# Patient Record
Sex: FEMALE | Race: BLACK OR AFRICAN AMERICAN | Employment: OTHER | URBAN - METROPOLITAN AREA
[De-identification: names, ages, dates, MRNs, and addresses within clinical notes are randomized per-mention and may not be internally consistent; named-entity substitution may affect disease eponyms.]

---

## 2021-12-20 DIAGNOSIS — Z11.59 ENCOUNTER FOR SCREENING FOR OTHER VIRAL DISEASES: ICD-10-CM

## 2021-12-23 ENCOUNTER — TRANSFERRED RECORDS (OUTPATIENT)
Dept: MULTI SPECIALTY CLINIC | Facility: CLINIC | Age: 66
End: 2021-12-23
Payer: MEDICARE

## 2021-12-23 LAB
ALBUMIN (URINE) MG/SPEC: 123.1 UG/ML
ALBUMIN/CREATININE RATIO: 485 MG/G CREAT (ref 0–29)
CREATININE (EXTERNAL): 1.74 MG/DL (ref 0.57–1)
CREATININE (URINE): 25.4 MG/DL
GFR ESTIMATED (EXTERNAL): 30 ML/MIN/1.73
GFR ESTIMATED (IF AFRICAN AMERICAN) (EXTERNAL): 34 ML/MIN/1.73
GLUCOSE (EXTERNAL): 153 MG/DL (ref 65–99)
HBA1C MFR BLD: 6.9 %
POTASSIUM (EXTERNAL): 4.2 MMOL/L (ref 3.5–5.2)

## 2022-01-13 RX ORDER — CHOLECALCIFEROL (VITAMIN D3) 50 MCG
1 TABLET ORAL DAILY
COMMUNITY

## 2022-01-13 RX ORDER — CARVEDILOL 25 MG/1
25 TABLET ORAL 2 TIMES DAILY WITH MEALS
COMMUNITY

## 2022-01-13 RX ORDER — LOSARTAN POTASSIUM 100 MG/1
100 TABLET ORAL DAILY
COMMUNITY

## 2022-01-13 RX ORDER — FUROSEMIDE 40 MG
40 TABLET ORAL DAILY
COMMUNITY

## 2022-01-13 RX ORDER — INSULIN GLARGINE 100 [IU]/ML
10 INJECTION, SOLUTION SUBCUTANEOUS AT BEDTIME
COMMUNITY

## 2022-01-13 RX ORDER — ATORVASTATIN CALCIUM 10 MG/1
10 TABLET, FILM COATED ORAL DAILY
COMMUNITY

## 2022-01-13 RX ORDER — INSULIN ASPART 100 [IU]/ML
1-15 INJECTION, SOLUTION INTRAVENOUS; SUBCUTANEOUS
COMMUNITY

## 2022-01-13 RX ORDER — ASPIRIN 81 MG/1
81 TABLET ORAL DAILY
Status: ON HOLD | COMMUNITY
End: 2022-01-18

## 2022-01-13 RX ORDER — AMLODIPINE BESYLATE 10 MG/1
10 TABLET ORAL DAILY
COMMUNITY

## 2022-01-13 NOTE — PROGRESS NOTES
Total Joint Patient Screening    1. Do you have a ride available to come to the hospital the day after your surgery by 8am with anticipated discharge of 11am? Yes  2. What is the name of this person? Lionel (spouse)  3. Do you have a  set up after surgery? Yes  4. Will your  be the same person that gives you a ride home after surgery? Yes  5. Have you received the Joint Replacement Guidebook? Yes  6. Do you have any questions about your guidebook? No  7. Have you activated you [facility] account? No  8. Have you signed up for MY Chart access? No

## 2022-01-14 NOTE — PROGRESS NOTES
PTA medications updated by Medication Scribe prior to surgery via phone call with patient (last doses completed by Nurse)     Medication history sources: Patient and H&P  In the past week, patient estimated taking medication this percent of the time: Greater than 90%  Adherence assessment: N/A Not Observed    Significant changes made to the medication list:  None      Additional medication history information:   None    Medication reconciliation completed by provider prior to medication history? No    Time spent in this activity: 35 MINUTES    The information provided in this note is only as accurate as the sources available at the time of update(s)      Prior to Admission medications    Medication Sig Last Dose Taking? Auth Provider   amLODIPine (NORVASC) 10 MG tablet Take 10 mg by mouth daily  at AM Yes Reported, Patient   aspirin 81 MG EC tablet Take 81 mg by mouth daily  at AM Yes Reported, Patient   atorvastatin (LIPITOR) 10 MG tablet Take 10 mg by mouth daily  at AM Yes Reported, Patient   carvedilol (COREG) 25 MG tablet Take 25 mg by mouth 2 times daily (with meals)  at PM Yes Reported, Patient   furosemide (LASIX) 40 MG tablet Take 40 mg by mouth daily  at AM Yes Reported, Patient   insulin aspart (NOVOLOG FLEXPEN) 100 UNIT/ML pen Inject 1-15 Units Subcutaneous 3 times daily (with meals) Inject 1-15 units under the skin three times a day with meals.   Take 1 unit per 13 gram carb before breakfast.  Take 1 unit per 20 gram carb before lunch  Take 1 unit per 7 gram carb before dinner  at PM Yes Reported, Patient   insulin glargine (BASAGLAR KWIKPEN) 100 UNIT/ML pen Inject 10 Units Subcutaneous At Bedtime  at PM Yes Reported, Patient   losartan (COZAAR) 100 MG tablet Take 100 mg by mouth daily  at AM Yes Reported, Patient   vitamin D3 (VITAMIN D3) 50 mcg (2000 units) tablet Take 1 tablet by mouth daily   at AM Yes Reported, Patient

## 2022-01-15 ENCOUNTER — LAB (OUTPATIENT)
Dept: URGENT CARE | Facility: URGENT CARE | Age: 67
End: 2022-01-15
Attending: ORTHOPAEDIC SURGERY
Payer: MEDICARE

## 2022-01-15 DIAGNOSIS — Z11.59 ENCOUNTER FOR SCREENING FOR OTHER VIRAL DISEASES: ICD-10-CM

## 2022-01-15 LAB — SARS-COV-2 RNA RESP QL NAA+PROBE: NEGATIVE

## 2022-01-15 PROCEDURE — U0003 INFECTIOUS AGENT DETECTION BY NUCLEIC ACID (DNA OR RNA); SEVERE ACUTE RESPIRATORY SYNDROME CORONAVIRUS 2 (SARS-COV-2) (CORONAVIRUS DISEASE [COVID-19]), AMPLIFIED PROBE TECHNIQUE, MAKING USE OF HIGH THROUGHPUT TECHNOLOGIES AS DESCRIBED BY CMS-2020-01-R: HCPCS

## 2022-01-15 PROCEDURE — U0005 INFEC AGEN DETEC AMPLI PROBE: HCPCS

## 2022-01-16 ENCOUNTER — ANESTHESIA EVENT (OUTPATIENT)
Dept: SURGERY | Facility: CLINIC | Age: 67
End: 2022-01-16
Payer: MEDICARE

## 2022-01-16 ASSESSMENT — LIFESTYLE VARIABLES: TOBACCO_USE: 0

## 2022-01-16 ASSESSMENT — ENCOUNTER SYMPTOMS: DYSRHYTHMIAS: 0

## 2022-01-16 ASSESSMENT — COPD QUESTIONNAIRES: COPD: 0

## 2022-01-17 ENCOUNTER — ANESTHESIA (OUTPATIENT)
Dept: SURGERY | Facility: CLINIC | Age: 67
End: 2022-01-17
Payer: MEDICARE

## 2022-01-17 ENCOUNTER — APPOINTMENT (OUTPATIENT)
Dept: PHYSICAL THERAPY | Facility: CLINIC | Age: 67
End: 2022-01-17
Attending: ORTHOPAEDIC SURGERY
Payer: MEDICARE

## 2022-01-17 ENCOUNTER — HOSPITAL ENCOUNTER (OUTPATIENT)
Facility: CLINIC | Age: 67
Discharge: HOME | End: 2022-01-18
Attending: ORTHOPAEDIC SURGERY | Admitting: ORTHOPAEDIC SURGERY
Payer: MEDICARE

## 2022-01-17 ENCOUNTER — APPOINTMENT (OUTPATIENT)
Dept: GENERAL RADIOLOGY | Facility: CLINIC | Age: 67
End: 2022-01-17
Attending: ORTHOPAEDIC SURGERY
Payer: MEDICARE

## 2022-01-17 DIAGNOSIS — E11.69 TYPE 2 DIABETES MELLITUS WITH OTHER SPECIFIED COMPLICATION, WITH LONG-TERM CURRENT USE OF INSULIN (H): ICD-10-CM

## 2022-01-17 DIAGNOSIS — Z96.651 STATUS POST TOTAL RIGHT KNEE REPLACEMENT: Primary | ICD-10-CM

## 2022-01-17 DIAGNOSIS — Z79.4 TYPE 2 DIABETES MELLITUS WITH OTHER SPECIFIED COMPLICATION, WITH LONG-TERM CURRENT USE OF INSULIN (H): ICD-10-CM

## 2022-01-17 DIAGNOSIS — M17.11 PRIMARY OSTEOARTHRITIS OF RIGHT KNEE: ICD-10-CM

## 2022-01-17 DIAGNOSIS — K59.03 DRUG-INDUCED CONSTIPATION: ICD-10-CM

## 2022-01-17 PROBLEM — N18.30 CRF (CHRONIC RENAL FAILURE), STAGE 3 (MODERATE) (H): Status: ACTIVE | Noted: 2022-01-17

## 2022-01-17 PROBLEM — E11.9 TYPE 2 DIABETES MELLITUS (H): Status: ACTIVE | Noted: 2022-01-17

## 2022-01-17 LAB
ANION GAP SERPL CALCULATED.3IONS-SCNC: 6 MMOL/L (ref 3–14)
BUN SERPL-MCNC: 38 MG/DL (ref 7–30)
CALCIUM SERPL-MCNC: 9 MG/DL (ref 8.5–10.1)
CHLORIDE BLD-SCNC: 108 MMOL/L (ref 94–109)
CO2 SERPL-SCNC: 27 MMOL/L (ref 20–32)
CREAT SERPL-MCNC: 1.56 MG/DL (ref 0.52–1.04)
CREAT SERPL-MCNC: 1.89 MG/DL (ref 0.52–1.04)
FASTING STATUS PATIENT QL REPORTED: YES
GFR SERPL CREATININE-BSD FRML MDRD: 29 ML/MIN/1.73M2
GFR SERPL CREATININE-BSD FRML MDRD: 36 ML/MIN/1.73M2
GLUCOSE BLD-MCNC: 103 MG/DL (ref 70–99)
GLUCOSE BLD-MCNC: 291 MG/DL (ref 70–99)
GLUCOSE BLDC GLUCOMTR-MCNC: 201 MG/DL (ref 70–99)
GLUCOSE BLDC GLUCOMTR-MCNC: 206 MG/DL (ref 70–99)
GLUCOSE BLDC GLUCOMTR-MCNC: 244 MG/DL (ref 70–99)
GLUCOSE BLDC GLUCOMTR-MCNC: 278 MG/DL (ref 70–99)
GLUCOSE BLDC GLUCOMTR-MCNC: 97 MG/DL (ref 70–99)
LACTATE SERPL-SCNC: 1.4 MMOL/L (ref 0.7–2)
POTASSIUM BLD-SCNC: 3.7 MMOL/L (ref 3.4–5.3)
POTASSIUM BLD-SCNC: 4 MMOL/L (ref 3.4–5.3)
SODIUM SERPL-SCNC: 141 MMOL/L (ref 133–144)

## 2022-01-17 PROCEDURE — 97530 THERAPEUTIC ACTIVITIES: CPT | Mod: GP | Performed by: PHYSICAL THERAPIST

## 2022-01-17 PROCEDURE — 96372 THER/PROPH/DIAG INJ SC/IM: CPT | Performed by: PHYSICIAN ASSISTANT

## 2022-01-17 PROCEDURE — 250N000011 HC RX IP 250 OP 636: Performed by: ORTHOPAEDIC SURGERY

## 2022-01-17 PROCEDURE — 258N000003 HC RX IP 258 OP 636: Performed by: ORTHOPAEDIC SURGERY

## 2022-01-17 PROCEDURE — 83605 ASSAY OF LACTIC ACID: CPT | Performed by: ORTHOPAEDIC SURGERY

## 2022-01-17 PROCEDURE — 82947 ASSAY GLUCOSE BLOOD QUANT: CPT | Performed by: ANESTHESIOLOGY

## 2022-01-17 PROCEDURE — 36415 COLL VENOUS BLD VENIPUNCTURE: CPT | Performed by: ORTHOPAEDIC SURGERY

## 2022-01-17 PROCEDURE — 82565 ASSAY OF CREATININE: CPT | Performed by: ORTHOPAEDIC SURGERY

## 2022-01-17 PROCEDURE — 250N000009 HC RX 250: Performed by: ANESTHESIOLOGY

## 2022-01-17 PROCEDURE — 99218 PR INITIAL OBSERVATION CARE,LEVEL I: CPT | Performed by: PHYSICIAN ASSISTANT

## 2022-01-17 PROCEDURE — 84132 ASSAY OF SERUM POTASSIUM: CPT | Performed by: ANESTHESIOLOGY

## 2022-01-17 PROCEDURE — 999N000063 XR KNEE PORT RIGHT 1/2 VIEWS: Mod: RT

## 2022-01-17 PROCEDURE — 97116 GAIT TRAINING THERAPY: CPT | Mod: GP | Performed by: PHYSICAL THERAPIST

## 2022-01-17 PROCEDURE — 272N000001 HC OR GENERAL SUPPLY STERILE: Performed by: ORTHOPAEDIC SURGERY

## 2022-01-17 PROCEDURE — 258N000003 HC RX IP 258 OP 636: Performed by: ANESTHESIOLOGY

## 2022-01-17 PROCEDURE — 36415 COLL VENOUS BLD VENIPUNCTURE: CPT | Performed by: ANESTHESIOLOGY

## 2022-01-17 PROCEDURE — 250N000012 HC RX MED GY IP 250 OP 636 PS 637: Performed by: ANESTHESIOLOGY

## 2022-01-17 PROCEDURE — 250N000011 HC RX IP 250 OP 636: Performed by: ANESTHESIOLOGY

## 2022-01-17 PROCEDURE — 36415 COLL VENOUS BLD VENIPUNCTURE: CPT | Performed by: PHYSICIAN ASSISTANT

## 2022-01-17 PROCEDURE — 370N000017 HC ANESTHESIA TECHNICAL FEE, PER MIN: Performed by: ORTHOPAEDIC SURGERY

## 2022-01-17 PROCEDURE — 278N000051 HC OR IMPLANT GENERAL: Performed by: ORTHOPAEDIC SURGERY

## 2022-01-17 PROCEDURE — 360N000077 HC SURGERY LEVEL 4, PER MIN: Performed by: ORTHOPAEDIC SURGERY

## 2022-01-17 PROCEDURE — 710N000009 HC RECOVERY PHASE 1, LEVEL 1, PER MIN: Performed by: ORTHOPAEDIC SURGERY

## 2022-01-17 PROCEDURE — 250N000013 HC RX MED GY IP 250 OP 250 PS 637: Performed by: PHYSICIAN ASSISTANT

## 2022-01-17 PROCEDURE — 250N000012 HC RX MED GY IP 250 OP 636 PS 637: Performed by: PHYSICIAN ASSISTANT

## 2022-01-17 PROCEDURE — 97161 PT EVAL LOW COMPLEX 20 MIN: CPT | Mod: GP | Performed by: PHYSICAL THERAPIST

## 2022-01-17 PROCEDURE — 999N000141 HC STATISTIC PRE-PROCEDURE NURSING ASSESSMENT: Performed by: ORTHOPAEDIC SURGERY

## 2022-01-17 PROCEDURE — C1776 JOINT DEVICE (IMPLANTABLE): HCPCS | Performed by: ORTHOPAEDIC SURGERY

## 2022-01-17 PROCEDURE — 99221 1ST HOSP IP/OBS SF/LOW 40: CPT | Performed by: INTERNAL MEDICINE

## 2022-01-17 PROCEDURE — 82947 ASSAY GLUCOSE BLOOD QUANT: CPT | Performed by: PHYSICIAN ASSISTANT

## 2022-01-17 PROCEDURE — 271N000001 HC OR GENERAL SUPPLY NON-STERILE: Performed by: ORTHOPAEDIC SURGERY

## 2022-01-17 PROCEDURE — 250N000013 HC RX MED GY IP 250 OP 250 PS 637: Performed by: ORTHOPAEDIC SURGERY

## 2022-01-17 PROCEDURE — 82962 GLUCOSE BLOOD TEST: CPT

## 2022-01-17 DEVICE — BONE CEMENT SIMPLEX FULL DOSE 6191-1-001: Type: IMPLANTABLE DEVICE | Site: KNEE | Status: FUNCTIONAL

## 2022-01-17 DEVICE — IMPLANTABLE DEVICE: Type: IMPLANTABLE DEVICE | Site: KNEE | Status: FUNCTIONAL

## 2022-01-17 DEVICE — IMP PATELLA ZIM KNEE ALL POLLY 32MM 42-5400-000-32: Type: IMPLANTABLE DEVICE | Site: KNEE | Status: FUNCTIONAL

## 2022-01-17 DEVICE — IMP STEM EXT ZIM PERSONA KNEE TPR 14MM + 30MM 42-5570-001-14: Type: IMPLANTABLE DEVICE | Site: KNEE | Status: FUNCTIONAL

## 2022-01-17 RX ORDER — TRANEXAMIC ACID 650 MG/1
1950 TABLET ORAL ONCE
Status: COMPLETED | OUTPATIENT
Start: 2022-01-17 | End: 2022-01-17

## 2022-01-17 RX ORDER — HYDROXYZINE HYDROCHLORIDE 10 MG/1
10 TABLET, FILM COATED ORAL EVERY 6 HOURS PRN
Qty: 30 TABLET | Refills: 0 | Status: SHIPPED | OUTPATIENT
Start: 2022-01-17

## 2022-01-17 RX ORDER — SODIUM CHLORIDE, SODIUM LACTATE, POTASSIUM CHLORIDE, CALCIUM CHLORIDE 600; 310; 30; 20 MG/100ML; MG/100ML; MG/100ML; MG/100ML
INJECTION, SOLUTION INTRAVENOUS CONTINUOUS
Status: DISCONTINUED | OUTPATIENT
Start: 2022-01-17 | End: 2022-01-17 | Stop reason: HOSPADM

## 2022-01-17 RX ORDER — POLYETHYLENE GLYCOL 3350 17 G/17G
17 POWDER, FOR SOLUTION ORAL DAILY
Status: DISCONTINUED | OUTPATIENT
Start: 2022-01-18 | End: 2022-01-18 | Stop reason: HOSPADM

## 2022-01-17 RX ORDER — BUPIVACAINE HYDROCHLORIDE 7.5 MG/ML
INJECTION, SOLUTION INTRASPINAL PRN
Status: DISCONTINUED | OUTPATIENT
Start: 2022-01-17 | End: 2022-01-17

## 2022-01-17 RX ORDER — HYDROMORPHONE HYDROCHLORIDE 2 MG/1
2 TABLET ORAL EVERY 4 HOURS PRN
Status: DISCONTINUED | OUTPATIENT
Start: 2022-01-17 | End: 2022-01-18 | Stop reason: HOSPADM

## 2022-01-17 RX ORDER — AMOXICILLIN 250 MG
1-2 CAPSULE ORAL 2 TIMES DAILY
Qty: 30 TABLET | Refills: 0 | Status: SHIPPED | OUTPATIENT
Start: 2022-01-17

## 2022-01-17 RX ORDER — HYDROMORPHONE HCL IN WATER/PF 6 MG/30 ML
0.4 PATIENT CONTROLLED ANALGESIA SYRINGE INTRAVENOUS
Status: DISCONTINUED | OUTPATIENT
Start: 2022-01-17 | End: 2022-01-18 | Stop reason: HOSPADM

## 2022-01-17 RX ORDER — SODIUM CHLORIDE 9 MG/ML
INJECTION, SOLUTION INTRAVENOUS CONTINUOUS
Status: DISCONTINUED | OUTPATIENT
Start: 2022-01-17 | End: 2022-01-18 | Stop reason: HOSPADM

## 2022-01-17 RX ORDER — ONDANSETRON 4 MG/1
4 TABLET, ORALLY DISINTEGRATING ORAL EVERY 30 MIN PRN
Status: DISCONTINUED | OUTPATIENT
Start: 2022-01-17 | End: 2022-01-17 | Stop reason: HOSPADM

## 2022-01-17 RX ORDER — NICOTINE POLACRILEX 4 MG
15-30 LOZENGE BUCCAL
Status: DISCONTINUED | OUTPATIENT
Start: 2022-01-17 | End: 2022-01-18 | Stop reason: HOSPADM

## 2022-01-17 RX ORDER — ONDANSETRON 2 MG/ML
INJECTION INTRAMUSCULAR; INTRAVENOUS PRN
Status: DISCONTINUED | OUTPATIENT
Start: 2022-01-17 | End: 2022-01-17

## 2022-01-17 RX ORDER — ONDANSETRON 2 MG/ML
4 INJECTION INTRAMUSCULAR; INTRAVENOUS EVERY 30 MIN PRN
Status: DISCONTINUED | OUTPATIENT
Start: 2022-01-17 | End: 2022-01-17 | Stop reason: HOSPADM

## 2022-01-17 RX ORDER — DEXTROSE MONOHYDRATE 25 G/50ML
25-50 INJECTION, SOLUTION INTRAVENOUS
Status: DISCONTINUED | OUTPATIENT
Start: 2022-01-17 | End: 2022-01-18 | Stop reason: HOSPADM

## 2022-01-17 RX ORDER — AMLODIPINE BESYLATE 10 MG/1
10 TABLET ORAL DAILY
Status: DISCONTINUED | OUTPATIENT
Start: 2022-01-18 | End: 2022-01-18 | Stop reason: HOSPADM

## 2022-01-17 RX ORDER — PROPOFOL 10 MG/ML
INJECTION, EMULSION INTRAVENOUS CONTINUOUS PRN
Status: DISCONTINUED | OUTPATIENT
Start: 2022-01-17 | End: 2022-01-17

## 2022-01-17 RX ORDER — CEFAZOLIN SODIUM 2 G/100ML
2 INJECTION, SOLUTION INTRAVENOUS SEE ADMIN INSTRUCTIONS
Status: DISCONTINUED | OUTPATIENT
Start: 2022-01-17 | End: 2022-01-17 | Stop reason: HOSPADM

## 2022-01-17 RX ORDER — ACETAMINOPHEN 325 MG/1
650 TABLET ORAL EVERY 4 HOURS PRN
Status: DISCONTINUED | OUTPATIENT
Start: 2022-01-20 | End: 2022-01-18 | Stop reason: HOSPADM

## 2022-01-17 RX ORDER — CARVEDILOL 12.5 MG/1
25 TABLET ORAL 2 TIMES DAILY WITH MEALS
Status: DISCONTINUED | OUTPATIENT
Start: 2022-01-17 | End: 2022-01-18 | Stop reason: HOSPADM

## 2022-01-17 RX ORDER — BISACODYL 10 MG
10 SUPPOSITORY, RECTAL RECTAL DAILY PRN
Status: DISCONTINUED | OUTPATIENT
Start: 2022-01-17 | End: 2022-01-18 | Stop reason: HOSPADM

## 2022-01-17 RX ORDER — NALOXONE HYDROCHLORIDE 0.4 MG/ML
0.4 INJECTION, SOLUTION INTRAMUSCULAR; INTRAVENOUS; SUBCUTANEOUS
Status: DISCONTINUED | OUTPATIENT
Start: 2022-01-17 | End: 2022-01-18 | Stop reason: HOSPADM

## 2022-01-17 RX ORDER — LOSARTAN POTASSIUM 100 MG/1
100 TABLET ORAL DAILY
Status: DISCONTINUED | OUTPATIENT
Start: 2022-01-18 | End: 2022-01-18 | Stop reason: HOSPADM

## 2022-01-17 RX ORDER — HYDROMORPHONE HCL IN WATER/PF 6 MG/30 ML
0.2 PATIENT CONTROLLED ANALGESIA SYRINGE INTRAVENOUS
Status: DISCONTINUED | OUTPATIENT
Start: 2022-01-17 | End: 2022-01-18 | Stop reason: HOSPADM

## 2022-01-17 RX ORDER — METHOCARBAMOL 500 MG/1
500 TABLET, FILM COATED ORAL EVERY 6 HOURS PRN
Status: DISCONTINUED | OUTPATIENT
Start: 2022-01-17 | End: 2022-01-18 | Stop reason: HOSPADM

## 2022-01-17 RX ORDER — DIPHENHYDRAMINE HCL 12.5MG/5ML
12.5 LIQUID (ML) ORAL EVERY 6 HOURS PRN
Status: DISCONTINUED | OUTPATIENT
Start: 2022-01-17 | End: 2022-01-18 | Stop reason: HOSPADM

## 2022-01-17 RX ORDER — ONDANSETRON 4 MG/1
4 TABLET, ORALLY DISINTEGRATING ORAL EVERY 6 HOURS PRN
Status: DISCONTINUED | OUTPATIENT
Start: 2022-01-17 | End: 2022-01-18 | Stop reason: HOSPADM

## 2022-01-17 RX ORDER — ATORVASTATIN CALCIUM 10 MG/1
10 TABLET, FILM COATED ORAL EVERY EVENING
Status: DISCONTINUED | OUTPATIENT
Start: 2022-01-17 | End: 2022-01-18 | Stop reason: HOSPADM

## 2022-01-17 RX ORDER — FENTANYL CITRATE 0.05 MG/ML
50 INJECTION, SOLUTION INTRAMUSCULAR; INTRAVENOUS
Status: DISCONTINUED | OUTPATIENT
Start: 2022-01-17 | End: 2022-01-17 | Stop reason: HOSPADM

## 2022-01-17 RX ORDER — NALOXONE HYDROCHLORIDE 0.4 MG/ML
0.2 INJECTION, SOLUTION INTRAMUSCULAR; INTRAVENOUS; SUBCUTANEOUS
Status: DISCONTINUED | OUTPATIENT
Start: 2022-01-17 | End: 2022-01-18 | Stop reason: HOSPADM

## 2022-01-17 RX ORDER — ONDANSETRON 2 MG/ML
4 INJECTION INTRAMUSCULAR; INTRAVENOUS EVERY 6 HOURS PRN
Status: DISCONTINUED | OUTPATIENT
Start: 2022-01-17 | End: 2022-01-18 | Stop reason: HOSPADM

## 2022-01-17 RX ORDER — ACETAMINOPHEN 325 MG/1
975 TABLET ORAL EVERY 8 HOURS
Status: DISCONTINUED | OUTPATIENT
Start: 2022-01-17 | End: 2022-01-18 | Stop reason: HOSPADM

## 2022-01-17 RX ORDER — FENTANYL CITRATE-0.9 % NACL/PF 10 MCG/ML
PLASTIC BAG, INJECTION (ML) INTRAVENOUS CONTINUOUS PRN
Status: DISCONTINUED | OUTPATIENT
Start: 2022-01-17 | End: 2022-01-17

## 2022-01-17 RX ORDER — AMOXICILLIN 250 MG
1 CAPSULE ORAL 2 TIMES DAILY
Status: DISCONTINUED | OUTPATIENT
Start: 2022-01-17 | End: 2022-01-18 | Stop reason: HOSPADM

## 2022-01-17 RX ORDER — CEFAZOLIN SODIUM 2 G/100ML
2 INJECTION, SOLUTION INTRAVENOUS EVERY 8 HOURS
Status: COMPLETED | OUTPATIENT
Start: 2022-01-17 | End: 2022-01-17

## 2022-01-17 RX ORDER — HYDROMORPHONE HCL IN WATER/PF 6 MG/30 ML
0.2 PATIENT CONTROLLED ANALGESIA SYRINGE INTRAVENOUS EVERY 5 MIN PRN
Status: DISCONTINUED | OUTPATIENT
Start: 2022-01-17 | End: 2022-01-17 | Stop reason: HOSPADM

## 2022-01-17 RX ORDER — HYDROMORPHONE HYDROCHLORIDE 2 MG/1
4 TABLET ORAL EVERY 4 HOURS PRN
Status: DISCONTINUED | OUTPATIENT
Start: 2022-01-17 | End: 2022-01-18 | Stop reason: HOSPADM

## 2022-01-17 RX ORDER — HYDROMORPHONE HYDROCHLORIDE 2 MG/1
2 TABLET ORAL EVERY 4 HOURS PRN
Qty: 50 TABLET | Refills: 0 | Status: SHIPPED | OUTPATIENT
Start: 2022-01-17

## 2022-01-17 RX ORDER — HYDROXYZINE HYDROCHLORIDE 10 MG/1
10 TABLET, FILM COATED ORAL EVERY 6 HOURS PRN
Status: DISCONTINUED | OUTPATIENT
Start: 2022-01-17 | End: 2022-01-18 | Stop reason: HOSPADM

## 2022-01-17 RX ORDER — CEFAZOLIN SODIUM 2 G/100ML
2 INJECTION, SOLUTION INTRAVENOUS
Status: COMPLETED | OUTPATIENT
Start: 2022-01-17 | End: 2022-01-17

## 2022-01-17 RX ORDER — FENTANYL CITRATE 0.05 MG/ML
25 INJECTION, SOLUTION INTRAMUSCULAR; INTRAVENOUS EVERY 5 MIN PRN
Status: DISCONTINUED | OUTPATIENT
Start: 2022-01-17 | End: 2022-01-17 | Stop reason: HOSPADM

## 2022-01-17 RX ORDER — LIDOCAINE 40 MG/G
CREAM TOPICAL
Status: DISCONTINUED | OUTPATIENT
Start: 2022-01-17 | End: 2022-01-18 | Stop reason: HOSPADM

## 2022-01-17 RX ADMIN — INSULIN HUMAN 4 UNITS: 100 INJECTION, SOLUTION PARENTERAL at 08:54

## 2022-01-17 RX ADMIN — HYDROMORPHONE HYDROCHLORIDE 2 MG: 2 TABLET ORAL at 15:14

## 2022-01-17 RX ADMIN — PHENYLEPHRINE HYDROCHLORIDE 50 MCG: 10 INJECTION INTRAVENOUS at 09:30

## 2022-01-17 RX ADMIN — INSULIN ASPART 3 UNITS: 100 INJECTION, SOLUTION INTRAVENOUS; SUBCUTANEOUS at 18:41

## 2022-01-17 RX ADMIN — CEFAZOLIN 2 G: 10 INJECTION, POWDER, FOR SOLUTION INTRAVENOUS at 23:16

## 2022-01-17 RX ADMIN — PHENYLEPHRINE HYDROCHLORIDE 50 MCG: 10 INJECTION INTRAVENOUS at 08:25

## 2022-01-17 RX ADMIN — PHENYLEPHRINE HYDROCHLORIDE 50 MCG: 10 INJECTION INTRAVENOUS at 08:50

## 2022-01-17 RX ADMIN — TRANEXAMIC ACID 1950 MG: 650 TABLET ORAL at 06:32

## 2022-01-17 RX ADMIN — PHENYLEPHRINE HYDROCHLORIDE 50 MCG: 10 INJECTION INTRAVENOUS at 08:35

## 2022-01-17 RX ADMIN — MIDAZOLAM 2 MG: 1 INJECTION INTRAMUSCULAR; INTRAVENOUS at 07:19

## 2022-01-17 RX ADMIN — BUPIVACAINE HYDROCHLORIDE 15 ML: 5 INJECTION, SOLUTION EPIDURAL; INTRACAUDAL at 07:22

## 2022-01-17 RX ADMIN — PHENYLEPHRINE HYDROCHLORIDE 50 MCG: 10 INJECTION INTRAVENOUS at 08:20

## 2022-01-17 RX ADMIN — FENTANYL CITRATE 25 MCG: 50 INJECTION, SOLUTION INTRAMUSCULAR; INTRAVENOUS at 10:28

## 2022-01-17 RX ADMIN — ATORVASTATIN CALCIUM 10 MG: 10 TABLET, FILM COATED ORAL at 21:38

## 2022-01-17 RX ADMIN — PHENYLEPHRINE HYDROCHLORIDE 50 MCG: 10 INJECTION INTRAVENOUS at 08:15

## 2022-01-17 RX ADMIN — PHENYLEPHRINE HYDROCHLORIDE 50 MCG: 10 INJECTION INTRAVENOUS at 08:55

## 2022-01-17 RX ADMIN — PHENYLEPHRINE HYDROCHLORIDE 50 MCG: 10 INJECTION INTRAVENOUS at 08:40

## 2022-01-17 RX ADMIN — PHENYLEPHRINE HYDROCHLORIDE 50 MCG: 10 INJECTION INTRAVENOUS at 08:45

## 2022-01-17 RX ADMIN — PHENYLEPHRINE HYDROCHLORIDE 50 MCG: 10 INJECTION INTRAVENOUS at 09:10

## 2022-01-17 RX ADMIN — PHENYLEPHRINE HYDROCHLORIDE 50 MCG: 10 INJECTION INTRAVENOUS at 09:20

## 2022-01-17 RX ADMIN — SODIUM CHLORIDE: 9 INJECTION, SOLUTION INTRAVENOUS at 12:40

## 2022-01-17 RX ADMIN — ASPIRIN 325 MG: 325 TABLET, COATED ORAL at 13:13

## 2022-01-17 RX ADMIN — INSULIN GLARGINE 7 UNITS: 100 INJECTION, SOLUTION SUBCUTANEOUS at 23:17

## 2022-01-17 RX ADMIN — PHENYLEPHRINE HYDROCHLORIDE 50 MCG: 10 INJECTION INTRAVENOUS at 08:30

## 2022-01-17 RX ADMIN — SENNOSIDES AND DOCUSATE SODIUM 1 TABLET: 50; 8.6 TABLET ORAL at 21:38

## 2022-01-17 RX ADMIN — CEFAZOLIN 2 G: 10 INJECTION, POWDER, FOR SOLUTION INTRAVENOUS at 15:14

## 2022-01-17 RX ADMIN — FENTANYL CITRATE 25 MCG: 50 INJECTION, SOLUTION INTRAMUSCULAR; INTRAVENOUS at 10:33

## 2022-01-17 RX ADMIN — PHENYLEPHRINE HYDROCHLORIDE 50 MCG: 10 INJECTION INTRAVENOUS at 09:15

## 2022-01-17 RX ADMIN — HYDROXYZINE HYDROCHLORIDE 10 MG: 10 TABLET ORAL at 13:13

## 2022-01-17 RX ADMIN — CARVEDILOL 25 MG: 12.5 TABLET, FILM COATED ORAL at 17:38

## 2022-01-17 RX ADMIN — PHENYLEPHRINE HYDROCHLORIDE 50 MCG: 10 INJECTION INTRAVENOUS at 09:00

## 2022-01-17 RX ADMIN — CEFAZOLIN SODIUM 2 G: 2 INJECTION, SOLUTION INTRAVENOUS at 07:34

## 2022-01-17 RX ADMIN — PHENYLEPHRINE HYDROCHLORIDE 50 MCG: 10 INJECTION INTRAVENOUS at 09:05

## 2022-01-17 RX ADMIN — PHENYLEPHRINE HYDROCHLORIDE 50 MCG: 10 INJECTION INTRAVENOUS at 09:25

## 2022-01-17 RX ADMIN — ONDANSETRON 4 MG: 2 INJECTION INTRAMUSCULAR; INTRAVENOUS at 07:57

## 2022-01-17 RX ADMIN — BUPIVACAINE HYDROCHLORIDE IN DEXTROSE 12.5 MG: 7.5 INJECTION, SOLUTION SUBARACHNOID at 07:48

## 2022-01-17 RX ADMIN — ACETAMINOPHEN 975 MG: 325 TABLET, FILM COATED ORAL at 13:14

## 2022-01-17 RX ADMIN — ACETAMINOPHEN 975 MG: 325 TABLET, FILM COATED ORAL at 21:38

## 2022-01-17 RX ADMIN — SODIUM CHLORIDE 5 UNITS: 9 INJECTION, SOLUTION INTRAVENOUS at 10:20

## 2022-01-17 RX ADMIN — PROPOFOL 50 MCG/KG/MIN: 10 INJECTION, EMULSION INTRAVENOUS at 07:50

## 2022-01-17 RX ADMIN — SODIUM CHLORIDE, POTASSIUM CHLORIDE, SODIUM LACTATE AND CALCIUM CHLORIDE: 600; 310; 30; 20 INJECTION, SOLUTION INTRAVENOUS at 06:32

## 2022-01-17 ASSESSMENT — MIFFLIN-ST. JEOR: SCORE: 1350.08

## 2022-01-17 NOTE — OP NOTE
Procedure Date: 01/17/2022    PREOPERATIVE DIAGNOSIS:  Advanced degenerative arthritis of the right knee.    POSTOPERATIVE DIAGNOSIS:  Advanced degenerative arthritis of the right knee.    PROCEDURE:  Right total knee arthroplasty.    SURGEON:  Colt Ochoa MD    FIRST ASSISTANT:  Nieves Guerrero PA-C    SECOND ASSISTANT:  Claudia Holley ATC.    DESCRIPTION OF PROCEDURE:  The patient was brought to the operating room, given a right adductor canal block.  She was then given a spinal anesthetic, and her right knee and right lower extremity were then prepped and draped in sterile fashion.  Right lower extremity was exsanguinated and the tourniquet was inflated.  An incision was made over the anterior aspect of the knee.  This was carried down to subcutaneous tissues down to the fascia, and a standard median parapatellar approach was taken to the knee joint.  This revealed advanced degenerative arthritis, particularly within the lateral compartment with complete loss of articular cartilage and some eburnation and wear of the bone within the lateral compartment.  The fat pad was excised, the menisci were excised, and the ACL was excised.  A drill hole was then made in the distal femur, and using the intramedullary guide set to 5 degrees from the anatomic axis.  The femur was cut to accept a size 5 cruciate substituting Persona femoral component.  We did freehand the rotation a bit because of the wear over the posterior lateral femoral condyle, which would have left us with an internally rotated femoral component.  We used Whitesides line and the epicondylar axis to make sure we had the rotation correct.  The tibia was cut using the external alignment guide to accept a size D Persona tibial component.  We did place a short stem extension on the tibial component because of her poor bone quality to give it some more support.  The patella was cut to accept a size 32 patella.  The knee was then trialed.  The alignment  appeared satisfactory.  Ligaments were stable and balanced.  Trial components were removed.  We injected the posterior capsule with the periarticular analgesic cocktail.  The knee was then thoroughly irrigated and dried, and then the real components were cemented into place.  While cement was hardening, we irrigated the knee with a dilute solution of Betadine, was allowed to sit within the knee for 3 minutes and was thoroughly irrigated from the knee with a liter of normal saline.  Once the cement had hardened, all extraneous cement was removed.  We trialed the knee once again, it appeared that a 12 mm insert gave the best fit.  A real 12 mm tibial polyethylene insert was then snapped into the tibial component.  The knee had full range of motion and the patella tracked well.  The wound was then irrigated once again with saline.  The fascia was closed with interrupted 0 Vicryl sutures.  The skin was closed with 2-0 Vicryl subcutaneous sutures and staples.  A sterile compressive dressing was applied to the knee.  The tourniquet was deflated.  The patient was then awakened from anesthesia and transferred to postanesthesia recovery in satisfactory condition.    It should be noted that my assistants were necessary throughout the entire procedure to assist with retraction and positioning.    Colt Ochoa MD        D: 2022   T: 2022   MT: AYLEEN    Name:     JUANITA WORKMAN  MRN:      [id number]        Account:        [id number]   :      1955           Procedure Date: 2022     Document: [id number]

## 2022-01-17 NOTE — PROGRESS NOTES
Patient vital signs are at baseline: Yes  Patient able to ambulate as they were prior to admission or with assist devices provided by therapies during their stay:  No,  Reason:  PT this pm, up w/ assist of 1, walker and gait belt to bedside commode  Patient MUST void prior to discharge:  Yes  Patient able to tolerate oral intake:  Yes  Pain has adequate pain control using Oral analgesics: Pre-medicated w/ po Dilaudid prior to PT, scheduled Tylenol and Atarax also given

## 2022-01-17 NOTE — PROGRESS NOTES
Arrived from Recovery room.  Oriented but somnolent, able to make needs known.   Oriented to room/unit.  Moderate to severe surgical pain, ice pack applied.

## 2022-01-17 NOTE — ANESTHESIA PREPROCEDURE EVALUATION
Anesthesia Pre-Procedure Evaluation    Patient: Judi Moore   MRN: [id number] : 1955        Preoperative Diagnosis: Osteoarthritis of right knee, unspecified osteoarthritis type [M17.11]    Procedure : Procedure(s):  RIGHT TOTAL KNEE ARTHROPLASTY          Past Medical History:   Diagnosis Date     Anemia      Cataracts, bilateral      Chronic bilateral back pain     without sciatica     Chronic pain     right knee     CKD (chronic kidney disease)     stage 3     Dermatitis      Diabetic macular edema (H)      Diabetic neuropathy (H)      DM (diabetes mellitus) (H)      Essential hypertension      Gastroenteritis      Hypercholesterolemia      Hyperparathyroidism (H)      Hypertension      Knee pain, right      Medial meniscus tear     right knee     Nuclear sclerosis of both eyes      Obesity (BMI 30.0-34.9)      VANESSA (obstructive sleep apnea)     moderate     Peripheral edema      Pneumonia      Posterior vitreous detachment     both eyes     PPD positive      Retinopathy      Right carpal tunnel syndrome      Thrombocytopenia (H)       Past Surgical History:   Procedure Laterality Date     ABDOMEN SURGERY      appy      Allergies   Allergen Reactions     Oxycodone-Acetaminophen      lightheadedness     Tramadol      lightheadedness     Phenothiazines Rash     Pt unsure of this      Social History     Tobacco Use     Smoking status: Never Smoker     Smokeless tobacco: Never Used   Substance Use Topics     Alcohol use: Never      Wt Readings from Last 1 Encounters:   No data found for Wt        Anesthesia Evaluation   Pt has had prior anesthetic.     No history of anesthetic complications       ROS/MED HX  ENT/Pulmonary:     (+) sleep apnea, moderate, doesn't use CPAP,  (-) tobacco use, asthma and COPD   Neurologic:  - neg neurologic ROS     Cardiovascular:     (+) hypertension----- (-) CAD, arrhythmias and murmur   METS/Exercise Tolerance:     Hematologic: Comments: thrombocytopenia       Musculoskeletal:       GI/Hepatic:    (-) GERD and liver disease   Renal/Genitourinary:     (+) renal disease, type: CRI,     Endo:     (+) type II DM, Using insulin, - not using insulin pump. thyroid problem, hypothyroidism,     Psychiatric/Substance Use:       Infectious Disease:       Malignancy:       Other:            Physical Exam    Airway        Mallampati: II   TM distance: > 3 FB   Neck ROM: full   Mouth opening: > 3 cm    Respiratory Devices and Support         Dental         B=Bridge, C=Chipped, L=Loose, M=Missing    Cardiovascular          Rhythm and rate: regular and normal (-) no murmur    Pulmonary           breath sounds clear to auscultation           OUTSIDE LABS:  CBC: No results found for: WBC, HGB, HCT, PLT  BMP: No results found for: NA, POTASSIUM, CHLORIDE, CO2, BUN, CR, GLC  COAGS: No results found for: PTT, INR, FIBR  POC: No results found for: BGM, HCG, HCGS  HEPATIC: No results found for: ALBUMIN, PROTTOTAL, ALT, AST, GGT, ALKPHOS, BILITOTAL, BILIDIRECT, EMMA  OTHER: No results found for: PH, LACT, A1C, JURGEN, PHOS, MAG, LIPASE, AMYLASE, TSH, T4, T3, CRP, SED    Anesthesia Plan    ASA Status:  3   NPO Status:  NPO Appropriate    Anesthesia Type: Spinal.              Consents    Anesthesia Plan(s) and associated risks, benefits, and realistic alternatives discussed. Questions answered and patient/representative(s) expressed understanding.    - Discussed:     - Discussed with:  Patient      - Extended Intubation/Ventilatory Support Discussed: No.      - Patient is DNR/DNI Status: No    Use of blood products discussed: No .     Postoperative Care    Pain management: IV analgesics, Peripheral nerve block (Single Shot), Multi-modal analgesia.     - Plan for long acting post-op opioid use   PONV prophylaxis: Ondansetron (or other 5HT-3)     Comments:                Tae Saucedo MD

## 2022-01-17 NOTE — BRIEF OP NOTE
[facility]    Brief Operative Note    Pre-operative diagnosis: Osteoarthritis of right knee, unspecified osteoarthritis type [M17.11]  Post-operative diagnosis Same as pre-operative diagnosis    Procedure: Procedure(s):  RIGHT TOTAL KNEE ARTHROPLASTY  Surgeon: Surgeon(s) and Role:     * Colt Ochoa MD - Primary     * iNeves Barr PA-C - Assisting  Anesthesia: Spinal with Adductor Block   Estimated Blood Loss: Less than 50 ml    Drains: None  Specimens: * No specimens in log *  Findings:   Advanced DJD right knee.  Complications: None.  Implants:   Implant Name Type Inv. Item Serial No.  Lot No. LRB No. Used Action   BONE CEMENT SIMPLEX FULL DOSE 6191-1-001 - [id number] Cement, Bone BONE CEMENT SIMPLEX FULL DOSE 6191-1-001  [employer] [id number] Right 2 Implanted   IMP STEM EXT ZIM PERSONA KNEE TPR 14MM + 30MM -053-14 - [id number] Total Joint Component/Insert IMP STEM EXT ZIM PERSONA KNEE TPR 14MM + 30MM -682-14  [employer] [id number] Right 1 Implanted   IMP PATELLA ZIM KNEE ALL KAYLYNN 32MM -895-32 - [id number] Total Joint Component/Insert IMP PATELLA ZIM KNEE ALL KAYLYNN 32MM -047-32  [employer] [id number] Right 1 Implanted   IMP PERSONA FEMUR CEMENTED STD PS RIGHT COLLEEN 5    [employer] [id number] Right 1 Implanted   IMP PERSONA NATURAL TIBIA CEMENTED 5DEG RIGHT COLLEEN D     [employer] [id number] Right 1 Implanted   IMP PERSONA VIVACIT-E POLYETHYLENE ARTICULAR SURFACE RIGHT PS 12MM     [id number] Right 1 Implanted

## 2022-01-17 NOTE — PLAN OF CARE
Pt is POD x0 R TKA .Dressing C/D/I.A&O x 4 Tolerating diet.Walk with PT.Assist x1 to 2 GB & walker.IVF running 125 ml/hr.Voiding good.

## 2022-01-17 NOTE — CONSULTS
[facility]  Consult Note - Hospitalist Service     Date of Admission:  1/17/2022  Consult Requested by: Dr. Ochoa of Orthopedic Surgery  Reason for Consult: Postoperative medical comanagement of comorbidities including diabetes, hypertension, sleep apnea, etc.  PRIMARY CARE PROVIDER:    No Ref-Primary, Physician    Assessment & Plan   Judi Moore is a 66 year old female admitted on 1/17/2022 with a past medical history significant for hypertension, hyperlipidemia, type 2 diabetes, chronic kidney disease who underwent a planned right total knee arthroplasty on 1/17/2022.  The hospital service was consulted for routine medical management postoperatively.    Osteoarthritis, status post right total knee arthroplasty, 1/17/2022  Hospital service consulted on postop day #0.  Patient doing well postoperatively.  Vital signs are stable.  No immediate complications reported.  EBL less than 50 mL.  -- Defer routine postoperative management including pain medications and DVT prophylaxis to orthopedic surgery.  -- PT/OT  -- Pulmonary hygiene, incentive spirometry.    Type 2 diabetes  Hemoglobin A1c 6.9% as of 12/23/2021.  Prior to admission, patient takes 1-50 units NovoLog 3 times daily per sliding scale, along with carb counting (see PTA med list).  Also takes Basaglar 10 units at bedtime.  Did not take any insulin evening prior to surgery.  Patient did receive 5 units regular insulin perioperatively for elevated blood glucose of 291, now down to 201.  -- Patient has not eaten a meal since prior to surgery.  -- Start high-dose sliding scale NovoLog   -- Consider resuming her carb counting when taking better p.o.  -- Resume PTA Basaglar, decreased to 7 units tonight, increase if tolerating adequate p.o.  -- Advance diet to carbohydrate controlled as tolerated    Hypertension  Hyperlipidemia  -- Resume PTA Coreg 25 mg twice daily  -- Resume amlodipine 10 mg daily  -- Resume Cozaar 100 mg daily  with hold parameter  -- Hold PTA Lasix for now  -- Resume PTA Lipitor    Chronic kidney disease, stage III  Preoperative creatinine was 1.7.  Baseline appears to be around 1.5.  -- Will repeat BMP now, and creatinine tomorrow a.m.  -- Avoid nephrotoxins including NSAIDs as able  -- IV fluids until tolerating adequate p.o  -- Hold PTA Lasix, resume pending stable creatinine    Obstructive sleep apnea  -- Resume CPAP while sleeping with home settings  -- Recommend continuous pulse oximetry/capnography during this hospitalization.    Diet: Advance Diet as Tolerated: Regular Diet Adult     DVT Prophylaxis: Full dose aspirin daily per Ortho  Daniel Catheter: Not present  Code Status: Full Code     Disposition Plan    Discharge date Per primary orthopedic surgery service.     Entered: NYASIA Jaffe 01/17/2022, 1:08 PM        The patient's care was discussed with the attending physician, Dr. Batres.  He is in agreement with my assessment and plan of care.    The hospitalist service would like to thank Dr. Ochoa for the consult.  We will continue to follow.      NYASIA Jaffe  [facility]    Patient seen and examined.  Agree with impression and plan.     João Batres MD  Pager: [phone]  Cell Phone:  [phone]      ______________________________________________________________________      History is obtained from the patient and EMR.      History of Present Illness   Judi Moore is a 66 year old female admitted on 1/17/2022 with a past medical history significant for hypertension, hyperlipidemia, type 2 diabetes, chronic kidney disease who underwent a planned right total knee arthroplasty on 1/17/2022.  Procedure was performed by Dr. Ochoa under spinal anesthesia with abductor block.  EBL less than 50 mL.  No immediate complications reported.    Patient is doing well postoperatively.  Denies any fever, chills, headache, lightheadedness, chest  pain, shortness of breath, abdominal pain, nausea, vomiting, diarrhea, dysuria, or focal weakness.  She is eager to get ambulating with PT.  She states that she did not take her glargine insulin last night.  She normally takes 10 units at bedtime, along with NovoLog sliding scale 3 times daily.  She has not yet eaten today.  Blood glucose elevated to 291, was given 5 units of insulin perioperatively and glucose now down to 201.  She states that her blood sugars have been low at home, she does have hypoglycemic symptoms when this occurs.  Hemoglobin A1c recently was 6.9.  She voices no other concerns currently.    Review of Systems   The 10 point Review of Systems is negative other than noted in the HPI.    Past Medical History    I have reviewed this patient's medical history and updated it with pertinent information if needed.   Past Medical History:   Diagnosis Date     Anemia      Cataracts, bilateral      Chronic bilateral back pain     without sciatica     Chronic pain     right knee     CKD (chronic kidney disease)     stage 3     Dermatitis      Diabetic macular edema (H)      Diabetic neuropathy (H)      DM (diabetes mellitus) (H)      Essential hypertension      Gastroenteritis      Hypercholesterolemia      Hyperparathyroidism (H)      Hypertension      Knee pain, right      Medial meniscus tear     right knee     Nuclear sclerosis of both eyes      Obesity (BMI 30.0-34.9)      VANESSA (obstructive sleep apnea)     moderate     Peripheral edema      Pneumonia      Posterior vitreous detachment     both eyes     PPD positive      Retinopathy      Right carpal tunnel syndrome      Thrombocytopenia (H)        Past Surgical History   I have reviewed this patient's surgical history and updated it with pertinent information if needed.  Past Surgical History:   Procedure Laterality Date     ABDOMEN SURGERY      appy       Social History   I have reviewed this patient's social history and updated it with pertinent  information if needed.   Social History     Tobacco Use     Smoking status: Never Smoker     Smokeless tobacco: Never Used   Vaping Use     Vaping Use: None   Substance Use Topics     Alcohol use: Never     Drug use: Never       Family History   I have reviewed this patient's family history and updated it with pertinent information if needed.   Medical History Relation Name Comments   Good Health Brother 2       Asthma Father        Good Health Sister 5       Good Health Sister 6       Good Health Sister 7       Good Health Sister 8         Relation Name Status Comments   Brother 1   Alive     Brother 2         Father     (Age 80)     Mother     (Age 91) Old age   Sister 1   Alive     Sister 2   Alive     Sister 3   Alive     Sister 4   Alive     Sister 5         Sister 6         Sister 7         Sister 8             Medications   Prior to Admission Medications   Prescriptions Last Dose Informant Patient Reported? Taking?   amLODIPine (NORVASC) 10 MG tablet 2022 at AM Self Yes Yes   Sig: Take 10 mg by mouth daily   aspirin 81 MG EC tablet 2022 at AM Self Yes Yes   Sig: Take 81 mg by mouth daily   atorvastatin (LIPITOR) 10 MG tablet 2022 at AM Self Yes Yes   Sig: Take 10 mg by mouth daily   carvedilol (COREG) 25 MG tablet 2022 at PM Self Yes Yes   Sig: Take 25 mg by mouth 2 times daily (with meals)   furosemide (LASIX) 40 MG tablet 2022 at AM Self Yes Yes   Sig: Take 40 mg by mouth daily   insulin aspart (NOVOLOG FLEXPEN) 100 UNIT/ML pen 2022 at PM Self Yes Yes   Sig: Inject 1-15 Units Subcutaneous 3 times daily (with meals) Inject 1-15 units under the skin three times a day with meals.   Take 1 unit per 13 gram carb before breakfast.  Take 1 unit per 20 gram carb before lunch  Take 1 unit per 7 gram carb before dinner   insulin glargine (BASAGLAR KWIKPEN) 100 UNIT/ML pen 2022 at PM Self Yes Yes   Sig: Inject 10 Units Subcutaneous At Bedtime   losartan (COZAAR) 100  MG tablet 1/16/2022 at AM Self Yes Yes   Sig: Take 100 mg by mouth daily   vitamin D3 (VITAMIN D3) 50 mcg (2000 units) tablet 1/16/2022 at AM Self Yes Yes   Sig: Take 1 tablet by mouth daily       Facility-Administered Medications: None     Allergies   Allergies   Allergen Reactions     Oxycodone-Acetaminophen      lightheadedness     Tramadol      lightheadedness     Phenothiazines Rash     Pt unsure of this       Physical Exam   Vital Signs: Temp: 97.4  F (36.3  C) Temp src: Oral BP: (!) 156/77 Pulse: 73   Resp: 9 SpO2: 93 % O2 Device: Nasal cannula Oxygen Delivery: 2 LPM  Weight: 190 lbs 0 oz    Constitutional: Awake, alert, cooperative, no apparent distress.   ENT: Normocephalic, without obvious abnormality, atraumatic, oropharynx with MMM.  Extraocular movements grossly intact.  Pupils equal and round.  Sclera nonicteric.  Neck: Supple.  Pulmonary: No increased work of breathing, good air exchange, clear to auscultation bilaterally, no crackles or wheezing.  Cardiovascular: Regular rate and rhythm, normal S1 and S2, and no murmur noted.  GI: Normal bowel sounds, soft, non-distended, non-tender.    Skin/Integumen: Warm, dry, no rashes.  Neuro: CN II-XII grossly intact.  Upper and lower extremities strength, coordination and sensation intact bilaterally.    Psych:  Alert and oriented x 3. Normal affect.  Extremities: No lower extremity edema noted, right leg Ace wrap.  Distal pulses intact in all 4 extremities.     Data   Results for orders placed or performed during the hospital encounter of 01/17/22 (from the past 24 hour(s))   Potassium   Result Value Ref Range    Potassium 4.0 3.4 - 5.3 mmol/L   Glucose   Result Value Ref Range    Glucose 291 (H) 70 - 99 mg/dL    Patient Fasting > 8hrs? Yes    Creatinine   Result Value Ref Range    Creatinine 1.89 (H) 0.52 - 1.04 mg/dL    GFR Estimate 29 (L) >60 mL/min/1.73m2   Glucose by meter   Result Value Ref Range    GLUCOSE BY METER POCT 244 (H) 70 - 99 mg/dL   XR Knee  Port Right 1/2 Views    Narrative    EXAM: RIGHT KNEE PORTABLE ONE TO TWO VIEWS  DATE/TIME: 1/17/2022 10:37 AM     INDICATION: Right knee postoperative follow-up.   COMPARISON: None.      Impression    IMPRESSION:  1.  Changes of total knee arthroplasty with patellar resurfacing.  2.  The components are well seated without evidence of complication.  3.  No fracture or joint malalignment.  4.  Postoperative intra-articular and soft tissue gas.  5.  Anterior skin damian.       DYLAN LEE MD         SYSTEM ID:  [id number]   Glucose by meter   Result Value Ref Range    GLUCOSE BY METER POCT 201 (H) 70 - 99 mg/dL   Lactic Acid STAT   Result Value Ref Range    Lactic Acid 1.4 0.7 - 2.0 mmol/L

## 2022-01-17 NOTE — ANESTHESIA PROCEDURE NOTES
Intrathecal Procedure Note    Pre-Procedure   Staff -        Anesthesiologist:  Tae Saucedo MD       Performed By: anesthesiologist       Location: OR       Pre-Anesthestic Checklist: patient identified, IV checked, risks and benefits discussed, informed consent, monitors and equipment checked and pre-op evaluation  Timeout:       Correct Patient: Yes        Correct Procedure: Yes        Correct Site: Yes        Correct Position: Yes   Procedure Documentation  Procedure: intrathecal       Patient Position: sitting       Patient Prep/Sterile Barriers: sterile gloves, mask, patient draped       Skin prep: Betadine       Insertion Site: L4-5. (midline approach).       Needle Gauge: 24.        Needle Length (Inches): 4        Spinal Needle Type: Pencan       Introducer used      # of attempts: 1 and  # of redirects:     Assessment/Narrative         Paresthesias: No.       CSF fluid: clear.     Comments:  No pain with injection, questions answered about procedure

## 2022-01-17 NOTE — ANESTHESIA PROCEDURE NOTES
Adductor canal Procedure Note    Pre-Procedure   Staff -        Anesthesiologist:  Tae Saucedo MD       Performed By: anesthesiologist       Location: pre-op       Pre-Anesthestic Checklist: patient identified, IV checked, site marked, risks and benefits discussed, informed consent, monitors and equipment checked, pre-op evaluation, at physician/surgeon's request and post-op pain management  Timeout:       Correct Patient: Yes        Correct Procedure: Yes        Correct Site: Yes        Correct Position: Yes        Correct Laterality: Yes        Site Marked: Yes  Procedure Documentation  Procedure: Adductor canal       Laterality: right       Patient Position: supine       Patient Prep/Sterile Barriers: sterile gloves, mask       Skin prep: Chloraprep      Local skin infiltrated with mL of 1% lidocaine.        Needle Type: short bevel       Needle Gauge: 21.        Needle Length (millimeters): 90        Ultrasound guided       1. Ultrasound was used to identify targeted nerve, plexus, vascular marker, or fascial plane and place a needle adjacent to it in real-time.       2. Ultrasound was used to visualize the spread of anesthetic in close proximity to the above referenced structure.       3. A permanent image is entered into the patient's record.       4. The visualized anatomic structures appeared normal.       5. There were no apparent abnormal pathologic findings.    Assessment/Narrative         The placement was negative for: blood aspirated and painful injection       Paresthesias: No.     Bolus given via needle..        Secured via.        Insertion/Infusion Method: Single Shot       Complications: none       Injection made incrementally with aspirations every 5 mL.    Medication(s) Administered   Bupivacaine 0.5% w/ 1:400K Epi (Injection), 15 mL  Medication Administration Time: 1/17/2022 7:22 AM

## 2022-01-17 NOTE — PROGRESS NOTES
01/17/22 1727   Quick Adds   Type of Visit Initial PT Evaluation   Living Environment   People in home spouse   Current Living Arrangements house   Home Accessibility stairs to enter home;stairs within home   Transportation Anticipated family or friend will provide   Living Environment Comments pt usually stays at daughter's apartment to care for grandkids and return home on weekends. Pt cooks at daughter's house but at home spouse does shopping, cooking and driving. Pt has a couple of steps into home and stairs to basement walk in shower   Self-Care   Usual Activity Tolerance good   Current Activity Tolerance moderate   Regular Exercise Yes   Activity/Exercise Type   (ROM ex in supine,  walks halls in daughter's apartment)   Equipment Currently Used at Home none   Activity/Exercise/Self-Care Comment Independent with ADLs, IADLs spouse does because pt cares for grandkids during the week. after discharge, pt will go home and have spouse assist   Disability/Function   Fall history within last six months no   Change in Functional Status Since Onset of Current Illness/Injury yes   General Information   Onset of Illness/Injury or Date of Surgery 01/17/22   Referring Physician Dr. Ochoa   Patient/Family Therapy Goals Statement (PT) to go home and get home PT   Pertinent History of Current Problem (include personal factors and/or comorbidities that impact the POC) Pt is a 66 year old female POD#0 right TKA   Existing Precautions/Restrictions no pivoting or twisting;fall;oxygen therapy device and L/min   Weight-Bearing Status - RLE weight-bearing as tolerated   General Observations Pt getting up to use commode with assist upon entering   Cognition   Orientation Status (Cognition) person;place;situation;time   Pain Assessment   Patient Currently in Pain Yes, see Vital Sign flowsheet   Integumentary/Edema   Integumentary/Edema Comments bandage around right knee, IV   Range of Motion (ROM)   ROM Comment WFL except for  right LE consistent wtih surgery   Strength   Strength Comments WFL except for right LE consistent with surgery   Bed Mobility   Comment (Bed Mobility) supine to sit: Ramila for right LE   Transfers   Transfer Safety Comments sit to stand: cues for hand placement, Ramila first attempt   Gait/Stairs (Locomotion)   Distance in Feet (Required for LE Total Joints) 50ft   Comment (Gait/Stairs) Pt ambulated with FWW and CGA and cues WBAT   Balance   Balance Comments requires AD   Sensory Examination   Sensory Perception patient reports no sensory changes   Coordination   Coordination no deficits were identified   Clinical Impression   Criteria for Skilled Therapeutic Intervention yes, treatment indicated   PT Diagnosis (PT) impaired gait   Influenced by the following impairments Decreased right knee ROM and strength, supplemental O2,   Functional limitations due to impairments below baseline for bed mob, transfers, gait   Clinical Presentation Stable/Uncomplicated   Clinical Presentation Rationale clnical judgment, current status   Clinical Decision Making (Complexity) low complexity   Therapy Frequency (PT) 2x/day   Predicted Duration of Therapy Intervention (days/wks) 2 days   Planned Therapy Interventions (PT) bed mobility training;gait training;home exercise program;patient/family education;ROM (range of motion);strengthening;transfer training;progressive activity/exercise   Anticipated Equipment Needs at Discharge (PT)   (pt has walker)   Risk & Benefits of therapy have been explained evaluation/treatment results reviewed;care plan/treatment goals reviewed;risks/benefits reviewed;current/potential barriers reviewed;participants voiced agreement with care plan;patient;spouse/significant other   PT Discharge Planning    PT Rationale for DC Rec Pt lives during week at [facility] apartment where she cares for grandchildren and goes home to spouse on weekends. After surgery, pt will be in house with spouse with a couple of  steps to enter and stairs to basement walk in shower. Anticipate that pt will be able to return home with use of walker, assist on stairs and as needed for ADLs. Spouse reports that he wants home PT until pt is more mobile and that it is a burden for him to assist leaving home for OP PT.   PT Brief overview of current status  Ramila bed mobility, gait 50ft iwth CGA/SBA using walker. Very slow and deliberate gait.    Therapy Certification   Start of care date 01/17/22   Certification date from 01/17/22   Certification date to 01/18/22   Medical Diagnosis right TKA   Total Evaluation Time   Total Evaluation Time (Minutes) 10

## 2022-01-17 NOTE — OR NURSING
Pre-procedural narcotic medication was drawn earlier per MDA. Now awaiting for MDA return to procedure room.

## 2022-01-17 NOTE — ANESTHESIA POSTPROCEDURE EVALUATION
Patient: Judi Moore    Procedure: Procedure(s):  RIGHT TOTAL KNEE ARTHROPLASTY       Diagnosis:Osteoarthritis of right knee, unspecified osteoarthritis type [M17.11]  Diagnosis Additional Information: No value filed.    Anesthesia Type:  Spinal    Note:  Disposition: Inpatient   Postop Pain Control: Uneventful            Sign Out: Well controlled pain   PONV: No   Neuro/Psych: Uneventful            Sign Out: Acceptable/Baseline neuro status   Airway/Respiratory: Uneventful            Sign Out: Acceptable/Baseline resp. status   CV/Hemodynamics: Uneventful            Sign Out: Acceptable CV status   Other NRE: NONE   DID A NON-ROUTINE EVENT OCCUR? No           Last vitals:  Vitals Value Taken Time   /70 01/17/22 1150   Temp 36.1  C (97  F) 01/17/22 1100   Pulse 73 01/17/22 1154   Resp 29 01/17/22 1154   SpO2 97 % 01/17/22 1154   Vitals shown include unvalidated device data.    Electronically Signed By: Tae Saucedo MD  January 17, 2022  2:26 PM

## 2022-01-17 NOTE — ANESTHESIA CARE TRANSFER NOTE
Patient: Judi Moore    Procedure: Procedure(s):  RIGHT TOTAL KNEE ARTHROPLASTY       Diagnosis: Osteoarthritis of right knee, unspecified osteoarthritis type [M17.11]  Diagnosis Additional Information: No value filed.    Anesthesia Type:   Spinal     Note:      Level of Consciousness: awake  Oxygen Supplementation: room air    Independent Airway: airway patency satisfactory and stable        Patient transferred to: PACU  Comments: At end of procedure, spontaneous respirations, patient alert to voice, able to follow commands. Patient breathing room air to PACU. Oxygen tubing connected to wall O2 in PACU, SpO2, NiBP, and EKG monitors and alarms on and functioning, Krista Hugger warmer connected to patient gown, report on patient's clinical status given to PACU RN, RN questions answered.  Handoff Report: Identifed the Patient, Identified the Reponsible Provider, Reviewed the pertinent medical history, Discussed the surgical course, Reviewed Intra-OP anesthesia mangement and issues during anesthesia, Set expectations for post-procedure period and Allowed opportunity for questions and acknowledgement of understanding        Electronically Signed By: PASTOR Alfred CRNA  January 17, 2022  9:49 AM

## 2022-01-18 ENCOUNTER — APPOINTMENT (OUTPATIENT)
Dept: PHYSICAL THERAPY | Facility: CLINIC | Age: 67
End: 2022-01-18
Attending: ORTHOPAEDIC SURGERY
Payer: MEDICARE

## 2022-01-18 ENCOUNTER — APPOINTMENT (OUTPATIENT)
Dept: OCCUPATIONAL THERAPY | Facility: CLINIC | Age: 67
End: 2022-01-18
Attending: ORTHOPAEDIC SURGERY
Payer: MEDICARE

## 2022-01-18 VITALS
TEMPERATURE: 97.8 F | DIASTOLIC BLOOD PRESSURE: 62 MMHG | HEIGHT: 62 IN | WEIGHT: 190 LBS | HEART RATE: 68 BPM | BODY MASS INDEX: 34.96 KG/M2 | RESPIRATION RATE: 16 BRPM | OXYGEN SATURATION: 95 % | SYSTOLIC BLOOD PRESSURE: 145 MMHG

## 2022-01-18 LAB
ANION GAP SERPL CALCULATED.3IONS-SCNC: 3 MMOL/L (ref 3–14)
BUN SERPL-MCNC: 33 MG/DL (ref 7–30)
CALCIUM SERPL-MCNC: 8.6 MG/DL (ref 8.5–10.1)
CHLORIDE BLD-SCNC: 107 MMOL/L (ref 94–109)
CO2 SERPL-SCNC: 26 MMOL/L (ref 20–32)
CREAT SERPL-MCNC: 1.6 MG/DL (ref 0.52–1.04)
GFR SERPL CREATININE-BSD FRML MDRD: 35 ML/MIN/1.73M2
GLUCOSE BLD-MCNC: 188 MG/DL (ref 70–99)
GLUCOSE BLDC GLUCOMTR-MCNC: 179 MG/DL (ref 70–99)
GLUCOSE BLDC GLUCOMTR-MCNC: 194 MG/DL (ref 70–99)
GLUCOSE BLDC GLUCOMTR-MCNC: 303 MG/DL (ref 70–99)
HGB BLD-MCNC: 12.5 G/DL (ref 11.7–15.7)
POTASSIUM BLD-SCNC: 4 MMOL/L (ref 3.4–5.3)
SODIUM SERPL-SCNC: 136 MMOL/L (ref 133–144)

## 2022-01-18 PROCEDURE — 97530 THERAPEUTIC ACTIVITIES: CPT | Mod: GP,CQ | Performed by: PHYSICAL THERAPY ASSISTANT

## 2022-01-18 PROCEDURE — 82962 GLUCOSE BLOOD TEST: CPT

## 2022-01-18 PROCEDURE — 99232 SBSQ HOSP IP/OBS MODERATE 35: CPT | Performed by: INTERNAL MEDICINE

## 2022-01-18 PROCEDURE — 85018 HEMOGLOBIN: CPT | Performed by: ORTHOPAEDIC SURGERY

## 2022-01-18 PROCEDURE — 36415 COLL VENOUS BLD VENIPUNCTURE: CPT | Performed by: PHYSICIAN ASSISTANT

## 2022-01-18 PROCEDURE — 97116 GAIT TRAINING THERAPY: CPT | Mod: GP,CQ | Performed by: PHYSICAL THERAPY ASSISTANT

## 2022-01-18 PROCEDURE — 97535 SELF CARE MNGMENT TRAINING: CPT | Mod: GO

## 2022-01-18 PROCEDURE — 250N000013 HC RX MED GY IP 250 OP 250 PS 637: Performed by: ORTHOPAEDIC SURGERY

## 2022-01-18 PROCEDURE — 97110 THERAPEUTIC EXERCISES: CPT | Mod: GP,CQ | Performed by: PHYSICAL THERAPY ASSISTANT

## 2022-01-18 PROCEDURE — 80048 BASIC METABOLIC PNL TOTAL CA: CPT | Performed by: PHYSICIAN ASSISTANT

## 2022-01-18 PROCEDURE — 97165 OT EVAL LOW COMPLEX 30 MIN: CPT | Mod: GO

## 2022-01-18 PROCEDURE — 250N000013 HC RX MED GY IP 250 OP 250 PS 637: Performed by: PHYSICIAN ASSISTANT

## 2022-01-18 RX ORDER — ASPIRIN 81 MG/1
81 TABLET ORAL DAILY
Refills: 0
Start: 2022-02-16

## 2022-01-18 RX ORDER — ACETAMINOPHEN 325 MG/1
650 TABLET ORAL EVERY 4 HOURS PRN
Refills: 0
Start: 2022-01-20

## 2022-01-18 RX ADMIN — POLYETHYLENE GLYCOL 3350 17 G: 17 POWDER, FOR SOLUTION ORAL at 08:57

## 2022-01-18 RX ADMIN — INSULIN ASPART 2 UNITS: 100 INJECTION, SOLUTION INTRAVENOUS; SUBCUTANEOUS at 08:56

## 2022-01-18 RX ADMIN — SENNOSIDES AND DOCUSATE SODIUM 1 TABLET: 50; 8.6 TABLET ORAL at 08:57

## 2022-01-18 RX ADMIN — HYDROMORPHONE HYDROCHLORIDE 4 MG: 2 TABLET ORAL at 04:19

## 2022-01-18 RX ADMIN — LOSARTAN POTASSIUM 100 MG: 100 TABLET, FILM COATED ORAL at 08:57

## 2022-01-18 RX ADMIN — AMLODIPINE BESYLATE 10 MG: 10 TABLET ORAL at 08:57

## 2022-01-18 RX ADMIN — HYDROMORPHONE HYDROCHLORIDE 4 MG: 2 TABLET ORAL at 08:57

## 2022-01-18 RX ADMIN — CARVEDILOL 25 MG: 12.5 TABLET, FILM COATED ORAL at 16:59

## 2022-01-18 RX ADMIN — INSULIN ASPART 7 UNITS: 100 INJECTION, SOLUTION INTRAVENOUS; SUBCUTANEOUS at 16:55

## 2022-01-18 RX ADMIN — ACETAMINOPHEN 975 MG: 325 TABLET, FILM COATED ORAL at 06:18

## 2022-01-18 RX ADMIN — CARVEDILOL 25 MG: 12.5 TABLET, FILM COATED ORAL at 08:57

## 2022-01-18 RX ADMIN — ASPIRIN 325 MG: 325 TABLET, COATED ORAL at 08:57

## 2022-01-18 NOTE — PROGRESS NOTES
Patient vital signs are at baseline: Yes  Patient able to ambulate as they were prior to admission or with assist devices provided by therapies during their stay:  yes,  Patient able to tolerate oral intake:  Yes  Pain has adequate pain control using Oral analgesics: denies pain

## 2022-01-18 NOTE — DISCHARGE INSTRUCTIONS
"You were not identified by the Ortho Team as qualifying for Home Health Services.  If you wish to pursue this with a different healthcare team, please make a \"hosptial followup appointment\" with your primary care provider:     Abelardo Pickard MD  -  Family Medicine  [phone] (Work) / [phone] (Fax)  [address], [place] [address]    Please see homecare quality ratings for all homecares in your area at https://www.medicare.gov/care-compare/#search  "

## 2022-01-18 NOTE — PROGRESS NOTES
[facility]    Hospitalist Progress Note    Assessment & Plan   66 year old female who was admitted on 1/17/2022 for elective right TKA:    Impression:   Principal Problem:    Osteoarthritis of right knee -- S/P TKA 1/17/22   -- POD # 1, doing well    Active Problems:    DM type 2 -- hgb A1C 6.9 on 12/23/21    CRF (chronic renal failure), stage 3      Plan:  Discussed with patient and , and anticipate discharge this afternoon after PT.     DVT Prophylaxis: aspirin 325 mg daily for 1 month  Code Status: Full Code    Disposition: Expected discharge home with Home PT this afternoon.     [facility] Lancaster [facility]  Pager [phone]  Cell Phone [phone]  Text Page (7am to 6pm)    Interval History   Feels OK, no nausea, eating well, reports pain is manageable.     Physical Exam   Temp: 97.8  F (36.6  C) Temp src: Oral BP: (!) 152/67 Pulse: 68   Resp: 16 SpO2: 92 % O2 Device: Nasal cannula Oxygen Delivery: 2 LPM  Vitals:    01/17/22 0555   Weight: 86.2 kg (190 lb)     Vital Signs with Ranges  Temp:  [96.6  F (35.9  C)-98  F (36.7  C)] 97.8  F (36.6  C)  Pulse:  [64-92] 68  Resp:  [9-29] 16  BP: (128-167)/(63-95) 152/67  SpO2:  [92 %-98 %] 92 %  I/O last 3 completed shifts:  In: 1610 [P.O.:250; I.V.:1360]  Out: 1125 [Urine:1100; Blood:25]    # Pain Assessment:  Current Pain Score 1/18/2022   Patient currently in pain? sleeping, patient not able to self report   - Judi is experiencing pain due to right knee surgery. Pain management was discussed and the plan was created in a collaborative fashion.  Judi's response to the current recommendations: engaged  - Please see the plan for pain management as documented above    Constitutional: Awake, alert, cooperative, no apparent distress  Respiratory: Clear to auscultation bilaterally, no crackles or wheezing  Cardiovascular: Regular rate and rhythm, normal S1 and S2, and no murmur noted  GI: Normal bowel sounds, soft, non-distended,  non-tender  Extrem: No calf tenderness, no ankle edema, right knee with dressing  Neuro: Ox3, no focal motor or sensory deficits    Medications     sodium chloride 125 mL/hr at 01/17/22 1240       acetaminophen  975 mg Oral Q8H     amLODIPine  10 mg Oral Daily     aspirin  325 mg Oral Daily     atorvastatin  10 mg Oral QPM     carvedilol  25 mg Oral BID w/meals     insulin aspart  1-10 Units Subcutaneous TID AC     insulin aspart  1-7 Units Subcutaneous At Bedtime     insulin glargine  7 Units Subcutaneous At Bedtime     losartan  100 mg Oral Daily     polyethylene glycol  17 g Oral Daily     senna-docusate  1 tablet Oral BID     sodium chloride (PF)  3 mL Intracatheter Q8H       Data   Recent Labs   Lab 01/18/22  0804 01/18/22  0747 01/18/22  0201 01/17/22  1731 01/17/22  1425 01/17/22  1013 01/17/22  0704   HGB  --  12.5  --   --   --   --   --    NA  --  136  --   --  141  --   --    POTASSIUM  --  4.0  --   --  3.7  --  4.0   CHLORIDE  --  107  --   --  108  --   --    CO2  --  26  --   --  27  --   --    BUN  --  33*  --   --  38*  --   --    CR  --  1.60*  --   --  1.56*  --  1.89*   ANIONGAP  --  3  --   --  6  --   --    JURGEN  --  8.6  --   --  9.0  --   --    * 188* 194*   < > 103*   < > 291*    < > = values in this interval not displayed.       Imaging:   Recent Results (from the past 24 hour(s))   XR Knee Port Right 1/2 Views    Narrative    EXAM: RIGHT KNEE PORTABLE ONE TO TWO VIEWS  DATE/TIME: 1/17/2022 10:37 AM     INDICATION: Right knee postoperative follow-up.   COMPARISON: None.      Impression    IMPRESSION:  1.  Changes of total knee arthroplasty with patellar resurfacing.  2.  The components are well seated without evidence of complication.  3.  No fracture or joint malalignment.  4.  Postoperative intra-articular and soft tissue gas.  5.  Anterior skin damian.       DYLAN ELE MD         SYSTEM ID:  [id number]

## 2022-01-18 NOTE — PROGRESS NOTES
Patient vital signs are at baseline: Yes  Patient able to ambulate as they were prior to admission or with assist devices provided by therapies during their stay:  yes,  Patient able to tolerate oral intake:  Yes  Pain has adequate pain control using Oral analgesics: Po Dilaudid, scheduled tylenol.     Patient A&Ox4, VSS on 2L NC. IV SL. Up with A1 & walker to BR. Void adequately. BG check . Pain managed with PO dilaudid , Tylenol. CMS intact. Dressing CDI. Possible discharge today.

## 2022-01-18 NOTE — PROGRESS NOTES
"   01/18/22 0901   Quick Adds   Type of Visit Initial Occupational Therapy Evaluation   Living Environment   People in home spouse   Current Living Arrangements house   Home Accessibility stairs to enter home;stairs within home   Transportation Anticipated family or friend will provide   Living Environment Comments walk in shower in the basement which she will not go into.  home 24/7. repots tub shower. standard toilet   Self-Care   Usual Activity Tolerance good   Current Activity Tolerance moderate   Equipment Currently Used at Home none   Activity/Exercise/Self-Care Comment reports indp at baseline   Instrumental Activities of Daily Living (IADL)   IADL Comments indp at baseline. reports  can assist with all IADL at home    Disability/Function   Hearing Difficulty or Deaf no   Wear Glasses or Blind no   Fall history within last six months no   General Information   Onset of Illness/Injury or Date of Surgery 01/17/22   Referring Physician Colt Ochoa MD   Patient/Family Therapy Goal Statement (OT) to go home with home cares   Additional Occupational Profile Info/Pertinent History of Current Problem per H&P \"Judi Moore is a 66 year old female admitted on 1/17/2022 with a past medical history significant for hypertension, hyperlipidemia, type 2 diabetes, chronic kidney disease who underwent a planned right total knee arthroplasty on 1/17/2022.\" Now POD 1 TKA   Existing Precautions/Restrictions no pivoting or twisting;fall;weight bearing   Right Lower Extremity (Weight-bearing Status) weight-bearing as tolerated (WBAT)   General Observations and Info agreeable to OT   Cognitive Status Examination   Orientation Status orientation to person, place and time   Cognitive Status Comments no concerns. somewhat flat   Visual Perception   Visual Impairment/Limitations WFL   Sensory   Sensory Quick Adds No deficits were identified   Pain Assessment   Patient Currently in Pain Yes, see Vital Sign " flowsheet   Range of Motion Comprehensive   General Range of Motion bilateral upper extremity ROM WFL   Strength Comprehensive (MMT)   General Manual Muscle Testing (MMT) Assessment no strength deficits identified   Coordination   Upper Extremity Coordination No deficits were identified   Bed Mobility   Bed Mobility supine-sit   Supine-Sit [place] (Bed Mobility) contact guard   Activities of Daily Living   BADL Assessment grooming   Grooming Assessment   [place] Level (Grooming) set up   Comment (Grooming) in seated   Clinical Impression   Criteria for Skilled Therapeutic Interventions Met (OT) yes;meets criteria;skilled treatment is necessary   OT Diagnosis decreased function in ADL   OT Problem List-Impairments impacting ADL problems related to;activity tolerance impaired;pain;post-surgical precautions;mobility   Assessment of Occupational Performance 3-5 Performance Deficits   Identified Performance Deficits transfers, bathing, dressing, home mgmt   Planned Therapy Interventions (OT) ADL retraining;IADL retraining;progressive activity/exercise;home program guidelines;transfer training   Clinical Decision Making Complexity (OT) low complexity   Therapy Frequency (OT) 1x eval and treat   Anticipated Equipment Needs Upon Discharge (OT) tub bench   Risk & Benefits of therapy have been explained evaluation/treatment results reviewed;care plan/treatment goals reviewed;risks/benefits reviewed;current/potential barriers reviewed;participants voiced agreement with care plan;participants included;patient   Comment-Clinical Impression decreased function warrants skilled IP OT   OT Discharge Planning    OT Rationale for DC Rec anticipate that pt will be able to return home with assist from  with ADL and IADL. rec A of 1 for all ADL and transfers. CGA for mobility with fWW. pt and  very concerned about getting home care   OT Brief overview of current status  min A for ADL and CGA for mobility with FWW    Therapy Certification   Start of Care Date 01/18/22   Certification date from 01/18/22   Certification date to 01/18/22   Medical Diagnosis TKA   Total Evaluation Time (Minutes)   Total Evaluation Time (Minutes) 10

## 2022-01-18 NOTE — PROVIDER NOTIFICATION
Patient spouse consistently stating that he is not comfortable taking patient home. The writer and the pry RN acknowledged and explained that patient may be drowsy this morning due to pain medications. Pt has been A&O x4 since afternoon, pain well managed, assist with one/gb/walker. Patient spouse requested to talk to the provider. Awaiting for charles Cole to call back.     @ 1730: received call back from charles Cole and she requested Dr Ochoa to contact patient spouse instead.    @ 1731: called Dr. Ochoa and explained the above, and requested to talk to patient spouse, Lionel. Transferred the call to patient room. where Patient spouse and Dr. Ochoa were talking over the phone.

## 2022-01-18 NOTE — PROVIDER NOTIFICATION
"Noted OT note, \"anticipate that pt will be able to return home with assist from  with ADL and IADL. rec A of 1 for all ADL and transfers. CGA for mobility with fWW. pt and  very concerned about getting home care\"     Paged Nieves Barr PA-C and spoke with provider.  No plan for Home Care orders.  CM-RN added note for pt reference to \"discharge instructions,\"    You were not identified by the Ortho Team as qualifying for Home Health Services.  If you wish to pursue this with a different healthcare team, please make a \"hosptial followup appointment\" with your primary care provider:      Abelardo Pickard MD  -  Family Medicine  [phone] (Work) / [phone] (Fax)  [address], [place] [address]    Please see homecare quality ratings for all homecares in your area at https://www.medicare.gov/care-compare/#search        Magalis Angulo RN, BSN, PHN  [facility]  Inpatient Care Management - FLOAT  Mobile: [phone] 01/18/22 until 4pm  (after today's date, please call the patient's unit)     "

## 2022-01-18 NOTE — PROGRESS NOTES
Ortho Post Op Day 1 Day Post-Op status post Procedure(s) (LRB):  RIGHT TOTAL KNEE ARTHROPLASTY (Right)  S: Expected post op pain, well-controlled with pain meds. Tolerating PO. No nausea or vomiting. Tolerating physical therapy.     O:   Temp:  [96.6  F (35.9  C)-98  F (36.7  C)] 98  F (36.7  C)  Pulse:  [64-92] 79  Resp:  [9-29] 15  BP: (128-175)/(63-95) 167/77  SpO2:  [93 %-98 %] 96 %      General: Awake, alert, oriented, no acute distress  Extremity: RLE: ace bandage intact clean and dry. Dorsiflexion intact and strong right foot.  Neurovascular exam intact distally, calves non-tender, Alina's negative bilaterally.    Results for orders placed or performed during the hospital encounter of 01/17/22 (from the past 24 hour(s))   Glucose by meter   Result Value Ref Range    GLUCOSE BY METER POCT 244 (H) 70 - 99 mg/dL   XR Knee Port Right 1/2 Views    Narrative    EXAM: RIGHT KNEE PORTABLE ONE TO TWO VIEWS  DATE/TIME: 1/17/2022 10:37 AM     INDICATION: Right knee postoperative follow-up.   COMPARISON: None.      Impression    IMPRESSION:  1.  Changes of total knee arthroplasty with patellar resurfacing.  2.  The components are well seated without evidence of complication.  3.  No fracture or joint malalignment.  4.  Postoperative intra-articular and soft tissue gas.  5.  Anterior skin damian.       DYLAN LEE MD         SYSTEM ID:  [id number]   Glucose by meter   Result Value Ref Range    GLUCOSE BY METER POCT 201 (H) 70 - 99 mg/dL   Hospitalist IP Consult: Requesting provider? Attending physician; Patient to be seen: Routine - within 24 hours; Hospitalist Consult; Consultant may enter orders: Yes    Narrative    João Anaya MD     1/17/2022  2:41 PM  [facility]  Consult Note - Hospitalist Service     Date of Admission:  1/17/2022  Consult Requested by: Dr. Ochoa of Orthopedic Surgery  Reason for Consult: Postoperative medical comanagement of   comorbidities including  diabetes, hypertension, sleep apnea, etc.  PRIMARY CARE PROVIDER:    No Ref-Primary, Physician    Assessment & Plan   Judi Moore is a 66 year old female admitted on 1/17/2022   with a past medical history significant for hypertension,   hyperlipidemia, type 2 diabetes, chronic kidney disease who   underwent a planned right total knee arthroplasty on 1/17/2022.    The hospital service was consulted for routine medical management   postoperatively.    Osteoarthritis, status post right total knee arthroplasty,   1/17/2022  Hospital service consulted on postop day #0.  Patient doing well   postoperatively.  Vital signs are stable.  No immediate   complications reported.  EBL less than 50 mL.  -- Defer routine postoperative management including pain   medications and DVT prophylaxis to orthopedic surgery.  -- PT/OT  -- Pulmonary hygiene, incentive spirometry.    Type 2 diabetes  Hemoglobin A1c 6.9% as of 12/23/2021.  Prior to admission,   patient takes 1-50 units NovoLog 3 times daily per sliding scale,   along with carb counting (see PTA med list).  Also takes Basaglar   10 units at bedtime.  Did not take any insulin evening prior to   surgery.  Patient did receive 5 units regular insulin   perioperatively for elevated blood glucose of 291, now down to   201.  -- Patient has not eaten a meal since prior to surgery.  -- Start high-dose sliding scale NovoLog   -- Consider resuming her carb counting when taking better p.o.  -- Resume PTA Basaglar, decreased to 7 units tonight, increase if   tolerating adequate p.o.  -- Advance diet to carbohydrate controlled as tolerated    Hypertension  Hyperlipidemia  -- Resume PTA Coreg 25 mg twice daily  -- Resume amlodipine 10 mg daily  -- Resume Cozaar 100 mg daily with hold parameter  -- Hold PTA Lasix for now  -- Resume PTA Lipitor    Chronic kidney disease, stage III  Preoperative creatinine was 1.7.  Baseline appears to be around   1.5.  -- Will repeat BMP now, and  creatinine tomorrow a.m.  -- Avoid nephrotoxins including NSAIDs as able  -- IV fluids until tolerating adequate p.o  -- Hold PTA Lasix, resume pending stable creatinine    Obstructive sleep apnea  -- Resume CPAP while sleeping with home settings  -- Recommend continuous pulse oximetry/capnography during this   hospitalization.    Diet: Advance Diet as Tolerated: Regular Diet Adult     DVT Prophylaxis: Full dose aspirin daily per Ortho  Daniel Catheter: Not present  Code Status: Full Code     Disposition Plan    Discharge date Per primary orthopedic surgery service.     Entered: NYASIA Jaffe 01/17/2022, 1:08 PM        The patient's care was discussed with the attending physician,   Dr. Batres.  He is in agreement with my assessment and plan of   care.    The hospitalist service would like to thank Dr. Ochoa for the   consult.  We will continue to follow.      NYASIA Jaffe  [facility]    Patient seen and examined.  Agree with impression and plan.     João Batres MD  Pager: [phone]  Cell Phone:  [phone]      __________________________________________________________________  ____      History is obtained from the patient and EMR.      History of Present Illness   Judi Moore is a 66 year old female admitted on 1/17/2022   with a past medical history significant for hypertension,   hyperlipidemia, type 2 diabetes, chronic kidney disease who   underwent a planned right total knee arthroplasty on 1/17/2022.    Procedure was performed by Dr. Ochoa under spinal anesthesia   with abductor block.  EBL less than 50 mL.  No immediate   complications reported.    Patient is doing well postoperatively.  Denies any fever, chills,   headache, lightheadedness, chest pain, shortness of breath,   abdominal pain, nausea, vomiting, diarrhea, dysuria, or focal   weakness.  She is eager to get ambulating with PT.  She states   that she did not take her  glargine insulin last night.  She   normally takes 10 units at bedtime, along with NovoLog sliding   scale 3 times daily.  She has not yet eaten today.  Blood glucose   elevated to 291, was given 5 units of insulin perioperatively and   glucose now down to 201.  She states that her blood sugars have   been low at home, she does have hypoglycemic symptoms when this   occurs.  Hemoglobin A1c recently was 6.9.  She voices no other   concerns currently.    Review of Systems   The 10 point Review of Systems is negative other than noted in   the HPI.    Past Medical History    I have reviewed this patient's medical history and updated it   with pertinent information if needed.   Past Medical History:   Diagnosis Date     Anemia      Cataracts, bilateral      Chronic bilateral back pain     without sciatica     Chronic pain     right knee     CKD (chronic kidney disease)     stage 3     Dermatitis      Diabetic macular edema (H)      Diabetic neuropathy (H)      DM (diabetes mellitus) (H)      Essential hypertension      Gastroenteritis      Hypercholesterolemia      Hyperparathyroidism (H)      Hypertension      Knee pain, right      Medial meniscus tear     right knee     Nuclear sclerosis of both eyes      Obesity (BMI 30.0-34.9)      VANESSA (obstructive sleep apnea)     moderate     Peripheral edema      Pneumonia      Posterior vitreous detachment     both eyes     PPD positive      Retinopathy      Right carpal tunnel syndrome      Thrombocytopenia (H)        Past Surgical History   I have reviewed this patient's surgical history and updated it   with pertinent information if needed.  Past Surgical History:   Procedure Laterality Date     ABDOMEN SURGERY      appy       Social History   I have reviewed this patient's social history and updated it with   pertinent information if needed.   Social History     Tobacco Use     Smoking status: Never Smoker     Smokeless tobacco: Never Used   Vaping Use     Vaping Use: None    Substance Use Topics     Alcohol use: Never     Drug use: Never       Family History   I have reviewed this patient's family history and updated it with   pertinent information if needed.   Medical History Relation Name Comments   Good Health Brother 2       Asthma Father        Good Health Sister 5       Good Health Sister 6       Good Health Sister 7       Good Health Sister 8         Relation Name Status Comments   Brother 1   Alive     Brother 2         Father     (Age 80)     Mother     (Age 91) Old age   Sister 1   Alive     Sister 2   Alive     Sister 3   Alive     Sister 4   Alive     Sister 5         Sister 6         Sister 7         Sister 8             Medications   Prior to Admission Medications   Prescriptions Last Dose Informant Patient Reported? Taking?   amLODIPine (NORVASC) 10 MG tablet 2022 at AM Self Yes Yes   Sig: Take 10 mg by mouth daily   aspirin 81 MG EC tablet 2022 at AM Self Yes Yes   Sig: Take 81 mg by mouth daily   atorvastatin (LIPITOR) 10 MG tablet 2022 at AM Self Yes Yes   Sig: Take 10 mg by mouth daily   carvedilol (COREG) 25 MG tablet 2022 at PM Self Yes Yes   Sig: Take 25 mg by mouth 2 times daily (with meals)   furosemide (LASIX) 40 MG tablet 2022 at AM Self Yes Yes   Sig: Take 40 mg by mouth daily   insulin aspart (NOVOLOG FLEXPEN) 100 UNIT/ML pen 2022 at PM   Self Yes Yes   Sig: Inject 1-15 Units Subcutaneous 3 times daily (with meals)   Inject 1-15 units under the skin three times a day with meals.   Take 1 unit per 13 gram carb before breakfast.  Take 1 unit per 20 gram carb before lunch  Take 1 unit per 7 gram carb before dinner   insulin glargine (BASAGLAR KWIKPEN) 100 UNIT/ML pen 2022 at   PM Self Yes Yes   Sig: Inject 10 Units Subcutaneous At Bedtime   losartan (COZAAR) 100 MG tablet 2022 at AM Self Yes Yes   Sig: Take 100 mg by mouth daily   vitamin D3 (VITAMIN D3) 50 mcg (2000 units) tablet 2022 at   AM Self  Yes Yes   Sig: Take 1 tablet by mouth daily       Facility-Administered Medications: None     Allergies   Allergies   Allergen Reactions     Oxycodone-Acetaminophen      lightheadedness     Tramadol      lightheadedness     Phenothiazines Rash     Pt unsure of this       Physical Exam   Vital Signs: Temp: 97.4  F (36.3  C) Temp src: Oral BP: (!)   156/77 Pulse: 73   Resp: 9 SpO2: 93 % O2 Device: Nasal cannula   Oxygen Delivery: 2 LPM  Weight: 190 lbs 0 oz    Constitutional: Awake, alert, cooperative, no apparent distress.   ENT: Normocephalic, without obvious abnormality, atraumatic,   oropharynx with MMM.  Extraocular movements grossly intact.    Pupils equal and round.  Sclera nonicteric.  Neck: Supple.  Pulmonary: No increased work of breathing, good air exchange,   clear to auscultation bilaterally, no crackles or wheezing.  Cardiovascular: Regular rate and rhythm, normal S1 and S2, and no   murmur noted.  GI: Normal bowel sounds, soft, non-distended, non-tender.    Skin/Integumen: Warm, dry, no rashes.  Neuro: CN II-XII grossly intact.  Upper and lower extremities   strength, coordination and sensation intact bilaterally.    Psych:  Alert and oriented x 3. Normal affect.  Extremities: No lower extremity edema noted, right leg Ace wrap.    Distal pulses intact in all 4 extremities.     Data   Results for orders placed or performed during the hospital   encounter of 01/17/22 (from the past 24 hour(s))   Potassium   Result Value Ref Range    Potassium 4.0 3.4 - 5.3 mmol/L   Glucose   Result Value Ref Range    Glucose 291 (H) 70 - 99 mg/dL    Patient Fasting > 8hrs? Yes    Creatinine   Result Value Ref Range    Creatinine 1.89 (H) 0.52 - 1.04 mg/dL    GFR Estimate 29 (L) >60 mL/min/1.73m2   Glucose by meter   Result Value Ref Range    GLUCOSE BY METER POCT 244 (H) 70 - 99 mg/dL   XR Knee Port Right 1/2 Views    Narrative    EXAM: RIGHT KNEE PORTABLE ONE TO TWO VIEWS  DATE/TIME: 1/17/2022 10:37 AM     INDICATION:  Right knee postoperative follow-up.   COMPARISON: None.      Impression    IMPRESSION:  1.  Changes of total knee arthroplasty with patellar resurfacing.  2.  The components are well seated without evidence of   complication.  3.  No fracture or joint malalignment.  4.  Postoperative intra-articular and soft tissue gas.  5.  Anterior skin damian.       DYLAN LEE MD         SYSTEM ID:  [id number]   Glucose by meter   Result Value Ref Range    GLUCOSE BY METER POCT 201 (H) 70 - 99 mg/dL   Lactic Acid STAT   Result Value Ref Range    Lactic Acid 1.4 0.7 - 2.0 mmol/L        Lactic Acid STAT   Result Value Ref Range    Lactic Acid 1.4 0.7 - 2.0 mmol/L   Glucose by meter   Result Value Ref Range    GLUCOSE BY METER POCT 97 70 - 99 mg/dL   Basic metabolic panel   Result Value Ref Range    Sodium 141 133 - 144 mmol/L    Potassium 3.7 3.4 - 5.3 mmol/L    Chloride 108 94 - 109 mmol/L    Carbon Dioxide (CO2) 27 20 - 32 mmol/L    Anion Gap 6 3 - 14 mmol/L    Urea Nitrogen 38 (H) 7 - 30 mg/dL    Creatinine 1.56 (H) 0.52 - 1.04 mg/dL    Calcium 9.0 8.5 - 10.1 mg/dL    Glucose 103 (H) 70 - 99 mg/dL    GFR Estimate 36 (L) >60 mL/min/1.73m2   Glucose by meter   Result Value Ref Range    GLUCOSE BY METER POCT 206 (H) 70 - 99 mg/dL   Glucose by meter   Result Value Ref Range    GLUCOSE BY METER POCT 278 (H) 70 - 99 mg/dL   Glucose by meter   Result Value Ref Range    GLUCOSE BY METER POCT 194 (H) 70 - 99 mg/dL       Impression/Plan:   66 year old female, post op day 1 Day Post-Op, status post Procedure(s):  RIGHT TOTAL KNEE ARTHROPLASTY  -Stable orthopaedically  -Continue physical therapy  -Continue Dilaudid and Tylenol for pain control  -awaiting am hgb result  -probable discharge later today    Kelsey Guerrero PAC  [phone]  1/18/2022  7:17 AM

## 2022-01-18 NOTE — PROGRESS NOTES
Received a phone call from Kelsey at Dr. Ochoa's office. Dr. Ochoa did agree to order home PT for this patient. Writer arranged for services through [facility]. Orders updated.

## 2022-01-19 LAB — GLUCOSE BLDC GLUCOMTR-MCNC: 212 MG/DL (ref 70–99)

## 2022-01-19 NOTE — DISCHARGE SUMMARY
Physician Discharge Summary     Patient ID:  Judi Moore  [id number]  66 year old  1955    Admit date: 1/17/2022    Discharge date and time: 1/18/2022  6:55 PM     Admitting Physician: Colt Ochoa MD     Discharge Physician: SAME    Admission Diagnoses: Osteoarthritis of right knee, unspecified osteoarthritis type [M17.11]  S/P total knee arthroplasty, right [Z96.651]    Discharge Diagnoses: SAME    Admission Condition: good    Discharged Condition: good    Indication for Admission: Judi Freeman is a 66 year old female with a history of right knee pain progressing to symptoms refractory to conservative measures and x-ray findings consistent with advanced osteoarthritis. Total joint replacement surgery was offered and discussed and the patient wishes to proceed.    Hospital Course: The patient was admitted to [facility] on 1/17/2022 and underwent right TKA. She tolerated the procedure and was transferred to the floor in stable condition. She received perioperative IV antibiotics. Postoperatively, the [facility] hospitalist service was consulted for medical co-management. She was seen and mobilized with the physical therapist on the day of surgery in the evening and again the next day. Arrangements were made for home PT. The day following surgery she was started on aspirin for DVT prophylaxis, her pain was controlled on oral medication and her hemoglobin was 12.5. On POD #1 she was voiding without difficulty, tolerating a regular diet and VSS. It was therefore felt she was ready for discharge to home on 1/18/2022.    Consults: [facility] Hospitalist Service    Significant Diagnostic Studies:   1/17/22   X-rays right knee: Proper alignment and fixation right total knee components  1/18/22   hemoglobin:  12.5    Treatments: 1/17/2022 Right Total Knee Arthroplasty    Discharge Exam:  RLE:  Ace bandage intact clean and dry.Mod knee swelling noted. Dorsiflexion right foot intact and strong. N/V intact distally. Calves  soft/NT    Disposition: home    Patient Instructions:   Discharge Medication List as of 1/18/2022  6:05 PM      START taking these medications    Details   acetaminophen (TYLENOL) 325 MG tablet Take 2 tablets (650 mg) by mouth every 4 hours as needed for other (For optimal non-opioid multimodal pain management to improve pain control.), R-0, No Print Out      HYDROmorphone (DILAUDID) 2 MG tablet Take 1 tablet (2 mg) by mouth every 4 hours as needed for moderate to severe pain, Disp-50 tablet, R-0, E-Prescribe      hydrOXYzine (ATARAX) 10 MG tablet Take 1 tablet (10 mg) by mouth every 6 hours as needed for itching or anxiety (with pain, moderate pain), Disp-30 tablet, R-0, E-Prescribe      magnesium hydroxide (MILK OF MAGNESIA) 400 MG/5ML suspension Take 30 mLs by mouth daily as needed for constipation, R-0, No Print Out      senna-docusate (SENOKOT-S/PERICOLACE) 8.6-50 MG tablet Take 1-2 tablets by mouth 2 times daily Take while on oral narcotics to prevent or treat constipation., Disp-30 tablet, R-0, E-PrescribeWhile taking narcotics         CONTINUE these medications which have CHANGED    Details   !! aspirin (ASA) 325 MG EC tablet Take 1 tablet (325 mg) by mouth daily, Disp-30 tablet, R-0, E-Prescribe      !! aspirin 81 MG EC tablet Take 1 tablet (81 mg) by mouth daily, R-0, No Print Out       !! - Potential duplicate medications found. Please discuss with provider.      CONTINUE these medications which have NOT CHANGED    Details   amLODIPine (NORVASC) 10 MG tablet Take 10 mg by mouth daily, Historical      atorvastatin (LIPITOR) 10 MG tablet Take 10 mg by mouth daily, Historical      carvedilol (COREG) 25 MG tablet Take 25 mg by mouth 2 times daily (with meals), Historical      furosemide (LASIX) 40 MG tablet Take 40 mg by mouth daily, Historical      insulin aspart (NOVOLOG FLEXPEN) 100 UNIT/ML pen Inject 1-15 Units Subcutaneous 3 times daily (with meals) Inject 1-15 units under the skin three times a day with  meals.   Take 1 unit per 13 gram carb before breakfast.  Take 1 unit per 20 gram carb before lunch  Take 1 unit per 7 gram carb before dinne r, Historical      insulin glargine (BASAGLAR KWIKPEN) 100 UNIT/ML pen Inject 10 Units Subcutaneous At Bedtime, Historical      losartan (COZAAR) 100 MG tablet Take 100 mg by mouth daily, Historical      vitamin D3 (VITAMIN D3) 50 mcg (2000 units) tablet Take 1 tablet by mouth daily , Historical           Activity: WBAT with walker for assistance  Diet: regular diet  Wound Care:   Keep dressing intact clean and dry  May shower with dressing in place  Ice right knee daily    Follow-up with Dr Colt Ochoa in 10-14 days.    Signed:  Nieves Barr PA-C  1/19/2022  3:16 PM

## 2022-01-19 NOTE — PLAN OF CARE
Patient vital signs are at baseline: Yes  Patient able to ambulate as they were prior to admission or with assist devices provided by therapies during their stay:  Yes  Patient MUST void prior to discharge:  Yes  Patient able to tolerate oral intake:  Yes  Pain has adequate pain control using Oral analgesics:  Yes   A/OX4,cms intact.Finally after talking with Ortho doc ,  decided to take pt home.Denies pain.Up with 1/walker.Dreg CDI. All discharge medications and papers explained and given to the pt.

## 2022-01-24 ENCOUNTER — APPOINTMENT (OUTPATIENT)
Dept: ULTRASOUND IMAGING | Facility: CLINIC | Age: 67
End: 2022-01-24
Attending: EMERGENCY MEDICINE
Payer: MEDICARE

## 2022-01-24 ENCOUNTER — HOSPITAL ENCOUNTER (EMERGENCY)
Facility: CLINIC | Age: 67
Discharge: HOME | End: 2022-01-24
Attending: EMERGENCY MEDICINE | Admitting: EMERGENCY MEDICINE
Payer: MEDICARE

## 2022-01-24 VITALS
BODY MASS INDEX: 34.96 KG/M2 | HEIGHT: 62 IN | TEMPERATURE: 99.1 F | RESPIRATION RATE: 16 BRPM | HEART RATE: 80 BPM | WEIGHT: 190 LBS | OXYGEN SATURATION: 94 % | DIASTOLIC BLOOD PRESSURE: 79 MMHG | SYSTOLIC BLOOD PRESSURE: 148 MMHG

## 2022-01-24 DIAGNOSIS — G89.18 ACUTE POST-OPERATIVE PAIN: ICD-10-CM

## 2022-01-24 DIAGNOSIS — L03.115 CELLULITIS OF RIGHT LEG: ICD-10-CM

## 2022-01-24 LAB
ANION GAP SERPL CALCULATED.3IONS-SCNC: 5 MMOL/L (ref 3–14)
BASOPHILS # BLD AUTO: 0.1 10E3/UL (ref 0–0.2)
BASOPHILS NFR BLD AUTO: 1 %
BUN SERPL-MCNC: 33 MG/DL (ref 7–30)
CALCIUM SERPL-MCNC: 9.2 MG/DL (ref 8.5–10.1)
CHLORIDE BLD-SCNC: 100 MMOL/L (ref 94–109)
CO2 SERPL-SCNC: 29 MMOL/L (ref 20–32)
CREAT SERPL-MCNC: 1.67 MG/DL (ref 0.52–1.04)
CRP SERPL-MCNC: 98.9 MG/L (ref 0–8)
EOSINOPHIL # BLD AUTO: 0.3 10E3/UL (ref 0–0.7)
EOSINOPHIL NFR BLD AUTO: 3 %
ERYTHROCYTE [DISTWIDTH] IN BLOOD BY AUTOMATED COUNT: 13.7 % (ref 10–15)
ERYTHROCYTE [SEDIMENTATION RATE] IN BLOOD BY WESTERGREN METHOD: 57 MM/HR (ref 0–30)
GFR SERPL CREATININE-BSD FRML MDRD: 33 ML/MIN/1.73M2
GLUCOSE BLD-MCNC: 224 MG/DL (ref 70–99)
HCT VFR BLD AUTO: 38 % (ref 35–47)
HGB BLD-MCNC: 12 G/DL (ref 11.7–15.7)
HOLD SPECIMEN: NORMAL
IMM GRANULOCYTES # BLD: 0.2 10E3/UL
IMM GRANULOCYTES NFR BLD: 2 %
LACTATE SERPL-SCNC: 0.9 MMOL/L (ref 0.7–2)
LYMPHOCYTES # BLD AUTO: 1.5 10E3/UL (ref 0.8–5.3)
LYMPHOCYTES NFR BLD AUTO: 15 %
MCH RBC QN AUTO: 28.4 PG (ref 26.5–33)
MCHC RBC AUTO-ENTMCNC: 31.6 G/DL (ref 31.5–36.5)
MCV RBC AUTO: 90 FL (ref 78–100)
MONOCYTES # BLD AUTO: 1 10E3/UL (ref 0–1.3)
MONOCYTES NFR BLD AUTO: 10 %
NEUTROPHILS # BLD AUTO: 6.5 10E3/UL (ref 1.6–8.3)
NEUTROPHILS NFR BLD AUTO: 69 %
NRBC # BLD AUTO: 0 10E3/UL
NRBC BLD AUTO-RTO: 0 /100
PLATELET # BLD AUTO: 196 10E3/UL (ref 150–450)
POTASSIUM BLD-SCNC: 3.8 MMOL/L (ref 3.4–5.3)
RBC # BLD AUTO: 4.23 10E6/UL (ref 3.8–5.2)
SODIUM SERPL-SCNC: 134 MMOL/L (ref 133–144)
WBC # BLD AUTO: 9.4 10E3/UL (ref 4–11)

## 2022-01-24 PROCEDURE — 93971 EXTREMITY STUDY: CPT | Mod: RT

## 2022-01-24 PROCEDURE — 87040 BLOOD CULTURE FOR BACTERIA: CPT | Performed by: EMERGENCY MEDICINE

## 2022-01-24 PROCEDURE — 36415 COLL VENOUS BLD VENIPUNCTURE: CPT | Performed by: EMERGENCY MEDICINE

## 2022-01-24 PROCEDURE — 85004 AUTOMATED DIFF WBC COUNT: CPT | Performed by: EMERGENCY MEDICINE

## 2022-01-24 PROCEDURE — 80048 BASIC METABOLIC PNL TOTAL CA: CPT | Performed by: EMERGENCY MEDICINE

## 2022-01-24 PROCEDURE — 99284 EMERGENCY DEPT VISIT MOD MDM: CPT | Mod: 25

## 2022-01-24 PROCEDURE — 85652 RBC SED RATE AUTOMATED: CPT | Performed by: EMERGENCY MEDICINE

## 2022-01-24 PROCEDURE — 83605 ASSAY OF LACTIC ACID: CPT | Performed by: EMERGENCY MEDICINE

## 2022-01-24 PROCEDURE — 86140 C-REACTIVE PROTEIN: CPT | Performed by: EMERGENCY MEDICINE

## 2022-01-24 RX ORDER — CEPHALEXIN 500 MG/1
500 CAPSULE ORAL 3 TIMES DAILY
Qty: 21 CAPSULE | Refills: 0 | Status: SHIPPED | OUTPATIENT
Start: 2022-01-24 | End: 2022-01-31

## 2022-01-24 ASSESSMENT — ENCOUNTER SYMPTOMS
CHILLS: 0
WOUND: 1
FEVER: 0
NAUSEA: 0

## 2022-01-24 ASSESSMENT — MIFFLIN-ST. JEOR: SCORE: 1350.08

## 2022-01-24 NOTE — ED PROVIDER NOTES
History   Chief Complaint:  Knee Pain and Post-op Problem     HPI   Judi Moore is a 66 year old female who presents via EMS for evaluation of right knee pain, 7 days s/p total right knee replacement (Dr. Ranjeet Ochoa, [facility]). The patient states that her pain and swelling started to significantly worsen 3 days ago. She last took dilaudid at 1300 and continues to have 8/10 pain. She is unable to ambulate due to the pain. She notes that she has not been elevating her leg. She called [facility] but was unable to speak with her surgeon. She has a 2-week post-op appointment scheduled for 02/01 when her dressing will be taken off. She denies fever, chills, or nausea.    Review of Systems   Constitutional: Negative for chills and fever.   Cardiovascular: Positive for leg swelling.   Gastrointestinal: Negative for nausea.   Musculoskeletal: Positive for gait problem.   Skin: Positive for wound (right knee surgical wound).   All other systems reviewed and are negative.        Allergies:  Oxycodone-Acetaminophen  Tramadol  Phenothiazines    Medications:  Aspirin   Amlodipine   Atorvastatin   Coreg  Lasix  Dilaudid  Atarax   Insulin aspart   Insulin glargine   Losartan     Past Medical History:    Anemia   Bilateral cataracts   Chronic pain   CKD   Dermatitis  Diabetic macular edema   Diabetic neuropathy   Diabetes mellitus   Hypertension  Gastroenteritis  Hypercholesterolemia   Hyperparathyroidism   Nuclear sclerosis of both eyes   VANESSA   Obesity   Peripheral edema   Pneumonia   Posterior vitreous detachment   Retinopathy   Right carpal tunnel syndrome  Thrombocytopenia  Osteoarthritis      Past Surgical History:    Appendectomy   Knee arthroplasty 01/17/2022     Social History:  Presents to the ED: alone,  in triage  Never Smoker    Physical Exam     Patient Vitals for the past 24 hrs:   BP Temp Temp src Pulse Resp SpO2 Height Weight   01/24/22 1805 (!) 148/79 -- -- 80 -- 94 % -- --   01/24/22 1730 (!) 166/69 -- -- 79  "-- 94 % -- --   01/24/22 1700 (!) 147/67 -- -- 79 -- 93 % -- --   01/24/22 1606 (!) 163/68 99.1  F (37.3  C) Oral 85 16 91 % 1.575 m (5' 2\") 86.2 kg (190 lb)       Physical Exam  General: Patient is alert and normal appearing.  HEENT: Head atraumatic    Eyes: pupils equal and reactive. Conjunctiva clear   Nares: patent   Oropharynx: no lesions, uvula midline, no palatal draping, normal voice, no trismus  Neck: Supple without lymphadenopathy, no meningismus  Chest: Heart regular rate and rhythm.   Lungs: Equal clear to auscultation with no wheeze or rales  Abdomen: Soft, non tender, nondistended, normal bowel sounds  Back: No costovertebral angle tenderness, no midline C, T or L spine tenderness  Neuro: Grossly nonfocal, normal speech, strength equal bilaterally, CN 2-12 intact  Extremities: Right leg surgical dressing clean dry and intact with obvious dried blood underneath, no significant erythema or warmth surrounding it of the knee joint.  Swelling and erythema of the right lower leg with anterior erythema.  Foot is warm and well-perfused.  Compartments soft.  Skin: Warm and dry with no rash.       Emergency Department Course     Imaging:  US Lower Extremity Venous Duplex Right   Final Result   IMPRESSION:   1.  No deep venous thrombosis in the right lower extremity seen. Some portions of the right lower extremity deep venous system is nonvisualized due to overlying dressing/bandage.        Report per radiology    Laboratory:  Labs Ordered and Resulted from Time of ED Arrival to Time of ED Departure   BASIC METABOLIC PANEL - Abnormal       Result Value    Sodium 134      Potassium 3.8      Chloride 100      Carbon Dioxide (CO2) 29      Anion Gap 5      Urea Nitrogen 33 (*)     Creatinine 1.67 (*)     Calcium 9.2      Glucose 224 (*)     GFR Estimate 33 (*)    CRP INFLAMMATION - Abnormal    CRP Inflammation 98.9 (*)    ERYTHROCYTE SEDIMENTATION RATE AUTO - Abnormal    Erythrocyte Sedimentation Rate 57 (*)  "   LACTIC ACID WHOLE BLOOD - Normal    Lactic Acid 0.9     CBC WITH PLATELETS AND DIFFERENTIAL    WBC Count 9.4      RBC Count 4.23      Hemoglobin 12.0      Hematocrit 38.0      MCV 90      MCH 28.4      MCHC 31.6      RDW 13.7      Platelet Count 196      % Neutrophils 69      % Lymphocytes 15      % Monocytes 10      % Eosinophils 3      % Basophils 1      % Immature Granulocytes 2      NRBCs per 100 WBC 0      Absolute Neutrophils 6.5      Absolute Lymphocytes 1.5      Absolute Monocytes 1.0      Absolute Eosinophils 0.3      Absolute Basophils 0.1      Absolute Immature Granulocytes 0.2      Absolute NRBCs 0.0     BLOOD CULTURE   BLOOD CULTURE     Emergency Department Course:    Reviewed:  I reviewed the patient's nursing notes, vitals and past medical history.     Assessments:  1610 I performed an exam of the patient in room ED05 as documented above.  1835 I updated the patient on results and discussed plan of care.  1838 I spoke with the patient's spouse regarding the patient's plan of care.    Consults:    1830 I consulted with Dr. Spangler from [facility].     Disposition:  The patient was discharged to home.     Impression & Plan     Medical Decision Making:  Judi Moore is a 66 year old female who presents to the emergency department today for evaluation of right leg pain 1 week status post right total knee arthroplasty.  No evidence to suggest septic joint or incision site but surgical dressing was left in place.  Ultrasound was negative for DVT.  Suspect likely dependent edema as patient states she has been sitting with her feet down mostly not elevated.  There is some anterior erythema suggestive of possible early cellulitis.  Do not suspect compartment syndrome.  Foot is warm and well-perfused with palpable good pulse.  Toes with normal cap refill.  Patient with no evidence of sepsis and a normal white blood cell count although her temperature is 99.1.  We will place her on Keflex for possible early  cellulitis.  I discussed with Dr. Paniagua he on-call for Dr. Ochoa the patient's surgeon who states that she can be seen in clinic tomorrow and agreed with the plan for discharge with outpatient follow-up and Keflex for possible early cellulitis.  Patient's ESR and CRP are mildly elevated and I ordered these mostly for trending for orthopedics.  I suspect these are elevated due to her acute postoperative state.  I did discuss with the patient's spouse as well to update him given the COVID-19 policies of known visitors in the emergency department peer  Patient ambulated independently with her walker here in the emergency department to the bathroom and back without significant pain.  Patient is agreeable with the plan and all questions and concerns addressed.    Diagnosis:    ICD-10-CM    1. Cellulitis of right leg  L03.115    2. Acute post-operative pain  G89.18        Discharge Medications:   New Prescriptions    CEPHALEXIN (KEFLEX) 500 MG CAPSULE    Take 1 capsule (500 mg) by mouth 3 times daily for 7 days       Scribe Disclosure:  I, Stephanie Alexander, am serving as a scribe at 4:08 PM on 1/24/2022 to document services personally performed by Deena Anderson MD based on my observations and the provider's statements to me.     Deena Anderson MD  01/24/22 2019

## 2022-01-24 NOTE — ED NOTES
Bed: ED05  Expected date:   Expected time:   Means of arrival:   Comments:  [id number]  66 F R knee pain/leg pain/post surgical  1350

## 2022-01-24 NOTE — ED TRIAGE NOTES
Pt arrives per EMS for increasing right knee pain and swelling the past 3 days after having knee replacement surgery on 1/17. Pt took home dilaudid at 1300 and continues to rate pain 8/10 and is unable to ambulate due to the pain.

## 2022-01-25 NOTE — DISCHARGE INSTRUCTIONS
Elevate your legs when sitting.  Start antibiotics tonight.  Call your orthopedic office in the morning and let them know you were here in the emergency department last night and we want you to be seen in clinic tomorrow.

## 2022-01-29 LAB
BACTERIA BLD CULT: NO GROWTH
BACTERIA BLD CULT: NO GROWTH

## 2025-07-19 ENCOUNTER — HOSPITAL ENCOUNTER (INPATIENT)
Facility: CLINIC | Age: 70
LOS: 3 days | Discharge: NURSING, REHAB OR LONG-TERM CARE | End: 2025-07-23
Attending: EMERGENCY MEDICINE | Admitting: INTERNAL MEDICINE
Payer: COMMERCIAL

## 2025-07-19 ENCOUNTER — APPOINTMENT (OUTPATIENT)
Dept: CT IMAGING | Facility: CLINIC | Age: 70
End: 2025-07-19
Attending: PHYSICIAN ASSISTANT
Payer: COMMERCIAL

## 2025-07-19 DIAGNOSIS — R42 DIZZINESS: ICD-10-CM

## 2025-07-19 DIAGNOSIS — M17.11 PRIMARY OSTEOARTHRITIS OF RIGHT KNEE: ICD-10-CM

## 2025-07-19 DIAGNOSIS — N18.30 CRF (CHRONIC RENAL FAILURE), STAGE 3 (MODERATE) (H): Primary | ICD-10-CM

## 2025-07-19 DIAGNOSIS — R29.810 FACIAL DROOP: ICD-10-CM

## 2025-07-19 DIAGNOSIS — I63.9 CEREBROVASCULAR ACCIDENT (CVA), UNSPECIFIED MECHANISM (H): ICD-10-CM

## 2025-07-19 DIAGNOSIS — R03.0 ELEVATED BLOOD PRESSURE READING: ICD-10-CM

## 2025-07-19 DIAGNOSIS — E11.9 TYPE 2 DIABETES MELLITUS WITHOUT COMPLICATION, UNSPECIFIED WHETHER LONG TERM INSULIN USE (H): ICD-10-CM

## 2025-07-19 LAB
ALBUMIN UR-MCNC: 70 MG/DL
ANION GAP SERPL CALCULATED.3IONS-SCNC: 12 MMOL/L (ref 7–15)
APPEARANCE UR: CLEAR
ATRIAL RATE - MUSE: 66 BPM
BACTERIA #/AREA URNS HPF: ABNORMAL /HPF
BASOPHILS # BLD AUTO: 0 10E3/UL (ref 0–0.2)
BASOPHILS NFR BLD AUTO: 1 %
BILIRUB UR QL STRIP: NEGATIVE
BUN SERPL-MCNC: 26.3 MG/DL (ref 8–23)
CALCIUM SERPL-MCNC: 9.7 MG/DL (ref 8.8–10.4)
CHLORIDE SERPL-SCNC: 98 MMOL/L (ref 98–107)
COLOR UR AUTO: ABNORMAL
CREAT SERPL-MCNC: 1.66 MG/DL (ref 0.51–0.95)
DIASTOLIC BLOOD PRESSURE - MUSE: NORMAL MMHG
EGFRCR SERPLBLD CKD-EPI 2021: 33 ML/MIN/1.73M2
EOSINOPHIL # BLD AUTO: 0.2 10E3/UL (ref 0–0.7)
EOSINOPHIL NFR BLD AUTO: 3 %
ERYTHROCYTE [DISTWIDTH] IN BLOOD BY AUTOMATED COUNT: 12.6 % (ref 10–15)
GIANT PLATELETS BLD QL SMEAR: SLIGHT
GLUCOSE BLDC GLUCOMTR-MCNC: 146 MG/DL (ref 70–99)
GLUCOSE SERPL-MCNC: 170 MG/DL (ref 70–99)
GLUCOSE UR STRIP-MCNC: 200 MG/DL
HCO3 SERPL-SCNC: 29 MMOL/L (ref 22–29)
HCT VFR BLD AUTO: 51 % (ref 35–47)
HGB BLD-MCNC: 17 G/DL (ref 11.7–15.7)
HGB UR QL STRIP: NEGATIVE
IMM GRANULOCYTES # BLD: 0 10E3/UL
IMM GRANULOCYTES NFR BLD: 0 %
INTERPRETATION ECG - MUSE: NORMAL
KETONES UR STRIP-MCNC: NEGATIVE MG/DL
LEUKOCYTE ESTERASE UR QL STRIP: NEGATIVE
LYMPHOCYTES # BLD AUTO: 1.8 10E3/UL (ref 0.8–5.3)
LYMPHOCYTES NFR BLD AUTO: 29 %
MCH RBC QN AUTO: 29.9 PG (ref 26.5–33)
MCHC RBC AUTO-ENTMCNC: 33.3 G/DL (ref 31.5–36.5)
MCV RBC AUTO: 90 FL (ref 78–100)
MONOCYTES # BLD AUTO: 0.7 10E3/UL (ref 0–1.3)
MONOCYTES NFR BLD AUTO: 11 %
NEUTROPHILS # BLD AUTO: 3.5 10E3/UL (ref 1.6–8.3)
NEUTROPHILS NFR BLD AUTO: 56 %
NITRATE UR QL: NEGATIVE
NRBC # BLD AUTO: 0 10E3/UL
NRBC BLD AUTO-RTO: 0 /100
NT-PROBNP SERPL-MCNC: 363 PG/ML (ref 0–353)
P AXIS - MUSE: 57 DEGREES
PH UR STRIP: 7.5 [PH] (ref 5–7)
PLAT MORPH BLD: ABNORMAL
PLATELET # BLD AUTO: 84 10E3/UL (ref 150–450)
POTASSIUM SERPL-SCNC: 3.5 MMOL/L (ref 3.4–5.3)
PR INTERVAL - MUSE: 130 MS
QRS DURATION - MUSE: 64 MS
QT - MUSE: 424 MS
QTC - MUSE: 444 MS
R AXIS - MUSE: 25 DEGREES
RBC # BLD AUTO: 5.68 10E6/UL (ref 3.8–5.2)
RBC MORPH BLD: ABNORMAL
RBC URINE: 1 /HPF
SODIUM SERPL-SCNC: 139 MMOL/L (ref 135–145)
SP GR UR STRIP: 1.01 (ref 1–1.03)
SQUAMOUS EPITHELIAL: 2 /HPF
SYSTOLIC BLOOD PRESSURE - MUSE: NORMAL MMHG
T AXIS - MUSE: 74 DEGREES
TROPONIN T SERPL HS-MCNC: 10 NG/L
UROBILINOGEN UR STRIP-MCNC: NORMAL MG/DL
VENTRICULAR RATE- MUSE: 66 BPM
WBC # BLD AUTO: 6.3 10E3/UL (ref 4–11)
WBC URINE: <1 /HPF

## 2025-07-19 PROCEDURE — 36415 COLL VENOUS BLD VENIPUNCTURE: CPT | Performed by: PHYSICIAN ASSISTANT

## 2025-07-19 PROCEDURE — 80061 LIPID PANEL: CPT | Performed by: HOSPITALIST

## 2025-07-19 PROCEDURE — 70450 CT HEAD/BRAIN W/O DYE: CPT

## 2025-07-19 PROCEDURE — 99285 EMERGENCY DEPT VISIT HI MDM: CPT | Mod: 25 | Performed by: EMERGENCY MEDICINE

## 2025-07-19 PROCEDURE — 84484 ASSAY OF TROPONIN QUANT: CPT | Performed by: PHYSICIAN ASSISTANT

## 2025-07-19 PROCEDURE — 82962 GLUCOSE BLOOD TEST: CPT

## 2025-07-19 PROCEDURE — 81001 URINALYSIS AUTO W/SCOPE: CPT | Performed by: EMERGENCY MEDICINE

## 2025-07-19 PROCEDURE — 83036 HEMOGLOBIN GLYCOSYLATED A1C: CPT | Performed by: HOSPITALIST

## 2025-07-19 PROCEDURE — 85018 HEMOGLOBIN: CPT | Performed by: PHYSICIAN ASSISTANT

## 2025-07-19 PROCEDURE — 80048 BASIC METABOLIC PNL TOTAL CA: CPT | Performed by: PHYSICIAN ASSISTANT

## 2025-07-19 PROCEDURE — 83880 ASSAY OF NATRIURETIC PEPTIDE: CPT | Performed by: EMERGENCY MEDICINE

## 2025-07-19 ASSESSMENT — ACTIVITIES OF DAILY LIVING (ADL)
ADLS_ACUITY_SCORE: 48

## 2025-07-19 NOTE — ED NOTES
Bed: ED10  Expected date:   Expected time:   Means of arrival:   Comments:  718 69F L sided weakness

## 2025-07-19 NOTE — ED TRIAGE NOTES
[facility]  ED Arrival Note      Means of Arrival: EMS  Comes from: Home    Story:Pt brought in by EMS from home for generalized weakness, high blood pressure for 3 days. Pt went to primary care then last night to Urgent care. Family reporting that pt has been very dizzy, weak and high blood pressures         EMS/PD Interventions: N/A  EMS Medications: N/A    Meets Stroke Criteria? No  Meets Trauma Criteria? No  Shock Index (HR/SBP): N/A      Directed to: Main ED  Belongings: Remain with patient

## 2025-07-20 ENCOUNTER — APPOINTMENT (OUTPATIENT)
Dept: MRI IMAGING | Facility: CLINIC | Age: 70
End: 2025-07-20
Attending: EMERGENCY MEDICINE
Payer: COMMERCIAL

## 2025-07-20 ENCOUNTER — APPOINTMENT (OUTPATIENT)
Dept: SPEECH THERAPY | Facility: CLINIC | Age: 70
End: 2025-07-20
Attending: INTERNAL MEDICINE
Payer: COMMERCIAL

## 2025-07-20 ENCOUNTER — APPOINTMENT (OUTPATIENT)
Dept: PHYSICAL THERAPY | Facility: CLINIC | Age: 70
End: 2025-07-20
Attending: HOSPITALIST
Payer: COMMERCIAL

## 2025-07-20 PROBLEM — R03.0 ELEVATED BLOOD PRESSURE READING: Status: ACTIVE | Noted: 2025-07-20

## 2025-07-20 PROBLEM — R29.810 FACIAL DROOP: Status: ACTIVE | Noted: 2025-07-20

## 2025-07-20 PROBLEM — R42 DIZZINESS: Status: ACTIVE | Noted: 2025-07-20

## 2025-07-20 LAB
ANION GAP SERPL CALCULATED.3IONS-SCNC: 12 MMOL/L (ref 7–15)
BUN SERPL-MCNC: 24.2 MG/DL (ref 8–23)
CALCIUM SERPL-MCNC: 9.7 MG/DL (ref 8.8–10.4)
CHLORIDE SERPL-SCNC: 100 MMOL/L (ref 98–107)
CHOLEST SERPL-MCNC: 154 MG/DL
CREAT SERPL-MCNC: 1.53 MG/DL (ref 0.51–0.95)
EGFRCR SERPLBLD CKD-EPI 2021: 36 ML/MIN/1.73M2
ERYTHROCYTE [DISTWIDTH] IN BLOOD BY AUTOMATED COUNT: 12.6 % (ref 10–15)
EST. AVERAGE GLUCOSE BLD GHB EST-MCNC: 174 MG/DL
GLUCOSE BLDC GLUCOMTR-MCNC: 164 MG/DL (ref 70–99)
GLUCOSE BLDC GLUCOMTR-MCNC: 180 MG/DL (ref 70–99)
GLUCOSE BLDC GLUCOMTR-MCNC: 329 MG/DL (ref 70–99)
GLUCOSE BLDC GLUCOMTR-MCNC: 350 MG/DL (ref 70–99)
GLUCOSE BLDC GLUCOMTR-MCNC: 361 MG/DL (ref 70–99)
GLUCOSE BLDC GLUCOMTR-MCNC: 373 MG/DL (ref 70–99)
GLUCOSE SERPL-MCNC: 166 MG/DL (ref 70–99)
HBA1C MFR BLD: 7.7 %
HCO3 SERPL-SCNC: 26 MMOL/L (ref 22–29)
HCT VFR BLD AUTO: 48.7 % (ref 35–47)
HDLC SERPL-MCNC: 64 MG/DL
HGB BLD-MCNC: 16.2 G/DL (ref 11.7–15.7)
HOLD SPECIMEN: NORMAL
LDLC SERPL CALC-MCNC: 80 MG/DL
MCH RBC QN AUTO: 29.7 PG (ref 26.5–33)
MCHC RBC AUTO-ENTMCNC: 33.3 G/DL (ref 31.5–36.5)
MCV RBC AUTO: 89 FL (ref 78–100)
NONHDLC SERPL-MCNC: 90 MG/DL
PLATELET # BLD AUTO: 146 10E3/UL (ref 150–450)
POTASSIUM SERPL-SCNC: 3.5 MMOL/L (ref 3.4–5.3)
RBC # BLD AUTO: 5.45 10E6/UL (ref 3.8–5.2)
SODIUM SERPL-SCNC: 138 MMOL/L (ref 135–145)
TRIGL SERPL-MCNC: 51 MG/DL
TROPONIN T SERPL HS-MCNC: 10 NG/L
WBC # BLD AUTO: 6 10E3/UL (ref 4–11)

## 2025-07-20 PROCEDURE — 250N000013 HC RX MED GY IP 250 OP 250 PS 637: Performed by: NURSE PRACTITIONER

## 2025-07-20 PROCEDURE — 85027 COMPLETE CBC AUTOMATED: CPT | Performed by: HOSPITALIST

## 2025-07-20 PROCEDURE — 99207 PR NO BILLABLE SERVICE THIS VISIT: CPT | Performed by: INTERNAL MEDICINE

## 2025-07-20 PROCEDURE — 70553 MRI BRAIN STEM W/O & W/DYE: CPT

## 2025-07-20 PROCEDURE — 250N000013 HC RX MED GY IP 250 OP 250 PS 637: Performed by: HOSPITALIST

## 2025-07-20 PROCEDURE — G0378 HOSPITAL OBSERVATION PER HR: HCPCS

## 2025-07-20 PROCEDURE — 36415 COLL VENOUS BLD VENIPUNCTURE: CPT | Performed by: HOSPITALIST

## 2025-07-20 PROCEDURE — A9585 GADOBUTROL INJECTION: HCPCS | Performed by: EMERGENCY MEDICINE

## 2025-07-20 PROCEDURE — 84484 ASSAY OF TROPONIN QUANT: CPT | Performed by: PHYSICIAN ASSISTANT

## 2025-07-20 PROCEDURE — 99223 1ST HOSP IP/OBS HIGH 75: CPT | Mod: FS | Performed by: NURSE PRACTITIONER

## 2025-07-20 PROCEDURE — 92610 EVALUATE SWALLOWING FUNCTION: CPT | Mod: GN | Performed by: COUNSELOR

## 2025-07-20 PROCEDURE — 83550 IRON BINDING TEST: CPT

## 2025-07-20 PROCEDURE — 97116 GAIT TRAINING THERAPY: CPT | Mod: GP

## 2025-07-20 PROCEDURE — 70549 MR ANGIOGRAPH NECK W/O&W/DYE: CPT

## 2025-07-20 PROCEDURE — 120N000001 HC R&B MED SURG/OB

## 2025-07-20 PROCEDURE — 82374 ASSAY BLOOD CARBON DIOXIDE: CPT | Performed by: HOSPITALIST

## 2025-07-20 PROCEDURE — 82668 ASSAY OF ERYTHROPOIETIN: CPT | Performed by: INTERNAL MEDICINE

## 2025-07-20 PROCEDURE — 36415 COLL VENOUS BLD VENIPUNCTURE: CPT | Performed by: PHYSICIAN ASSISTANT

## 2025-07-20 PROCEDURE — 70544 MR ANGIOGRAPHY HEAD W/O DYE: CPT

## 2025-07-20 PROCEDURE — 82962 GLUCOSE BLOOD TEST: CPT

## 2025-07-20 PROCEDURE — 250N000012 HC RX MED GY IP 250 OP 636 PS 637: Performed by: INTERNAL MEDICINE

## 2025-07-20 PROCEDURE — 99223 1ST HOSP IP/OBS HIGH 75: CPT | Performed by: INTERNAL MEDICINE

## 2025-07-20 PROCEDURE — 255N000002 HC RX 255 OP 636: Performed by: EMERGENCY MEDICINE

## 2025-07-20 PROCEDURE — 97162 PT EVAL MOD COMPLEX 30 MIN: CPT | Mod: GP

## 2025-07-20 RX ORDER — HYDRALAZINE HYDROCHLORIDE 20 MG/ML
10 INJECTION INTRAMUSCULAR; INTRAVENOUS EVERY 4 HOURS PRN
Status: DISCONTINUED | OUTPATIENT
Start: 2025-07-20 | End: 2025-07-20

## 2025-07-20 RX ORDER — ACETAMINOPHEN 650 MG/1
650 SUPPOSITORY RECTAL EVERY 4 HOURS PRN
Status: DISCONTINUED | OUTPATIENT
Start: 2025-07-20 | End: 2025-07-23 | Stop reason: HOSPADM

## 2025-07-20 RX ORDER — CLOPIDOGREL BISULFATE 75 MG/1
300 TABLET ORAL ONCE
Status: COMPLETED | OUTPATIENT
Start: 2025-07-20 | End: 2025-07-20

## 2025-07-20 RX ORDER — DEXTROSE MONOHYDRATE 25 G/50ML
25-50 INJECTION, SOLUTION INTRAVENOUS
Status: DISCONTINUED | OUTPATIENT
Start: 2025-07-20 | End: 2025-07-21

## 2025-07-20 RX ORDER — ACETAMINOPHEN 325 MG/1
650 TABLET ORAL EVERY 4 HOURS PRN
Status: DISCONTINUED | OUTPATIENT
Start: 2025-07-20 | End: 2025-07-20

## 2025-07-20 RX ORDER — HYDRALAZINE HYDROCHLORIDE 25 MG/1
25 TABLET, FILM COATED ORAL 3 TIMES DAILY
Status: ON HOLD | COMMUNITY
End: 2025-07-30

## 2025-07-20 RX ORDER — LORAZEPAM 1 MG/1
1 TABLET ORAL ONCE
Status: DISCONTINUED | OUTPATIENT
Start: 2025-07-20 | End: 2025-07-20

## 2025-07-20 RX ORDER — AMOXICILLIN 250 MG
2 CAPSULE ORAL 2 TIMES DAILY PRN
Status: DISCONTINUED | OUTPATIENT
Start: 2025-07-20 | End: 2025-07-23 | Stop reason: HOSPADM

## 2025-07-20 RX ORDER — ACETAMINOPHEN 325 MG/1
650 TABLET ORAL EVERY 4 HOURS PRN
Status: DISCONTINUED | OUTPATIENT
Start: 2025-07-20 | End: 2025-07-23 | Stop reason: HOSPADM

## 2025-07-20 RX ORDER — DEXTROSE MONOHYDRATE 25 G/50ML
25-50 INJECTION, SOLUTION INTRAVENOUS
Status: DISCONTINUED | OUTPATIENT
Start: 2025-07-20 | End: 2025-07-20

## 2025-07-20 RX ORDER — ASPIRIN 81 MG/1
81 TABLET, CHEWABLE ORAL DAILY
Status: DISCONTINUED | OUTPATIENT
Start: 2025-07-20 | End: 2025-07-23 | Stop reason: HOSPADM

## 2025-07-20 RX ORDER — ONDANSETRON 2 MG/ML
4 INJECTION INTRAMUSCULAR; INTRAVENOUS EVERY 6 HOURS PRN
Status: DISCONTINUED | OUTPATIENT
Start: 2025-07-20 | End: 2025-07-23 | Stop reason: HOSPADM

## 2025-07-20 RX ORDER — LIDOCAINE 40 MG/G
CREAM TOPICAL
Status: DISCONTINUED | OUTPATIENT
Start: 2025-07-20 | End: 2025-07-23 | Stop reason: HOSPADM

## 2025-07-20 RX ORDER — CALCIUM CARBONATE 500 MG/1
1000 TABLET, CHEWABLE ORAL 4 TIMES DAILY PRN
Status: DISCONTINUED | OUTPATIENT
Start: 2025-07-20 | End: 2025-07-23 | Stop reason: HOSPADM

## 2025-07-20 RX ORDER — ATORVASTATIN CALCIUM 20 MG/1
20 TABLET, FILM COATED ORAL EVERY EVENING
Status: DISCONTINUED | OUTPATIENT
Start: 2025-07-20 | End: 2025-07-23 | Stop reason: HOSPADM

## 2025-07-20 RX ORDER — ONDANSETRON 4 MG/1
4 TABLET, ORALLY DISINTEGRATING ORAL EVERY 6 HOURS PRN
Status: DISCONTINUED | OUTPATIENT
Start: 2025-07-20 | End: 2025-07-23 | Stop reason: HOSPADM

## 2025-07-20 RX ORDER — NICOTINE POLACRILEX 4 MG
15-30 LOZENGE BUCCAL
Status: DISCONTINUED | OUTPATIENT
Start: 2025-07-20 | End: 2025-07-21

## 2025-07-20 RX ORDER — CARVEDILOL 12.5 MG/1
12.5 TABLET ORAL 2 TIMES DAILY WITH MEALS
COMMUNITY

## 2025-07-20 RX ORDER — HYDRALAZINE HYDROCHLORIDE 50 MG/1
50 TABLET, FILM COATED ORAL 3 TIMES DAILY
COMMUNITY

## 2025-07-20 RX ORDER — CLOPIDOGREL BISULFATE 75 MG/1
75 TABLET ORAL DAILY
Status: DISCONTINUED | OUTPATIENT
Start: 2025-07-21 | End: 2025-07-23 | Stop reason: HOSPADM

## 2025-07-20 RX ORDER — GADOBUTROL 604.72 MG/ML
10 INJECTION INTRAVENOUS ONCE
Status: COMPLETED | OUTPATIENT
Start: 2025-07-20 | End: 2025-07-20

## 2025-07-20 RX ORDER — LORAZEPAM 1 MG/1
1 TABLET ORAL
Status: DISCONTINUED | OUTPATIENT
Start: 2025-07-20 | End: 2025-07-23 | Stop reason: HOSPADM

## 2025-07-20 RX ORDER — AMOXICILLIN 250 MG
1 CAPSULE ORAL 2 TIMES DAILY PRN
Status: DISCONTINUED | OUTPATIENT
Start: 2025-07-20 | End: 2025-07-23 | Stop reason: HOSPADM

## 2025-07-20 RX ORDER — NICOTINE POLACRILEX 4 MG
15-30 LOZENGE BUCCAL
Status: DISCONTINUED | OUTPATIENT
Start: 2025-07-20 | End: 2025-07-20

## 2025-07-20 RX ORDER — HYDRALAZINE HYDROCHLORIDE 20 MG/ML
5 INJECTION INTRAMUSCULAR; INTRAVENOUS EVERY 4 HOURS PRN
Status: DISCONTINUED | OUTPATIENT
Start: 2025-07-20 | End: 2025-07-23 | Stop reason: HOSPADM

## 2025-07-20 RX ADMIN — GADOBUTROL 10 ML: 604.72 INJECTION INTRAVENOUS at 04:35

## 2025-07-20 RX ADMIN — CLOPIDOGREL BISULFATE 300 MG: 75 TABLET, FILM COATED ORAL at 09:26

## 2025-07-20 RX ADMIN — INSULIN GLARGINE 10 UNITS: 100 INJECTION, SOLUTION SUBCUTANEOUS at 23:01

## 2025-07-20 RX ADMIN — ASPIRIN 81 MG CHEWABLE TABLET 81 MG: 81 TABLET CHEWABLE at 19:02

## 2025-07-20 RX ADMIN — ATORVASTATIN CALCIUM 20 MG: 20 TABLET, FILM COATED ORAL at 21:02

## 2025-07-20 RX ADMIN — ACETAMINOPHEN 650 MG: 325 TABLET ORAL at 16:57

## 2025-07-20 ASSESSMENT — ACTIVITIES OF DAILY LIVING (ADL)
ADLS_ACUITY_SCORE: 62
ADLS_ACUITY_SCORE: 62
ADLS_ACUITY_SCORE: 58
ADLS_ACUITY_SCORE: 55
ADLS_ACUITY_SCORE: 61
ADLS_ACUITY_SCORE: 58
ADLS_ACUITY_SCORE: 48
ADLS_ACUITY_SCORE: 62
ADLS_ACUITY_SCORE: 48
ADLS_ACUITY_SCORE: 62
ADLS_ACUITY_SCORE: 62
ADLS_ACUITY_SCORE: 48
ADLS_ACUITY_SCORE: 58
ADLS_ACUITY_SCORE: 55
ADLS_ACUITY_SCORE: 62
ADLS_ACUITY_SCORE: 58
ADLS_ACUITY_SCORE: 58
ADLS_ACUITY_SCORE: 48
ADLS_ACUITY_SCORE: 48
ADLS_ACUITY_SCORE: 62
ADLS_ACUITY_SCORE: 55

## 2025-07-20 NOTE — CONSULTS
[facility]    Stroke Consult Note    Reason for Consult:  stroke    Chief Complaint: Generalized Weakness     HPI  Judi Moore is a 69 year old female with past medical history of Polycythemia, HTN, DM, prior cerebellar stroke (MRI 2023, image unavailable for personal review and patient also not aware of this finding) who presented to ED yesterday with difficulty walking independently, dizziness and noted to have weak RUE per ED MD exam. CT head showed no acute intracranial pathology. Stroke team was consulted after MRI brain show acute to subacute left internal capsule ischemic stroke. MRA head/neck with no large vessel occlusion, 50 to 60%, right ICA stenosis.     On exam today, she continue to have a mild right facial droop, and mild drift on the right upper extremity and right lower extremity. However Judi report that she feels weaker on the left side. Judi also reports some baseline blurry vision, otherwise has no other overt focal motor deficits. Her speech is slower but appears baseline per daughter. Of note, daughter at bedside reported that Judi' blood pressure has been in the 180s to 200s systolically the last 2-3 days despite taking her home antihypertensives.      Stroke Evaluation Summarized  MRI/Head CT CT head: No acute intracranial pathology    MRI of the brain: Acute to subacute left internal capsule ischemic stroke.  No hemorrhage     Intracranial Vasculature MRA head: No large vessel occlusion no critical stenosis.   Cervical Vasculature MRA neck: 60-70% narrowing right ICA; otherwise no critical stenosis.     LDL  7/19/2025: 80 mg/dL   A1C  7/19/2025: 7.7 %   Troponin 7/20/2025: 10 ng/L     Impression  Acute to subacute left internal capsule ischemic stroke, etiology likely small vessel disease vs ESUS  History of prior cerebellar stroke (MRI 2023, image unavailable for personal review)    Recommendations   - Neurochecks and Vital Signs every 4 hours  -  "SBP goal <180 mmHg while inpatient, SBP goal <130/80 long-term  - Continue daily PTA aspirin 81 mg for secondary stroke prevention  - Plavix (clopidogrel) 75 mg PO Daily for 3 weeks  - Statin: Increased PTA atorvastatin 10 mg to 20 mg today for LDL goal of 40-70  - Telemetry, EKG  - Bedside Glucose Monitoring  - PT/OT/SLP  - Stroke Education  - Euthermia, Euglycemia    Pending Work-up:  - TTE    Patient Follow-up    - final recommendation pending work-up    Thank you for this consult. We will continue to follow.     PASTOR Pedroza CNP  Vascular Neurology    To page me or covering stroke neurology team member, click here: [facility]  Choose \"On Call\" tab at top, then select \"NEUROLOGY/ALL SITES\" from middle drop-down box, press Enter, then look for \"stroke\" or \"telestroke\" for your site.  _____________________________________________________    Clinically Significant Risk Factors Present on Admission                 # Drug Induced Platelet Defect: home medication list includes an antiplatelet medication   # Hypertension: Home medication list includes antihypertensive(s)          # DMII: A1C = 7.7 % (Ref range: <5.7 %) within past 6 months                 Past Medical History    Past Medical History:   Diagnosis Date    Anemia     Cataracts, bilateral     Chronic bilateral back pain     without sciatica    Chronic pain     right knee    CKD (chronic kidney disease)     stage 3    Dermatitis     Diabetic macular edema (H)     Diabetic neuropathy (H)     DM (diabetes mellitus) (H)     Essential hypertension     Gastroenteritis     Hypercholesterolemia     Hyperparathyroidism     Hypertension     Knee pain, right     Medial meniscus tear     right knee    Nuclear sclerosis of both eyes     Obesity (BMI 30.0-34.9)     VANESSA (obstructive sleep apnea)     moderate    Peripheral edema     Pneumonia     Posterior vitreous detachment     both eyes    PPD positive     Retinopathy     Right carpal tunnel syndrome     Thrombocytopenia  "     Medications   Home Meds  Prior to Admission medications    Medication Sig Start Date End Date Taking? Authorizing Provider   acetaminophen (TYLENOL) 325 MG tablet Take 2 tablets (650 mg) by mouth every 4 hours as needed for other (For optimal non-opioid multimodal pain management to improve pain control.) 1/20/22   João Anaya MD   amLODIPine (NORVASC) 10 MG tablet Take 10 mg by mouth daily    Reported, Patient   aspirin (ASA) 325 MG EC tablet Take 1 tablet (325 mg) by mouth daily 1/17/22   Colt Ochoa MD   aspirin 81 MG EC tablet Take 1 tablet (81 mg) by mouth daily 2/16/22   João Anaya MD   atorvastatin (LIPITOR) 10 MG tablet Take 10 mg by mouth daily    Reported, Patient   carvedilol (COREG) 25 MG tablet Take 25 mg by mouth 2 times daily (with meals)    Reported, Patient   furosemide (LASIX) 40 MG tablet Take 40 mg by mouth daily    Reported, Patient   HYDROmorphone (DILAUDID) 2 MG tablet Take 1 tablet (2 mg) by mouth every 4 hours as needed for moderate to severe pain 1/17/22   Colt Ochoa MD   hydrOXYzine (ATARAX) 10 MG tablet Take 1 tablet (10 mg) by mouth every 6 hours as needed for itching or anxiety (with pain, moderate pain) 1/17/22   Colt Ochoa MD   insulin aspart (NOVOLOG FLEXPEN) 100 UNIT/ML pen Inject 1-15 Units Subcutaneous 3 times daily (with meals) Inject 1-15 units under the skin three times a day with meals.   Take 1 unit per 13 gram carb before breakfast.  Take 1 unit per 20 gram carb before lunch  Take 1 unit per 7 gram carb before dinner    Reported, Patient   insulin glargine (BASAGLAR KWIKPEN) 100 UNIT/ML pen Inject 10 Units Subcutaneous At Bedtime    Reported, Patient   losartan (COZAAR) 100 MG tablet Take 100 mg by mouth daily    Reported, Patient   magnesium hydroxide (MILK OF MAGNESIA) 400 MG/5ML suspension Take 30 mLs by mouth daily as needed for constipation 1/18/22   João Anaya MD senna-blanquitausamikayla  (SENOKOT-S/PERICOLACE) 8.6-50 MG tablet Take 1-2 tablets by mouth 2 times daily Take while on oral narcotics to prevent or treat constipation. 1/17/22   Colt Ochoa MD   vitamin D3 (VITAMIN D3) 50 mcg (2000 units) tablet Take 1 tablet by mouth daily     Reported, Patient       Scheduled Meds  Current Facility-Administered Medications   Medication Dose Route Frequency Provider Last Rate Last Admin    [START ON 7/21/2025] clopidogrel (PLAVIX) tablet 75 mg  75 mg Oral Daily Crystal Barnes MD        insulin aspart (NovoLOG) injection (RAPID ACTING)  1-3 Units Subcutaneous TID AC Crystal Barnes MD        insulin aspart (NovoLOG) injection (RAPID ACTING)  1-3 Units Subcutaneous At Bedtime Crystal Barnes MD        sodium chloride (PF) 0.9% PF flush 3 mL  3 mL Intracatheter Q8H Crystal Barnes MD   3 mL at 07/20/25 0504       Infusion Meds  Current Facility-Administered Medications   Medication Dose Route Frequency Provider Last Rate Last Admin       Allergies   Allergies   Allergen Reactions    Oxycodone-Acetaminophen      lightheadedness    Tramadol      lightheadedness    Phenothiazines Rash     Pt unsure of this          PHYSICAL EXAMINATION   Temp:  [97.7  F (36.5  C)-98.3  F (36.8  C)] 97.7  F (36.5  C)  Pulse:  [63-74] 68  Resp:  [18] 18  BP: (149-201)/() 175/121  SpO2:  [94 %-100 %] 98 %    Neurologic  Mental Status:  alert, oriented x 3, follows commands, naming and repetition normal speech is slower, but family denies dysarthria  Cranial Nerves: Baseline blurry vision, mild right facial droop, visual fields intact, PERRL, EOMI with normal smooth pursuit, facial sensation intact and symmetric, hearing not formally tested but intact to conversation, palate elevation symmetric and uvula midline, shoulder shrug strong bilaterally, tongue protrusion midline  Motor: Mild drift on right upper extremity and right lower extremity.  Left side intact  Reflexes:  toes  "down-going  Sensory:  light touch sensation intact and symmetric throughout upper and lower extremities, no extinction on double simultaneous stimulation   Coordination:  normal finger-to-nose and heel-to-shin bilaterally without dysmetria, rapid alternating movements symmetric  Station/Gait:  deferred    Stroke Scales    NIHSS  1a. Level of Consciousness 0-->Alert, keenly responsive   1b. LOC Questions 0-->Answers both questions correctly   1c. LOC Commands 0-->Performs both tasks correctly   2.   Best Gaze 0-->Normal   3.   Visual 0-->No visual loss   4.   Facial Palsy 1-->Minor paralysis (flattened nasolabial fold, asymmetry on smiling) (Mild right facial droop)   5a. Motor Arm, Left 0-->No drift, limb holds 90 (or 45) degrees for full 10 secs   5b. Motor Arm, Right 1-->Drift, limb holds 90 (or 45) degrees, but drifts down before full 10 secs, does not hit bed or other support   6a. Motor Leg, Left 0-->No drift, leg holds 30 degree position for full 5 secs   6b. Motor Leg, right 1-->Drift, leg falls by the end of the 5-sec period but does not hit bed   7.   Limb Ataxia 0-->Absent   8.   Sensory 0-->Normal, no sensory loss   9.   Best Language 0-->No aphasia, normal   10. Dysarthria 0-->Normal   11. Extinction and Inattention  0-->No abnormality   Total 3 (07/20/25 1130)      Imaging  I personally reviewed all imaging; relevant findings per HPI.    Labs Data   CBC  Recent Labs   Lab 07/19/25  2114   WBC 6.3   RBC 5.68*   HGB 17.0*   HCT 51.0*   PLT 84*     Basic Metabolic Panel   Recent Labs   Lab 07/20/25  0314 07/19/25  2317 07/19/25  2308   NA  --   --  139   POTASSIUM  --   --  3.5   CHLORIDE  --   --  98   CO2  --   --  29   BUN  --   --  26.3*   CR  --   --  1.66*   * 146* 170*   JURGEN  --   --  9.7     Liver Panel  No results for input(s): \"PROTTOTAL\", \"ALBUMIN\", \"BILITOTAL\", \"ALKPHOS\", \"AST\", \"ALT\", \"BILIDIRECT\" in the last 168 hours.  INR  No lab results found.        Stroke Consult Data Data   This " was a non-emergent, non-telestroke consult.  I have personally spent a total of 60 minutes providing care today, time spent in reviewing medical records and devising the plan as recorded above.

## 2025-07-20 NOTE — PROGRESS NOTES
"Clinical Swallow Evaluation  [facility]     07/20/25 1200   Appointment Info   Signing Clinician's Name / Credentials (SLP) Jen Rodriguez MS, CCC-SLP   General Information   Onset of Illness/Injury or Date of Surgery 07/19/25   Pertinent History of Current Problem Per chart review: \"Judi Moore is a 69 year old female with a past medical history of type 2 diabetes, hypertension, dyslipidemia, sleep apnea, CKD stage IV presents to the hospital with left-sided weakness and ataxia.  Patient reports that her symptoms started 1 day prior to presentation.  She reports having difficulty ambulating.  Additionally the patient has been having elevated blood pressure readings at home with systolics in the 200s.  She presented to [facility] on July 18 for her hypertension but at that time did not have any weakness.  She was treated with a dose of hydralazine and was discharged home.  She reports that she has been compliant with her medications however her blood pressure has remained elevated.  She provides no other complaints\" Swallow evaluation ordered as part of stroke order set.   General Observations Pt alert and agreeable to evaluation. Pt and family report no concerns for swallowing. Pt was apparently cleared for diet following nurse swallow screen and has taken medications with no issues. RN reports no swallow concerns.   Type of Evaluation   Type of Evaluation Swallow Evaluation   Oral Motor   Oral Musculature generally intact   Structural Abnormalities none present   Dentition (Oral Motor)   Dentition (Oral Motor) natural dentition;adequate dentition   Facial Symmetry (Oral Motor)   Facial Symmetry (Oral Motor) WNL   Lip Function (Oral Motor)   Lip Range of Motion (Oral Motor) WNL   Lip Strength (Oral Motor) WFL   Tongue Function (Oral Motor)   Tongue ROM (Oral Motor) WNL   Tongue Strength (Oral Motor) WFL   Jaw Function (Oral Motor)   Jaw Function (Oral Motor) WNL   Cough/Swallow/Gag " Reflex (Oral Motor)   Volitional Throat Clear/Cough (Oral Motor) WNL   Volitional Swallow (Oral Motor) WNL   Vocal Quality/Secretion Management (Oral Motor)   Vocal Quality (Oral Motor) WNL   Secretion Management (Oral Motor) WNL   General Swallowing Observations   Current Diet/Method of Nutritional Intake (General Swallowing Observations, NIS) regular diet;thin liquids (level 0)   Respiratory Support room air   Past History of Dysphagia none reported or in chart review   Swallowing Evaluation Clinical swallow evaluation   Clinical Swallow Evaluation   Feeding Assistance set up only required   Clinical Swallow Evaluation Textures Trialed thin liquids;soft & bite-sized;solid foods   Clinical Swallow Eval: Thin Liquid Texture Trial   Mode of Presentation, Thin Liquids straw;self-fed   Volume of Liquid or Food Presented 2 oz   Oral Phase of Swallow WFL   Pharyngeal Phase of Swallow intact   Diagnostic Statement No overt s/sx of aspiration with all trials   Clinical Swallow Eval: Soft & Bite Sized   Mode of Presentation self-fed   Oral Phase WFL   Pharyngeal Phase intact   Diagnostic Statement No overt s/sx of aspiration with all trials   Clinical Swallow Evaluation: Solid Food Texture Trial   Mode of Presentation self-fed   Oral Phase WFL   Pharyngeal Phase intact   Diagnostic Statement No overt s/sx aspiration with all trials   Esophageal Phase of Swallow   Patient reports or presents with symptoms of esophageal dysphagia No   Swallowing Recommendations   Diet Consistency Recommendations regular diet;thin liquids (level 0)   Supervision Level for Intake patient independent   Monitoring/Assistance Required (Eating/Swallowing) monitor for cough or change in vocal quality with intake   Recommended Feeding/Eating Techniques (Swallow Eval) maintain upright sitting position for eating;maintain upright posture during/after eating for 30 minutes;minimize distractions during oral intake   Medication Administration  Recommendations, Swallowing (SLP) per pt preference   Instrumental Assessment Recommendations instrumental evaluation not recommended at this time   Clinical Impression   Criteria for Skilled Therapeutic Interventions Met (SLP Eval) Evaluation only   SLP Diagnosis No appreciable dysphagia   Risks & Benefits of therapy have been explained evaluation/treatment results reviewed;participants voiced agreement with care plan;participants included;patient;daughter   Clinical Impression Comments Clinical swallow evaluation completed per physician order. No appreciable dysphagia at this time. Recommend regular solids and thin liquids. No overt s/sx of aspiration with all textures trialed throughout evaluation. Facial symmetry observed. Pt and family educated on role/purpose of SLP, diet recommendations, reducing risk of aspiration/safe swallow strategies, swallow anatomy/function with all parties verbalizing understanding and agreement   SLP Total Evaluation Time   Eval: oral/pharyngeal swallow function, clinical swallow Minutes (18519) 15   SLP Goals   Therapy Frequency (SLP Eval) one time eval and treatment only   SLP Discharge Planning   SLP Plan DC SLP   SLP Discharge Recommendation home;home with assist   SLP Rationale for DC Rec defer DC recommendations to PT/OT   SLP Brief overview of current status  regular solids and thin liquids; patient tolerating baseline diet with no concerns; implement safe swallow strategies   SLP Time and Intention   Total Session Time (sum of timed and untimed services) 15

## 2025-07-20 NOTE — ED PROVIDER NOTES
Emergency Department Note      History of Present Illness     Chief Complaint   Generalized Weakness      HPI   Judi Moore is a 69 year old female with past medical history for type II DM, hypertension, hypercholesterolemia  who presents today with dizziness, ataxia, and right -sided weakness.  Patient presents with her daughter, along with her other daughter on the phone (who is her primary caregiver), who were able to help with the history.  Patient states that for the last 3 days that she has been feeling generally weaker.  She then notes that yesterday during the late morning she developed significant dizziness, described mostly as an ataxia.  The daughters also note that she went from being able to walk around and care for herself, to needing significant help from them to maneuver around her house.  Patient has been complaining of some left-sided weakness.  She also endorses worsening shortness of breath with exertion.  She denies any headaches, vision changes, speech changes, chest pain.    Independent Historian   Daughter as detailed above.    Review of External Notes   Reviewed emergency department note from 7/18/2025.  Patient was seen for elevated blood pressure.  At that time he had noted blood pressures in the 200s.  Per note patient reported feeling fine and denied any symptoms.  Labs of BMP and CBC were performed which were both negative.  No imaging was done at this time.  EKG was not done.  Patient was prescribed 75 mg of hydralazine in the emergency department.  He was discharged.    Reviewed MRI brain without contrast from 5/11/23-chronic lacunar infarcts within both cerebellar hemispheres noted.    Past Medical History     Medical History and Problem List   Past Medical History:   Diagnosis Date    Anemia     Cataracts, bilateral     Chronic bilateral back pain     Chronic pain     CKD (chronic kidney disease)     Dermatitis     Diabetic macular edema (H)     Diabetic neuropathy (H)      DM (diabetes mellitus) (H)     Essential hypertension     Gastroenteritis     Hypercholesterolemia     Hyperparathyroidism     Hypertension     Knee pain, right     Medial meniscus tear     Nuclear sclerosis of both eyes     Obesity (BMI 30.0-34.9)     VANESSA (obstructive sleep apnea)     Peripheral edema     Pneumonia     Posterior vitreous detachment     PPD positive     Retinopathy     Right carpal tunnel syndrome     Thrombocytopenia        Medications   acetaminophen (TYLENOL) 325 MG tablet  amLODIPine (NORVASC) 10 MG tablet  aspirin (ASA) 325 MG EC tablet  aspirin 81 MG EC tablet  atorvastatin (LIPITOR) 10 MG tablet  carvedilol (COREG) 25 MG tablet  furosemide (LASIX) 40 MG tablet  HYDROmorphone (DILAUDID) 2 MG tablet  hydrOXYzine (ATARAX) 10 MG tablet  insulin aspart (NOVOLOG FLEXPEN) 100 UNIT/ML pen  insulin glargine (BASAGLAR KWIKPEN) 100 UNIT/ML pen  losartan (COZAAR) 100 MG tablet  magnesium hydroxide (MILK OF MAGNESIA) 400 MG/5ML suspension  senna-docusate (SENOKOT-S/PERICOLACE) 8.6-50 MG tablet  vitamin D3 (VITAMIN D3) 50 mcg (2000 units) tablet        Surgical History   Past Surgical History:   Procedure Laterality Date    ABDOMEN SURGERY      appy    ARTHROPLASTY KNEE Right 1/17/2022    Procedure: RIGHT TOTAL KNEE ARTHROPLASTY;  Surgeon: Colt Ochoa MD;  Location:  OR       Physical Exam     Patient Vitals for the past 24 hrs:   BP Temp Pulse Resp SpO2   07/19/25 1917 -- -- -- -- 95 %   07/19/25 1902 (!) 167/72 -- 66 -- 96 %   07/19/25 1847 (!) 149/86 -- 72 -- 97 %   07/19/25 1842 -- 98.3  F (36.8  C) -- 18 100 %   07/19/25 1832 (!) 172/71 -- 67 -- 100 %   07/19/25 1822 (!) 165/77 -- -- -- --     Physical Exam  General: No acute distress  Head: normocephalic, atraumatic  Eyes: Conjunctiva normal  Throat: moist mucus membranes  Neck: ROM normal  Cardiovascular: regular rate, regular rhythm, no murmurs  Pulmonology: Regular rate, normal effort, no wheezing, crackles, or rales  Abdomen: Soft,  non-tender, no masses or organomegaly, Bowel sounds present  Musculoskeletal: moving upper and lower extremities symmetrically  Skin: Warm, dry  Neuro:   Neuro Exam:  Mental status  Alertness: Alert  Orientation: Oriented to self, place, and time  Language: normal naming, normal fluency, and dysarthric  Cranial nerves  CN II: acuity grossly intact and direct pupillary light response normal  CN III/IV/VI: EOMI  CN V: facial sensation intact to light touch throughout  CN VII: right-sided droop  CN VIII: finger rub normal  CN IX/X: palate & uvula symmetric  CN XI: equal shoulder shrug  CN XII: tongue midline  Motor  no drift and right effort against gravity,  strength weaker on right side.   Sensory  intact sensation to light touch distally in all 4 extremities and face  Coordination  finger-nose-finger normal  Psych: normal mood and affect        Diagnostics     Lab Results   Labs Ordered and Resulted from Time of ED Arrival to Time of ED Departure   BASIC METABOLIC PANEL - Abnormal       Result Value    Sodium 139      Potassium 3.5      Chloride 98      Carbon Dioxide (CO2) 29      Anion Gap 12      Urea Nitrogen 26.3 (*)     Creatinine 1.66 (*)     GFR Estimate 33 (*)     Calcium 9.7      Glucose 170 (*)    NT-PROBNP - Abnormal    NT-proBNP 363 (*)    ROUTINE UA WITH MICROSCOPIC REFLEX TO CULTURE - Abnormal    Color Urine Straw      Appearance Urine Clear      Glucose Urine 200 (*)     Bilirubin Urine Negative      Ketones Urine Negative      Specific Gravity Urine 1.011      Blood Urine Negative      pH Urine 7.5 (*)     Protein Albumin Urine 70 (*)     Urobilinogen Urine Normal      Nitrite Urine Negative      Leukocyte Esterase Urine Negative      Bacteria Urine Few (*)     RBC Urine 1      WBC Urine <1      Squamous Epithelials Urine 2 (*)    CBC WITH PLATELETS AND DIFFERENTIAL - Abnormal    WBC Count 6.3      RBC Count 5.68 (*)     Hemoglobin 17.0 (*)     Hematocrit 51.0 (*)     MCV 90      MCH 29.9       MCHC 33.3      RDW 12.6      Platelet Count 84 (*)     % Neutrophils 56      % Lymphocytes 29      % Monocytes 11      % Eosinophils 3      % Basophils 1      % Immature Granulocytes 0      NRBCs per 100 WBC 0      Absolute Neutrophils 3.5      Absolute Lymphocytes 1.8      Absolute Monocytes 0.7      Absolute Eosinophils 0.2      Absolute Basophils 0.0      Absolute Immature Granulocytes 0.0      Absolute NRBCs 0.0     RBC AND PLATELET MORPHOLOGY - Abnormal    RBC Morphology Confirmed RBC Indices      Platelet Assessment   (*)     Value: Automated Count Confirmed. Giant platelets are present.    Giant Platelets Slight (*)    GLUCOSE BY METER - Abnormal    GLUCOSE BY METER POCT 146 (*)    TROPONIN T, HIGH SENSITIVITY - Normal    Troponin T, High Sensitivity 10     TROPONIN T, HIGH SENSITIVITY       Imaging   CT Head w/o Contrast   Final Result   IMPRESSION:   1.  No CT evidence for acute intracranial process.   2.  Brain atrophy and presumed chronic microvascular ischemic changes as above.         CTA Head Neck with Contrast    (Results Pending)       EKG   ECG taken at 2005, ECG read at 2007  Normal sinus rhythm    Rate 66 bpm. MA interval 130 ms. QRS duration 64 ms. QT/QTc 424/444 ms. P-R-T axes 57 25 74.    Independent Interpretation   None    ED Course      Medications Administered   Medications - No data to display    Procedures   Procedures     Discussion of Management   Admitting Hospitalist, Dr. Torres  Neurosurgery, hospitals    ED Course   ED Course as of 07/20/25 0044   Sun Jul 20, 2025   0010 N-Terminal Pro Bnp(!): 363       Additional Documentation  None    Medical Decision Making / Diagnosis     CMS Diagnoses: None    MIPS   None               [facility]   Judi Moore is a 69 year old female who presents today with her family for acute onset of dizziness, weakness, and ataxia. Per family and patient this started yesterday morning. This was also associated with significant blood pressure elevation.  Upon arrival to the ED, the patient was hypertensive, but otherwise vitally stable. Her physical exam was with concern for dysarthria, mild facial droop, and weakness to the right side. As the symptoms onset was over 24 hours, patient was out of the timeframe for a stroke code, however concern for intracranial pathology was high. Proceeded with basic labs, CT/CTA of the head and neck. Stroke neurology was consulted as well, who agreed with the plan, and would follow up after imaging. CT w/o shows no acute bleed.  Unfortunately the patient's creatinine was elevated today, and we held off on the CTA at this time due to that.  Given patient's symptoms, it would be likely that the patient will need an MRI anyway to evaluate for possible stroke.  It seems reasonable for the patient to then proceed with an MRA given the creatinine functioning.  Proceeded with admission to the hospital for further evaluation and workup.    Disposition   The patient was admitted to the hospital.     Diagnosis     ICD-10-CM    1. Dizziness  R42       2. Facial droop  R29.810       3. Elevated blood pressure reading  R03.0            Discharge Medications   New Prescriptions    No medications on file         Autumn Cruz PA-C      This patient was staffed with Dr. Roula Leblanc, who saw the patient and agreed with the plan.         Autumn Cruz PA-C  07/20/25 0056

## 2025-07-20 NOTE — CONSULTS
[facility]    Hematology / Oncology Consultation     Date of Admission:  7/19/2025  Date of Consult (When I saw the patient): 07/20/25    Assessment & Plan   Judi Moore is a 69 year old female who presented with dizziness, facial droop, elevated blood pressure was admitted on 7/19/2025 with acute stroke. I was asked to see the patient for polycythemia.    Patient was in the room with her .  And they explained that she had been due to [facility] ED with blood pressure over 200 systolic.  She did not have any symptoms at that time and she was discharged home..  Soon after her discharge she developed progressive weakness, dizziness, ataxia and left-sided weakness.  She was noted to have acute to subacute left internal capsule ischemic stroke without any hemorrhage.  She had MRI of the head which did not show any critical stenosis in large vessels.  Her MRI of the neck did reveal 60 to 70% narrowing of the right ICA but otherwise was unremarkable.    She has been incidentally noted to have polycythemia. I extensively reviewed primary and secondary polycythemia which is a condition of increased red cell number and mass. This can be primary condition called as polycythemia vera or more commonly due a number of causes that lead to impaired tissue oxygenation or in setting of tumors making a lot of erythropoietin. The primary hematology condition - P. Vera is relatively rare and is treated with phlebotomies to address hyperviscosity from increased red cell mass. Hyperviscosity of blood in these patients can present as chest and abdominal pain, myalgia and weakness, fatigue, headache, blurred vision, transient loss of vision, paresthesias, slow mentation and/or a sense of depersonalization. Patients usually have near normal life expectancies.     There a number of reasons that can impair tissue oxygenation and the increased red cell is to meet the demands for increased oxygen. The  most benign of these is seen in subjects living at high altitude with low oxygen concentration. Other common ones include obstructive sleep apnea, COPD, asthma, valvular heart disease. Phlebotomy is not indicated in secondary polycythemia as the increased red cell counts is due to an increased need of red cells for oxygen exchange. In this setting we have to treat underlying condition when possible. This would need a detailed work up which can be conducted by her PCP and include - PFT, echocardiogram, sleep study. This can be done in concert with cardiology and pulmonary services.     I will get next generation sequencing done in peripheral blood for JAK2 mutation which is present in nearly 100% of polycythemia vera patients. I will check for erythropoietin levels, get peripheral smear. I have ordered for ferritin and iron panel.     Even if she had polycythemia vera the primary management is antiplatelet agent like aspirin and hydroxyurea.  Her test results from the NGS testing will take at least a couple of weeks to be available.  If she indeed has the JAK2 mutation we will start her on hydroxyurea for her polycythemia vera.  We will follow her as an outpatient.  All questions for patient and her family were answered to her satisfaction.  Her  and 2 daughters were present at the time of our visit.    Over 80 min spent on day of visit including review of tests, obtaining/reviewing separately obtained history/physical exam, counseling patient, ordering medications/tests/procedures, communicating with PCP/consultants, and documenting in electronic medical record.    George Stewart  Hematologist and Medical Oncologist  [facility]         George Stewart MD, MD    Code Status    Full Code    Reason for Consult   Reason for consult: I was asked by Dr. Sanford to evaluate this patient for polycythemia.    Primary Care Physician   Abelardo Pickard    Chief Complaint    progressive weakness, dizziness, ataxia  and left-sided weakness    History is obtained from the patient    History of Present Illness   Judi Moore is a 69 year old female who presents with  progressive weakness, dizziness, ataxia and left-sided weakness    Past Medical History   I have reviewed this patient's medical history and updated it with pertinent information if needed.   Past Medical History:   Diagnosis Date    Anemia     Cataracts, bilateral     Chronic bilateral back pain     without sciatica    Chronic pain     right knee    CKD (chronic kidney disease)     stage 3    Dermatitis     Diabetic macular edema (H)     Diabetic neuropathy (H)     DM (diabetes mellitus) (H)     Essential hypertension     Gastroenteritis     Hypercholesterolemia     Hyperparathyroidism     Hypertension     Knee pain, right     Medial meniscus tear     right knee    Nuclear sclerosis of both eyes     Obesity (BMI 30.0-34.9)     VANESSA (obstructive sleep apnea)     moderate    Peripheral edema     Pneumonia     Posterior vitreous detachment     both eyes    PPD positive     Retinopathy     Right carpal tunnel syndrome     Thrombocytopenia        Past Surgical History   I have reviewed this patient's surgical history and updated it with pertinent information if needed.  Past Surgical History:   Procedure Laterality Date    ABDOMEN SURGERY      appy    ARTHROPLASTY KNEE Right 1/17/2022    Procedure: RIGHT TOTAL KNEE ARTHROPLASTY;  Surgeon: Colt Ochoa MD;  Location:  OR       Prior to Admission Medications   Prior to Admission Medications   Prescriptions Last Dose Informant Patient Reported? Taking?   HYDROmorphone (DILAUDID) 2 MG tablet   No No   Sig: Take 1 tablet (2 mg) by mouth every 4 hours as needed for moderate to severe pain   acetaminophen (TYLENOL) 325 MG tablet   No No   Sig: Take 2 tablets (650 mg) by mouth every 4 hours as needed for other (For optimal non-opioid multimodal pain management to improve pain control.)   amLODIPine (NORVASC) 10 MG  tablet  Self Yes No   Sig: Take 10 mg by mouth daily   aspirin (ASA) 325 MG EC tablet   No No   Sig: Take 1 tablet (325 mg) by mouth daily   aspirin 81 MG EC tablet   No No   Sig: Take 1 tablet (81 mg) by mouth daily   atorvastatin (LIPITOR) 10 MG tablet  Self Yes No   Sig: Take 10 mg by mouth daily   carvedilol (COREG) 25 MG tablet  Self Yes No   Sig: Take 25 mg by mouth 2 times daily (with meals)   furosemide (LASIX) 40 MG tablet  Self Yes No   Sig: Take 40 mg by mouth daily   hydrOXYzine (ATARAX) 10 MG tablet   No No   Sig: Take 1 tablet (10 mg) by mouth every 6 hours as needed for itching or anxiety (with pain, moderate pain)   insulin aspart (NOVOLOG FLEXPEN) 100 UNIT/ML pen  Self Yes No   Sig: Inject 1-15 Units Subcutaneous 3 times daily (with meals) Inject 1-15 units under the skin three times a day with meals.   Take 1 unit per 13 gram carb before breakfast.  Take 1 unit per 20 gram carb before lunch  Take 1 unit per 7 gram carb before dinner   insulin glargine (BASAGLAR KWIKPEN) 100 UNIT/ML pen  Self Yes No   Sig: Inject 10 Units Subcutaneous At Bedtime   losartan (COZAAR) 100 MG tablet  Self Yes No   Sig: Take 100 mg by mouth daily   magnesium hydroxide (MILK OF MAGNESIA) 400 MG/5ML suspension   No No   Sig: Take 30 mLs by mouth daily as needed for constipation   senna-docusate (SENOKOT-S/PERICOLACE) 8.6-50 MG tablet   No No   Sig: Take 1-2 tablets by mouth 2 times daily Take while on oral narcotics to prevent or treat constipation.   vitamin D3 (VITAMIN D3) 50 mcg (2000 units) tablet  Self Yes No   Sig: Take 1 tablet by mouth daily       Facility-Administered Medications: None     Allergies   Allergies   Allergen Reactions    Oxycodone-Acetaminophen      lightheadedness    Tramadol      lightheadedness    Phenothiazines Rash     Pt unsure of this       Social History   I have reviewed this patient's social history and updated it with pertinent information if needed. Judi Moore  reports that she has  "never smoked. She has never used smokeless tobacco. She reports that she does not drink alcohol and does not use drugs.    Family History   I have reviewed this patient's family history and updated it with pertinent information if needed.   No family history on file.    Review of Systems   The 10 point Review of Systems is negative other than noted in the HPI or here.      Physical Exam   Temp: 97.3  F (36.3  C) Temp src: Oral BP: (!) 185/84 (large adult cuff) Pulse: 77   Resp: 16 SpO2: 96 % O2 Device: None (Room air)    Vital Signs with Ranges  Temp:  [97.3  F (36.3  C)-98.3  F (36.8  C)] 97.3  F (36.3  C)  Pulse:  [63-77] 77  Resp:  [16-18] 16  BP: (149-201)/() 185/84  SpO2:  [94 %-100 %] 96 %  0 lbs 0 oz         Data   Recent Labs   Lab Test 07/20/25  0802 07/20/25  0751 07/20/25  0314 07/19/25  2317 07/19/25  2308 01/24/22  1649 01/18/22  0804 01/18/22  0747 01/17/22  1731 01/17/22  1425     --   --   --  139 134  --  136  --  141   POTASSIUM 3.5  --   --   --  3.5 3.8  --  4.0  --  3.7   CHLORIDE 100  --   --   --  98 100  --  107  --  108   CO2 26  --   --   --  29 29  --  26  --  27   ANIONGAP 12  --   --   --  12 5  --  3  --  6   BUN 24.2*  --   --   --  26.3* 33*  --  33*  --  38*   CR 1.53*  --   --   --  1.66* 1.67*  --  1.60*  --  1.56*   * 164* 180* 146* 170* 224*   < > 188*   < > 103*   JURGEN 9.7  --   --   --  9.7 9.2  --  8.6  --  9.0    < > = values in this interval not displayed.     No results for input(s): \"MAG\", \"PHOS\" in the last 91445 hours.  Recent Labs   Lab Test 07/20/25  0802 07/19/25  2114 01/24/22  1649 01/18/22  0747   WBC 6.0 6.3 9.4  --    HGB 16.2* 17.0* 12.0 12.5   * 84* 196  --    MCV 89 90 90  --    NEUTROPHIL  --  56 69  --      No results for input(s): \"BILITOTAL\", \"ALKPHOS\", \"ALT\", \"AST\", \"ALBUMIN\", \"LDH\" in the last 44590 hours.  No results found for: \"TSH\"  No results for input(s): \"CEA\" in the last 37466 hours.  Results for orders placed or performed " during the hospital encounter of 07/19/25   CT Head w/o Contrast    Narrative    EXAM: CT HEAD W/O CONTRAST  LOCATION: [facility]  DATE: 7/19/2025    INDICATION: Left sided weakness, dizziness, onset 2 days ago  COMPARISON: None.  TECHNIQUE: Routine CT Head without IV contrast. Multiplanar reformats. Dose reduction techniques were used.    FINDINGS:  INTRACRANIAL CONTENTS: No intracranial hemorrhage, extraaxial collection, or mass effect.  No CT evidence of acute infarct. Mild presumed chronic small vessel ischemic changes. Mild generalized volume loss. No hydrocephalus.     VISUALIZED ORBITS/SINUSES/MASTOIDS: No intraorbital abnormality. Mild mucosal thickening scattered about the paranasal sinuses. No middle ear or mastoid effusion.    BONES/SOFT TISSUES: No acute abnormality.      Impression    IMPRESSION:  1.  No CT evidence for acute intracranial process.  2.  Brain atrophy and presumed chronic microvascular ischemic changes as above.     MR Brain w/o & w Contrast    Narrative    EXAM: MR BRAIN WITHOUT AND WITH CONTRAST, MRA BRAIN ([place]) WITHOUT CONTRAST, MRA NECK (CAROTIDS) WITHOUT AND WITH CONTRAST  LOCATION: [facility]  DATE: 07/20/2025    INDICATION: Dizziness, elevated blood pressure.  COMPARISON: CT head 07/19/2025.  CONTRAST: 10 mL Gadavist.  TECHNIQUE:   1) Routine multiplanar multisequence head MRI without and with intravenous contrast.  2) 3D time-of-flight head MRA without intravenous contrast.  3) Neck MRA without and with IV contrast. Stenosis measurements made according to NASCET criteria unless otherwise specified.    FINDINGS:  HEAD MRI:  INTRACRANIAL CONTENTS: Small focus of acute to early subacute ischemia in the posterior limb left internal capsule measuring 13 x 6 mm. Mild FLAIR hyperintensity. No hemorrhage. Mild volume loss and presumed chronic small vessel ischemia. No mass, acute   hemorrhage, or extra-axial fluid  collections. Normal position of the cerebellar tonsils. No pathologic contrast enhancement.    SELLA: No abnormality accounting for technique.    OSSEOUS STRUCTURES/SOFT TISSUES: Normal marrow signal. The major intracranial vascular flow-voids are maintained.     ORBITS: No abnormality accounting for technique.     SINUSES/MASTOIDS: No paranasal sinus mucosal disease. No middle ear or mastoid effusion.     HEAD MRA:   ANTERIOR CIRCULATION: No stenosis/occlusion, aneurysm, or high-flow vascular malformation. Standard [place] anatomy.    POSTERIOR CIRCULATION: No stenosis/occlusion, aneurysm, or high-flow vascular malformation. Balanced vertebral arteries supply a normal basilar artery.     NECK MRA:   RIGHT CAROTID: 60-70% narrowing right ICA just above the bifurcation by NASCET criteria.    LEFT CAROTID: Atheromatous disease without significant narrowing.    VERTEBRAL ARTERIES: No focal stenosis or dissection. Balanced vertebral arteries.    AORTIC ARCH: Classic aortic arch anatomy with no significant stenosis at the origin of the great vessels.      Impression    IMPRESSION:  HEAD MRI:   1.  Small acute to early subacute infarct posterior limb left internal capsule.    2.  Age-related and chronic ischemic changes.    HEAD MRA:   1.  Normal MRA [place].    NECK MRA:   1.  60-70% narrowing right ICA by NASCET criteria  2.  No other carotid or vertebral artery stenosis.     MRA Neck (Carotids) wo & w Contrast    Narrative    EXAM: MR BRAIN WITHOUT AND WITH CONTRAST, MRA BRAIN ([place]) WITHOUT CONTRAST, MRA NECK (CAROTIDS) WITHOUT AND WITH CONTRAST  LOCATION: [facility]  DATE: 07/20/2025    INDICATION: Dizziness, elevated blood pressure.  COMPARISON: CT head 07/19/2025.  CONTRAST: 10 mL Gadavist.  TECHNIQUE:   1) Routine multiplanar multisequence head MRI without and with intravenous contrast.  2) 3D time-of-flight head MRA without intravenous contrast.  3) Neck MRA  without and with IV contrast. Stenosis measurements made according to NASCET criteria unless otherwise specified.    FINDINGS:  HEAD MRI:  INTRACRANIAL CONTENTS: Small focus of acute to early subacute ischemia in the posterior limb left internal capsule measuring 13 x 6 mm. Mild FLAIR hyperintensity. No hemorrhage. Mild volume loss and presumed chronic small vessel ischemia. No mass, acute   hemorrhage, or extra-axial fluid collections. Normal position of the cerebellar tonsils. No pathologic contrast enhancement.    SELLA: No abnormality accounting for technique.    OSSEOUS STRUCTURES/SOFT TISSUES: Normal marrow signal. The major intracranial vascular flow-voids are maintained.     ORBITS: No abnormality accounting for technique.     SINUSES/MASTOIDS: No paranasal sinus mucosal disease. No middle ear or mastoid effusion.     HEAD MRA:   ANTERIOR CIRCULATION: No stenosis/occlusion, aneurysm, or high-flow vascular malformation. Standard [place] anatomy.    POSTERIOR CIRCULATION: No stenosis/occlusion, aneurysm, or high-flow vascular malformation. Balanced vertebral arteries supply a normal basilar artery.     NECK MRA:   RIGHT CAROTID: 60-70% narrowing right ICA just above the bifurcation by NASCET criteria.    LEFT CAROTID: Atheromatous disease without significant narrowing.    VERTEBRAL ARTERIES: No focal stenosis or dissection. Balanced vertebral arteries.    AORTIC ARCH: Classic aortic arch anatomy with no significant stenosis at the origin of the great vessels.      Impression    IMPRESSION:  HEAD MRI:   1.  Small acute to early subacute infarct posterior limb left internal capsule.    2.  Age-related and chronic ischemic changes.    HEAD MRA:   1.  Normal MRA [place].    NECK MRA:   1.  60-70% narrowing right ICA by NASCET criteria  2.  No other carotid or vertebral artery stenosis.     MRA Brain ([place]) wo Contrast    Narrative    EXAM: MR BRAIN WITHOUT AND WITH CONTRAST, MRA BRAIN  ([place]) WITHOUT CONTRAST, MRA NECK (CAROTIDS) WITHOUT AND WITH CONTRAST  LOCATION: [facility]  DATE: 07/20/2025    INDICATION: Dizziness, elevated blood pressure.  COMPARISON: CT head 07/19/2025.  CONTRAST: 10 mL Gadavist.  TECHNIQUE:   1) Routine multiplanar multisequence head MRI without and with intravenous contrast.  2) 3D time-of-flight head MRA without intravenous contrast.  3) Neck MRA without and with IV contrast. Stenosis measurements made according to NASCET criteria unless otherwise specified.    FINDINGS:  HEAD MRI:  INTRACRANIAL CONTENTS: Small focus of acute to early subacute ischemia in the posterior limb left internal capsule measuring 13 x 6 mm. Mild FLAIR hyperintensity. No hemorrhage. Mild volume loss and presumed chronic small vessel ischemia. No mass, acute   hemorrhage, or extra-axial fluid collections. Normal position of the cerebellar tonsils. No pathologic contrast enhancement.    SELLA: No abnormality accounting for technique.    OSSEOUS STRUCTURES/SOFT TISSUES: Normal marrow signal. The major intracranial vascular flow-voids are maintained.     ORBITS: No abnormality accounting for technique.     SINUSES/MASTOIDS: No paranasal sinus mucosal disease. No middle ear or mastoid effusion.     HEAD MRA:   ANTERIOR CIRCULATION: No stenosis/occlusion, aneurysm, or high-flow vascular malformation. Standard [place] anatomy.    POSTERIOR CIRCULATION: No stenosis/occlusion, aneurysm, or high-flow vascular malformation. Balanced vertebral arteries supply a normal basilar artery.     NECK MRA:   RIGHT CAROTID: 60-70% narrowing right ICA just above the bifurcation by NASCET criteria.    LEFT CAROTID: Atheromatous disease without significant narrowing.    VERTEBRAL ARTERIES: No focal stenosis or dissection. Balanced vertebral arteries.    AORTIC ARCH: Classic aortic arch anatomy with no significant stenosis at the origin of the great vessels.      Impression     IMPRESSION:  HEAD MRI:   1.  Small acute to early subacute infarct posterior limb left internal capsule.    2.  Age-related and chronic ischemic changes.    HEAD MRA:   1.  Normal MRA [place].    NECK MRA:   1.  60-70% narrowing right ICA by NASCET criteria  2.  No other carotid or vertebral artery stenosis.

## 2025-07-20 NOTE — ED PROVIDER NOTES
ED APC SUPERVISION NOTE:   I evaluated this patient in conjunction with Autumn Cruz PA-C  I have participated in the care of the patient and personally performed key elements of the history, exam, and medical decision making.      HPI:   Judi Moore is a 69 year old female with history of hypertension who comes with elevated blood pressure and concern of difficulty in walking and disequilibrium.  Patient and patient's daughter states speech is at baseline.  No vision changes.  She does see a retina specialist.  She has been taking her home blood pressure medications as she has not missed doses.  Started on the previous day in the morning.  Not on any blood pressure medications    Independent Historian:   Daughter as detailed above.    Review of External Notes: Blood pressure medications as seen below:         Reviewed the emergency visit note to [facility] [place] [place] on the previous day.  Patient's blood pressure reading at that time arrival was 136/59.  Denied any symptoms at that time.  Blood sugar was 266.  Physical examination was reassuring.  Returns today is normal.  Was discharged.    EXAM:   General: Well-nourished  Eyes: PERRL, conjunctivae pink no scleral icterus or conjunctival injection  ENT:  Moist mucus membranes, posterior oropharynx clear without erythema or exudates  Respiratory:  Lungs clear to auscultation bilaterally, no crackles/rubs/wheezes.  Good air movement  CV: Normal rate and rhythm, no murmurs/rubs/gallops  GI:  Abdomen soft and non-distended.  Normoactive BS.  No tenderness, guarding or rebound  Skin: Warm, dry.  No rashes or petechiae  Musculoskeletal: No peripheral edema or calf tenderness  Neuro: Alert and oriented to person/place/time.  Patient has an accent but seems to have some dysarthria on my examination.  Question of nasolabial fold flattening on the right at rest.  Question mild decrease strength in right upper extremity right lower extremity compared to the left.  Seems  to have some dysmetria with finger-nose-finger on the right compared to the left.  Psychiatric: Normal affect      Independent Interpretation (X-rays, CTs, rhythm strip):  CT Head: No intracranial hemorrhage.    Consultations/Discussion of Management or Tests:  Neurology, fellow     MIPS:      None    MEDICAL DECISION MAKING/ASSESSMENT AND PLAN:   Judi Moore is a 69 year old female who comes with complaint of hypertension despite taking her home blood pressure medications.  Blood pressure is elevated here.  Migrated concern is that family states that she is had balance issues and on my exam she seems to have some mildly slurred speech and some right sided subtle weakness.  Head CT was obtained and shows no intracranial hemorrhage.  There was significant delay in obtaining blood work and IV access due to difficult access issues.  Her labs are at her baseline stage IIIb chronic kidney disease.  Blood sugar is not significantly elevated.  Troponin is negative.  Lungs are clear and she does not have signs of overt congestive heart failure.  White blood cell count is otherwise normal.  Hemoglobin is slightly elevated at 17 and her platelets are slightly low at 84.  Urinalysis is bland without signs of infection.  I did consult with stroke neurology who reports that she does have a history of chronic lacunar infarcts within both cerebellar hemispheres seen on MRI of the brain in May 2023 and this may be recrudescence but they do recommend MRI.  CTA was initially ordered given renal insufficiency will proceed with MRA of the head and neck instead.  Patient will be admitted to observation for ongoing MRA MRI, neurology consultation and further monitoring and treatment.  Blood pressure will be allowed to run permissively high at this time.  Dr. Barnes graciously agreed to admit the patient.        DIAGNOSIS:     ICD-10-CM    1. Dizziness  R42       2. Facial droop  R29.810       3. Elevated blood pressure reading   R03.0             Roula Leblanc MD  7/19/2025  [facility] EMERGENCY DEPT         Roula Leblanc MD  07/20/25 0122

## 2025-07-20 NOTE — CONSULTS
[facility]    Stroke Telephone Note    I was called by Roula Leblanc on 07/19/25 regarding patient Judi Moore. The patient is a 69 year old female with past medical history of hypertension, type 2 diabetes, chronic lacunar infarcts in bilateral cerebellum on MRI in 2023 presented to [facility] ER complaining of generalized weakness, dizziness and feeling off balance.  History is obtained from ER provider who endorsed that the patient symptoms started on the morning of 7/18/2025 and as per patient's daughter's description she is able to walk around independently but needed assistance since yesterday due to feeling off balance.  ER providers noticed a facial droop (left versus right) and decreased  strength in the right upper extremity.    Vitals  BP: (!) 167/72   Pulse: 66   Resp: 18   Temp: 98.3  F (36.8  C)        Imaging Findings  CT head: No acute intracranial pathology  CTA head/neck: Unable to obtain due to patient's kidney function    Impression  #Generalized weakness (right sided weakness for ER physician and left-sided weakness for ER physician assistant)  #Dizziness and feeling off balance  69-year-old female with past medical history of hypertension, type 2 diabetes, lacunar infarction bilateral cerebellum on MRI in 2023 who presented to [facility] ER complaining of generalized weakness, dizziness, and feeling off balance.  CT scan of the head did not show any acute intracranial pathology.  As the patient's kidney function is poor with a GFR of 33 the decision was taken to not get a CT angiogram.  Hence we would recommend getting an MRI of the brain and MR angiogram of the head and neck stat to rule out an acute ischemic infarct or vessel disease that could explain the patient's symptoms.  Patient is not a candidate for tenecteplase or thrombectomy as her symptoms are almost 2 days old.  If MRI brain shows stroke then we will give further recommendations for workup and  "management.  If MRI brain is negative no further recommendations from stroke neurology standpoint.  Kindly call us with any questions/concerns.    Recommendations  -MRI brain with and without contrast and MR angiogram of head and neck.  - Further recommendations pending MRI results.  - Kindly call us with any questions/concerns    The Stroke Staff is Dr. Sallie MD.    ABILIO Griffiths  Vascular Neurology Fellow    To page me or covering stroke neurology team member, click here: AMCOM  Choose \"On Call\" tab at top, then select \"NEUROLOGY/ALL SITES\" from middle drop-down box, press Enter, then look for \"stroke\" or \"telestroke\" for your site.      Addendum:  MRI brain completed showing acute to subacute left IC infarct.  Patient already on aspirin 81 mg at home we will recommend loading the patient with 300 mg of Plavix.  And admitting the patient for stroke workup.  Further recommendations as listed below.      Recommendations  - Use orderset: \"Ischemic Stroke Routine Admission\" or \"Ischemic Stroke No Thrombolytics/No Thrombectomy ICU Admission\"  - Place Neurology IP Stroke Consult order   - Neurochecks and Vital Signs every 4 hours  - Blood pressure goal less than 180 mmHg  - Daily aspirin 81 mg for secondary stroke prevention  - Plavix (clopidogrel) 300 mg PO loading dose x 1  - Plavix (clopidogrel) 75 mg PO Daily  - Statin: Continue PTA atorvastatin 10 mg.  Will titrate once LDL levels back  - Telemetry, EKG  - TTE  - Bedside Glucose Monitoring  - A1c, Lipid Panel, Troponin x 3  - PT/OT/SLP  - Stroke Education  - Euthermia, Euglycemia  - Inpatient stroke team will see patient formally in the a.m. and give further recommendations as needed.  Kindly call us with any questions/concerns          "

## 2025-07-20 NOTE — ED NOTES
[facility]  ED Nurse Handoff Report    ED Chief complaint: Generalized Weakness      ED Diagnosis:   Final diagnoses:   Dizziness   Facial droop   Elevated blood pressure reading       Code Status: Full Code    Allergies:   Allergies   Allergen Reactions    Oxycodone-Acetaminophen      lightheadedness    Tramadol      lightheadedness    Phenothiazines Rash     Pt unsure of this       Patient Story: 69 year old female with past medical history for type II DM, hypertension, hypercholesterolemia  who presents today with dizziness, ataxia, and right -sided weakness.  Patient presents with her daughter, along with her other daughter on the phone (who is her primary caregiver), who were able to help with the history.  Patient states that for the last 3 days that she has been feeling generally weaker.  She then notes that yesterday during the late morning she developed significant dizziness, described mostly as an ataxia.  The daughters also note that she went from being able to walk around and care for herself, to needing significant help from them to maneuver around her house.  Patient has been complaining of some left-sided weakness.  She also endorses worsening shortness of breath with exertion.    Focused Assessment:  Dizziness, weakness, sob    Treatments and/or interventions provided: Labs, IV, assisted to commode  Patient's response to treatments and/or interventions:   Results for orders placed or performed during the hospital encounter of 07/19/25   CT Head w/o Contrast     Status: None    Narrative    EXAM: CT HEAD W/O CONTRAST  LOCATION: [facility]  DATE: 7/19/2025    INDICATION: Left sided weakness, dizziness, onset 2 days ago  COMPARISON: None.  TECHNIQUE: Routine CT Head without IV contrast. Multiplanar reformats. Dose reduction techniques were used.    FINDINGS:  INTRACRANIAL CONTENTS: No intracranial hemorrhage, extraaxial collection, or mass effect.  No CT  evidence of acute infarct. Mild presumed chronic small vessel ischemic changes. Mild generalized volume loss. No hydrocephalus.     VISUALIZED ORBITS/SINUSES/MASTOIDS: No intraorbital abnormality. Mild mucosal thickening scattered about the paranasal sinuses. No middle ear or mastoid effusion.    BONES/SOFT TISSUES: No acute abnormality.      Impression    IMPRESSION:  1.  No CT evidence for acute intracranial process.  2.  Brain atrophy and presumed chronic microvascular ischemic changes as above.     Basic metabolic panel     Status: Abnormal   Result Value Ref Range    Sodium 139 135 - 145 mmol/L    Potassium 3.5 3.4 - 5.3 mmol/L    Chloride 98 98 - 107 mmol/L    Carbon Dioxide (CO2) 29 22 - 29 mmol/L    Anion Gap 12 7 - 15 mmol/L    Urea Nitrogen 26.3 (H) 8.0 - 23.0 mg/dL    Creatinine 1.66 (H) 0.51 - 0.95 mg/dL    GFR Estimate 33 (L) >60 mL/min/1.73m2    Calcium 9.7 8.8 - 10.4 mg/dL    Glucose 170 (H) 70 - 99 mg/dL   Troponin T, High Sensitivity     Status: Normal   Result Value Ref Range    Troponin T, High Sensitivity 10 <=14 ng/L   NT-proBNP     Status: Abnormal   Result Value Ref Range    NT-proBNP 363 (H) 0 - 353 pg/mL   UA with Microscopic reflex to Culture     Status: Abnormal    Specimen: Urine, Midstream   Result Value Ref Range    Color Urine Straw Colorless, Straw, Light Yellow, Yellow    Appearance Urine Clear Clear    Glucose Urine 200 (A) Negative mg/dL    Bilirubin Urine Negative Negative    Ketones Urine Negative Negative mg/dL    Specific Gravity Urine 1.011 1.003 - 1.035    Blood Urine Negative Negative    pH Urine 7.5 (H) 5.0 - 7.0    Protein Albumin Urine 70 (A) Negative mg/dL    Urobilinogen Urine Normal Normal mg/dL    Nitrite Urine Negative Negative    Leukocyte Esterase Urine Negative Negative    Bacteria Urine Few (A) None Seen /HPF    RBC Urine 1 <=2 /HPF    WBC Urine <1 <=5 /HPF    Squamous Epithelials Urine 2 (H) <=1 /HPF    Narrative    Urine Culture not indicated   [place] Draw  *Canceled*     Status: None ()    Narrative    The following orders were created for panel order [place] Draw.  Procedure                               Abnormality         Status                     ---------                               -----------         ------                       Please view results for these tests on the individual orders.   CBC with platelets and differential     Status: Abnormal   Result Value Ref Range    WBC Count 6.3 4.0 - 11.0 10e3/uL    RBC Count 5.68 (H) 3.80 - 5.20 10e6/uL    Hemoglobin 17.0 (H) 11.7 - 15.7 g/dL    Hematocrit 51.0 (H) 35.0 - 47.0 %    MCV 90 78 - 100 fL    MCH 29.9 26.5 - 33.0 pg    MCHC 33.3 31.5 - 36.5 g/dL    RDW 12.6 10.0 - 15.0 %    Platelet Count 84 (L) 150 - 450 10e3/uL    % Neutrophils 56 %    % Lymphocytes 29 %    % Monocytes 11 %    % Eosinophils 3 %    % Basophils 1 %    % Immature Granulocytes 0 %    NRBCs per 100 WBC 0 <1 /100    Absolute Neutrophils 3.5 1.6 - 8.3 10e3/uL    Absolute Lymphocytes 1.8 0.8 - 5.3 10e3/uL    Absolute Monocytes 0.7 0.0 - 1.3 10e3/uL    Absolute Eosinophils 0.2 0.0 - 0.7 10e3/uL    Absolute Basophils 0.0 0.0 - 0.2 10e3/uL    Absolute Immature Granulocytes 0.0 <=0.4 10e3/uL    Absolute NRBCs 0.0 10e3/uL   RBC and Platelet Morphology     Status: Abnormal   Result Value Ref Range    RBC Morphology Confirmed RBC Indices     Platelet Assessment (A) Automated Count Confirmed. Platelet morphology is normal.     Automated Count Confirmed. Giant platelets are present.    Giant Platelets Slight (A) None Seen   Extra Tube     Status: None    Narrative    The following orders were created for panel order Extra Tube.  Procedure                               Abnormality         Status                     ---------                               -----------         ------                     Extra Green Top (Lithiu...[[id number]]                      Final result               Extra Green Top (Lithiu...[[id number]]                      Final  result                 Please view results for these tests on the individual orders.   Extra Green Top (Lithium Heparin) Tube     Status: None   Result Value Ref Range    Hold Specimen JIC    Extra Green Top (Lithium Heparin) Tube     Status: None   Result Value Ref Range    Hold Specimen JIC    Glucose by meter     Status: Abnormal   Result Value Ref Range    GLUCOSE BY METER POCT 146 (H) 70 - 99 mg/dL   Troponin T, High Sensitivity     Status: Normal   Result Value Ref Range    Troponin T, High Sensitivity 10 <=14 ng/L   Hemoglobin A1c     Status: Abnormal   Result Value Ref Range    Estimated Average Glucose 174 (H) <117 mg/dL    Hemoglobin A1C 7.7 (H) <5.7 %   Extra Tube     Status: None    Narrative    The following orders were created for panel order Extra Tube.  Procedure                               Abnormality         Status                     ---------                               -----------         ------                     Extra Green Top (Lithiu...[[id number]]                      Final result               Extra Green Top (Lithiu...[[id number]]                      Final result                 Please view results for these tests on the individual orders.   Extra Green Top (Lithium Heparin) Tube     Status: None   Result Value Ref Range    Hold Specimen JIC    Extra Green Top (Lithium Heparin) Tube     Status: None   Result Value Ref Range    Hold Specimen JIC    EKG 12-lead, tracing only     Status: None   Result Value Ref Range    Systolic Blood Pressure  mmHg    Diastolic Blood Pressure  mmHg    Ventricular Rate 66 BPM    Atrial Rate 66 BPM    AL Interval 130 ms    QRS Duration 64 ms     ms    QTc 444 ms    P Axis 57 degrees    R AXIS 25 degrees    T Axis 74 degrees    Interpretation ECG       Sinus rhythm  Normal ECG  No previous ECGs available  Confirmed by GENERATED REPORT, COMPUTER ([id number]),  Leeanne Cast ([id number]) on 7/19/2025 10:47:39 PM     CBC with platelets differential      Status: Abnormal    Narrative    The following orders were created for panel order CBC with platelets differential.  Procedure                               Abnormality         Status                     ---------                               -----------         ------                     CBC with platelets and ...[[id number]]  Abnormal            Final result               RBC and Platelet Morpho...[[id number]]  Abnormal            Final result                 Please view results for these tests on the individual orders.       To be done/followed up on inpatient unit:  IP orders    Does this patient have any cognitive concerns?: Dizziness    Activity level - Baseline/Home:  Stand with Assist  Activity Level - Current:   Stand with assist x2    Patient's Preferred language: English   Needed?: No    Isolation: None  Infection: Not Applicable  Sepsis treatment initiated: No  Patient tested for COVID 19 prior to admission: NO  Bariatric?: No    Vital Signs:   Vitals:    07/19/25 2000 07/19/25 2145 07/19/25 2200 07/19/25 2215   BP: (!) 174/101 (!) 178/142 (!) 201/104    Pulse: 74 63 64    Resp:       Temp:       SpO2:  94% 95% 99%       Cardiac Rhythm:     Was the PSS-3 completed:   Yes  What interventions are required if any?               Family Comments: Dtr here  OBS brochure/video discussed/provided to patient/family: N/A              Name of person given brochure if not patient:               Relationship to patient:     For the majority of the shift this patient's behavior was Green.   Behavioral interventions performed were       ED NURSE PHONE NUMBER: [phone]

## 2025-07-20 NOTE — PROVIDER NOTIFICATION
Stroke neurology to determine BP goals.   Dr. Nay Sanford  Nursing to confirm that prn BP meds goals are consistent with neurology recommendations for BP GOAL

## 2025-07-20 NOTE — PROGRESS NOTES
RECEIVING UNIT ED HANDOFF REVIEW    ED Nurse Handoff Report was reviewed by: Marge Stoddard RN on July 20, 2025 at 2:55 AM

## 2025-07-20 NOTE — PLAN OF CARE
Goal Outcome Evaluation:           Overall Patient Progress: improvingOverall Patient Progress: improving       Reason for Admission: Dizziness, R sided weakness.     Cognitive/Mentation: A/Ox 4  Neuros/CMS: RUE and RLE slightly weaker than LUE and LLE. Generalized weakness. Slight R facial droop. R  slightly weaker than L.   VS: VSS on RA ex HTN, within parameters. SBP <220.    Tele: NSR.  /GI: Continent. Uses commode at bedside. Last BM PTA.   Pulmonary: LS clear.  Pain: Chronic lower back and neck pain.     Drains/Lines: R PIV, SL.   Skin: Intact.  Activity: Assist x 2 with GB.  Diet: Regular with thin liquids.     Therapies recs: Pending.  Discharge: Pending.    Aggression Stoplight Tool: Green    End of shift summary: Received patient from the ED. MRI and MRA completed.    Admission/Transfer from: ED  2 RN skin assessment completed. Yes, Althea AVINA RN.  Significant findings include: none  WOC Nurse Consult Ordered? No

## 2025-07-20 NOTE — H&P
[facility]  Hospitalist History and Physical  Date of Admission:  7/19/2025    Primary Care Physician   Abelardo Pickard    Chief Complaint  Generalized Weakness    History obtained from the patient and the medical chart and her daughter    History of Present Illness   Judi Moore is a 69 year old female with a past medical history of type 2 diabetes, hypertension, dyslipidemia, sleep apnea, CKD stage IV presents to the hospital with left-sided weakness and ataxia.  Patient reports that her symptoms started 1 day prior to presentation.  She reports having difficulty ambulating.  Additionally the patient has been having elevated blood pressure readings at home with systolics in the 200s.  She presented to [facility] on July 18 for her hypertension but at that time did not have any weakness.  She was treated with a dose of hydralazine and was discharged home.  She reports that she has been compliant with her medications however her blood pressure has remained elevated.  She provides no other complaints    Past Medical History    I have reviewed this patient's medical history and updated it with pertinent information if needed.   Past Medical History:   Diagnosis Date    Anemia     Cataracts, bilateral     Chronic bilateral back pain     without sciatica    Chronic pain     right knee    CKD (chronic kidney disease)     stage 3    Dermatitis     Diabetic macular edema (H)     Diabetic neuropathy (H)     DM (diabetes mellitus) (H)     Essential hypertension     Gastroenteritis     Hypercholesterolemia     Hyperparathyroidism     Hypertension     Knee pain, right     Medial meniscus tear     right knee    Nuclear sclerosis of both eyes     Obesity (BMI 30.0-34.9)     VANESSA (obstructive sleep apnea)     moderate    Peripheral edema     Pneumonia     Posterior vitreous detachment     both eyes    PPD positive     Retinopathy     Right carpal tunnel syndrome     Thrombocytopenia         Past Surgical History   I have reviewed this patient's surgical history and updated it with pertinent information if needed.  Past Surgical History:   Procedure Laterality Date    ABDOMEN SURGERY      appy    ARTHROPLASTY KNEE Right 1/17/2022    Procedure: RIGHT TOTAL KNEE ARTHROPLASTY;  Surgeon: Colt Ochoa MD;  Location:  OR       Allergies   Allergies   Allergen Reactions    Oxycodone-Acetaminophen      lightheadedness    Tramadol      lightheadedness    Phenothiazines Rash     Pt unsure of this       Social History   I have reviewed this patient's social history and updated it with pertinent information if needed. Judi Moore  reports that she has never smoked. She has never used smokeless tobacco. She reports that she does not drink alcohol and does not use drugs.    Family History   I have reviewed this patient's family history and updated it with pertinent information if needed.   No family history on file.    Physical Exam   Temp: 98.3  F (36.8  C)   BP: (!) 167/72 Pulse: 66   Resp: 18 SpO2: 95 %      Vital Signs with Ranges  Temp:  [98.3  F (36.8  C)] 98.3  F (36.8  C)  Pulse:  [66-72] 66  Resp:  [18] 18  BP: (149-172)/(71-86) 167/72  SpO2:  [95 %-100 %] 95 %  0 lbs 0 oz  Physical Exam  Vitals reviewed.   Constitutional:       Appearance: Normal appearance.      Comments: Patient seen resting bed comfortably in no apparent distress in the emergency room.  She is accompanied by her daughter who is bedside.   Eyes:      Extraocular Movements: Extraocular movements intact.      Conjunctiva/sclera: Conjunctivae normal.      Pupils: Pupils are equal, round, and reactive to light.   Cardiovascular:      Rate and Rhythm: Normal rate and regular rhythm.   Pulmonary:      Effort: Pulmonary effort is normal.      Breath sounds: Normal breath sounds.   Abdominal:      General: Abdomen is flat. Bowel sounds are normal.      Palpations: Abdomen is soft.   Musculoskeletal:         General: No  swelling.   Neurological:      Mental Status: She is alert and oriented to person, place, and time.      Comments: Left lower facial weakness on smiling.  Left upper extremity weakness compared to the right.  No lower extremity weakness noted.  Finger-nose intact.         Assessment & Plan   Judi Moore is a 69 year old female with a past medical history of type 2 diabetes, hypertension, dyslipidemia, sleep apnea, CKD stage IV presents to the hospital with left-sided weakness and ataxia.    Left sided Weakness and ataxia  Patient presents with left-sided weakness and difficulty ambulating.  Has left lower facial weakness and left upper extremity weakness on my physical exam.  CT scan of the head negative for acute pathology.  Due to her kidney function the patient did not want a CTA.  Her case was discussed with stroke neurology, will obtain MRI imaging.  If MRI is positive for a stroke will Iressa stroke workup.  -Follow-up MRI brain, MRA head and neck  -Neurochecks every 4  -Follow-up stroke neuroconsult  - Monitor telemetry    Addendum: MRI returned positive for a acute to subacute infarct of the left internal capsule. Permissive htn, echo, pt, ot ordered. Started plavix per neurology recommendations.     Polycythemia  Has been present on multiple labs in the last three months.  I am not seeing any mention of these laboratory findings and her chart from previous visits.  She is already on aspirin for primary prevention which we will continue.  -Continue pta aspirin  -Follow-up hematology consult    Type 2 diabetes with long-term insulin use  A1c was 7.7% in June 2025  Insulin sliding scale  Hypoglycemia protocol  PT insulin once med rec is complete    Hypertension  Dyslipidemia  PTA hydralazine, Lasix, Coreg, atorvastatin, aspirin    Sleep apnea  Does not tolerate CPAP    CKD stage IV  Secondary hyperparathyroidism  Chronic and stable.  Follows with nephrology    DVT ppx: SCDs  Code Status: Code  Medically  Ready for Discharge: Anticipated in 2-4 Days    Medical Decision Making       65 MINUTES SPENT BY ME on the date of service doing chart review, history, exam, documentation & further activities per the note.      Crystal Barnes MD  Hospitalist Medicine Service  Pager# [phone]

## 2025-07-20 NOTE — PHARMACY-ADMISSION MEDICATION HISTORY
Pharmacy Intern Admission Medication History    Admission medication history is complete. The information provided in this note is only as accurate as the sources available at the time of the update.    Information Source(s): Patient, Clinic records, and CareEverywhere/SureScripts via in-person    Pertinent Information: Confirmed with patient her medication list is accurate and up to date. She is no longer taking amlodipine and has switched to losartan. Additionally she is now only taking a baby aspirin instead of a full strength dose. She has been taking hydralazine 25 mg with hydralazine 50 mg TID. Lastly, she is taking carvedilol 12.5 mg with carvedilol 25 mg BID. She is no longer taking Dilaudid, Atarax, Milk of Magnesia, or senna-docusate.     Changes made to PTA medication list:  Added: Carvedilol 12.5 mg, hydralazine 25 mg + 50 mg TID  Deleted: Senna-docusate, Milk of Magnesia, hydroxyzine, Dilaudid, amlodipine, aspirin 325 mg  Changed: Updated carvedilol to total dosage of 32.5 mg BID    Allergies reviewed with patient and updates made in EHR: yes    Medication History Completed By: Danyelle Panchal 7/20/2025 11:51 AM    PTA Med List   Medication Sig Last Dose/Taking    acetaminophen (TYLENOL) 325 MG tablet Take 2 tablets (650 mg) by mouth every 4 hours as needed for other (For optimal non-opioid multimodal pain management to improve pain control.) Past Week    aspirin 81 MG EC tablet Take 1 tablet (81 mg) by mouth daily 7/19/2025    atorvastatin (LIPITOR) 10 MG tablet Take 10 mg by mouth daily 7/19/2025    carvedilol (COREG) 12.5 MG tablet Take 12.5 mg by mouth 2 times daily (with meals). Take with carvedilol 25 mg BID for a total of 37.5 mg BID. 7/19/2025    carvedilol (COREG) 25 MG tablet Take 25 mg by mouth 2 times daily (with meals) 7/19/2025    furosemide (LASIX) 40 MG tablet Take 40 mg by mouth daily 7/19/2025    hydrALAZINE (APRESOLINE) 25 MG tablet Take 25 mg by mouth 3 times daily. 7/19/2025     hydrALAZINE (APRESOLINE) 50 MG tablet Take 50 mg by mouth 3 times daily. Take with hydralazine 25 mg TID for a total of 75 mg TID. 7/19/2025    insulin aspart (NOVOLOG FLEXPEN) 100 UNIT/ML pen Inject 1-15 Units Subcutaneous 3 times daily (with meals) Inject 1-15 units under the skin three times a day with meals.   Take 1 unit per 13 gram carb before breakfast.  Take 1 unit per 20 gram carb before lunch  Take 1 unit per 7 gram carb before dinner 7/19/2025    insulin glargine (BASAGLAR KWIKPEN) 100 UNIT/ML pen Inject 10 Units Subcutaneous At Bedtime 7/19/2025    losartan (COZAAR) 100 MG tablet Take 100 mg by mouth daily 7/19/2025    vitamin D3 (VITAMIN D3) 50 mcg (2000 units) tablet Take 1 tablet by mouth daily  7/19/2025

## 2025-07-20 NOTE — PROGRESS NOTES
07/20/25 1500   Appointment Info   Signing Clinician's Name / Credentials (PT) Nay Barker, ARNULFO   Quick Adds   Quick Adds Vestibular Eval   Living Environment   People in Home spouse   Current Living Arrangements house   Home Accessibility stairs to enter home;stairs within home   Number of Stairs, Main Entrance 4   Stair Railings, Main Entrance none   Number of Stairs, Within Home, Primary eight   Stair Railings, Within Home, Primary railings safe and in good condition   Transportation Anticipated family or friend will provide   Living Environment Comments Pt lives in house with 4 JOSE LUIS with . Laundry in basement. Walk in shower and tub.   Self-Care   Usual Activity Tolerance moderate   Current Activity Tolerance fair   Regular Exercise No   Equipment Currently Used at Home cane, straight;shower chair   Fall history within last six months no   Activity/Exercise/Self-Care Comment INd with dressing, meds, showering.  drives to appointments. Dtr is visitng from [place].   General Information   Onset of Illness/Injury or Date of Surgery 07/19/25   Referring Physician Crystal Barnes MD   Patient/Family Therapy Goals Statement (PT) go home   Pertinent History of Current Problem (include personal factors and/or comorbidities that impact the POC) Judi Moore is a 69 year old female with a past medical history of type 2 diabetes, hypertension, dyslipidemia, sleep apnea, CKD stage IV presents to the hospital with left-sided weakness and ataxia.   Existing Precautions/Restrictions fall   Cognition   Affect/Mental Status (Cognition) WFL   Orientation Status (Cognition) oriented x 4   Follows Commands (Cognition) WFL   Pain Assessment   Patient Currently in Pain Yes, see Vital Sign flowsheet  (Neck pain)   Integumentary/Edema   Integumentary/Edema Comments 1+ non pitting edema in BLE   Posture    Posture Forward head position   Posture Comments L head tilt   Range of Motion (ROM)   Range of Motion  ROM is WFL   ROM Comment R lateral side bending of neck limited   Strength (Manual Muscle Testing)   Strength (Manual Muscle Testing) Deficits observed during functional mobility   Strength Comments 4/5 in all extremeties, globally deconditioned   Bed Mobility   Comment, (Bed Mobility) NT   Transfers   Comment, (Transfers) ModA w/ FWW, progressed to Ramila   Gait/Stairs (Locomotion)   Comment, (Gait/Stairs) Ramila FWW, forward flexed posture with steady gait speed. Heavy reliance on FWW, unsafe to attempt no AD   Balance   Balance Comments Fearful of falling, defer formal balance   Sensory Examination   Sensory Perception patient reports no sensory changes   Cervicogenic Screen   Neck ROM limited side bedning to R, pt has L head tilt   Clinical Impression   Criteria for Skilled Therapeutic Intervention Yes, treatment indicated   PT Diagnosis (PT) impaired gait   Influenced by the following impairments weakness, deconditioning   Functional limitations due to impairments fall risk   Clinical Presentation (PT Evaluation Complexity) stable   Clinical Presentation Rationale PMH   Clinical Decision Making (Complexity) moderate complexity   Planned Therapy Interventions (PT) balance training;bed mobility training;gait training;home exercise program;neuromuscular re-education;patient/family education;ROM (range of motion);stair training;strengthening;stretching;transfer training   Risk & Benefits of therapy have been explained evaluation/treatment results reviewed;care plan/treatment goals reviewed;risks/benefits reviewed;current/potential barriers reviewed;participants voiced agreement with care plan;participants included;patient   PT Total Evaluation Time   PT Eval, Moderate Complexity Minutes (88488) 10   Physical Therapy Goals   PT Frequency 5x/week   PT Predicted Duration/Target Date for Goal Attainment 07/27/25   PT Goals Bed Mobility;Transfers;Gait;Stairs   PT: Bed Mobility Modified independent;Supine to/from  "sit;Rolling;Bridging   PT: Transfers Modified independent;Sit to/from stand;Bed to/from chair;Assistive device   PT: Gait Modified independent;Assistive device;100 feet   PT: Stairs Modified independent;4 stairs  (No railings)   Interventions   Interventions Quick Adds Gait Training;Therapeutic Activity   Therapeutic Activity   Therapeutic Activities: dynamic activities to improve functional performance Minutes (98133) 4   Treatment Detail/Skilled Intervention Ramila transfers to and from recliner w/ FWW. Cues to push from chair versus pull on walker. MD arrived so deferred LE HEP   Gait Training   Gait Training Minutes (28064) 13   Symptoms Noted During/After Treatment (Gait Training) fatigue   Treatment Detail/Skilled Intervention Gait training: FWW Ramila 100+100ft, cues for fall posture throughout session, pt has left head tilt and runs into objects on R side. Unclear if this was baseline, per patient \" I wasn't look at the right\". Denies dizziness with mobility. Demo's stairs with step to pattern leading with RLE, pt completed x4 with B rails and modA, significant effort and heavy use of ralings. Unsafe to attempt no rails.   Distance in Feet 200   [place] Level (Gait Training) minimum assist (75% patient effort)   Physical Assistance Level (Gait Training) 1 person assist   Impairments (Gait Analysis/Training) balance impaired;strength decreased   PT Discharge Planning   PT Plan Trial dynamic gait tasks such as head turns, review stairs, try with no railings. Initiate LE HEP   PT Discharge Recommendation (DC Rec) Transitional Care Facility   PT Rationale for DC Rec Pt below baseline IND no AD, currenlty Ramila w/ FWW, modA on stairs.  unable to provide this level of assist so rec TCU to progress strenght, balance and mobility. Family is thinking about how they could get Ramila at home and HC but are mostly open to TCU.   PT Brief overview of current status Ramila FWW  (Goals of therapy will be to address safe " mobility and make recs for d/c to next level of care. Pt and RN will continue to follow all falls risk precautions as documented by RN staff while hospitalized)   PT Total Distance Amb During Session (feet) 200

## 2025-07-21 ENCOUNTER — APPOINTMENT (OUTPATIENT)
Dept: OCCUPATIONAL THERAPY | Facility: CLINIC | Age: 70
DRG: 065 | End: 2025-07-21
Attending: HOSPITALIST
Payer: COMMERCIAL

## 2025-07-21 ENCOUNTER — APPOINTMENT (OUTPATIENT)
Dept: CARDIOLOGY | Facility: CLINIC | Age: 70
DRG: 065 | End: 2025-07-21
Attending: HOSPITALIST
Payer: COMMERCIAL

## 2025-07-21 ENCOUNTER — APPOINTMENT (OUTPATIENT)
Dept: PHYSICAL THERAPY | Facility: CLINIC | Age: 70
DRG: 065 | End: 2025-07-21
Payer: COMMERCIAL

## 2025-07-21 LAB
ALBUMIN SERPL BCG-MCNC: 3.6 G/DL (ref 3.5–5.2)
ALP SERPL-CCNC: 138 U/L (ref 40–150)
ALT SERPL W P-5'-P-CCNC: 14 U/L (ref 0–50)
ANION GAP SERPL CALCULATED.3IONS-SCNC: 12 MMOL/L (ref 7–15)
ANION GAP SERPL CALCULATED.3IONS-SCNC: 18 MMOL/L (ref 7–15)
AST SERPL W P-5'-P-CCNC: 30 U/L (ref 0–45)
BASOPHILS # BLD AUTO: 0 10E3/UL (ref 0–0.2)
BASOPHILS NFR BLD AUTO: 0 %
BILIRUB DIRECT SERPL-MCNC: <0.08 MG/DL (ref 0–0.3)
BILIRUB SERPL-MCNC: 0.3 MG/DL
BUN SERPL-MCNC: 35.1 MG/DL (ref 8–23)
BUN SERPL-MCNC: 38.6 MG/DL (ref 8–23)
CALCIUM SERPL-MCNC: 9.4 MG/DL (ref 8.8–10.4)
CALCIUM SERPL-MCNC: 9.5 MG/DL (ref 8.8–10.4)
CHLORIDE SERPL-SCNC: 100 MMOL/L (ref 98–107)
CHLORIDE SERPL-SCNC: 100 MMOL/L (ref 98–107)
CREAT SERPL-MCNC: 1.66 MG/DL (ref 0.51–0.95)
CREAT SERPL-MCNC: 1.85 MG/DL (ref 0.51–0.95)
EGFRCR SERPLBLD CKD-EPI 2021: 29 ML/MIN/1.73M2
EGFRCR SERPLBLD CKD-EPI 2021: 33 ML/MIN/1.73M2
EOSINOPHIL # BLD AUTO: 0.3 10E3/UL (ref 0–0.7)
EOSINOPHIL NFR BLD AUTO: 4 %
EPO SERPL-ACNC: 10 MU/ML
ERYTHROCYTE [DISTWIDTH] IN BLOOD BY AUTOMATED COUNT: 12.6 % (ref 10–15)
FERRITIN SERPL-MCNC: 247 NG/ML (ref 11–328)
GLUCOSE BLDC GLUCOMTR-MCNC: 299 MG/DL (ref 70–99)
GLUCOSE BLDC GLUCOMTR-MCNC: 311 MG/DL (ref 70–99)
GLUCOSE BLDC GLUCOMTR-MCNC: 313 MG/DL (ref 70–99)
GLUCOSE BLDC GLUCOMTR-MCNC: 342 MG/DL (ref 70–99)
GLUCOSE BLDC GLUCOMTR-MCNC: 342 MG/DL (ref 70–99)
GLUCOSE BLDC GLUCOMTR-MCNC: 378 MG/DL (ref 70–99)
GLUCOSE SERPL-MCNC: 335 MG/DL (ref 70–99)
GLUCOSE SERPL-MCNC: 396 MG/DL (ref 70–99)
HCO3 SERPL-SCNC: 20 MMOL/L (ref 22–29)
HCO3 SERPL-SCNC: 23 MMOL/L (ref 22–29)
HCT VFR BLD AUTO: 47.5 % (ref 35–47)
HGB BLD-MCNC: 15.8 G/DL (ref 11.7–15.7)
IMM GRANULOCYTES # BLD: 0 10E3/UL
IMM GRANULOCYTES NFR BLD: 0 %
INTERPRETATION SERPL IEP-IMP: NORMAL
IRON BINDING CAPACITY (ROCHE): 208 UG/DL (ref 240–430)
IRON SATN MFR SERPL: 30 % (ref 15–46)
IRON SERPL-MCNC: 63 UG/DL (ref 37–145)
LAB TEST RESULTS REPORTED IN RPTPERIOD: NORMAL
LIPASE SERPL-CCNC: 19 U/L (ref 13–60)
LVEF ECHO: NORMAL
LYMPHOCYTES # BLD AUTO: 1.7 10E3/UL (ref 0.8–5.3)
LYMPHOCYTES NFR BLD AUTO: 29 %
MCH RBC QN AUTO: 29.7 PG (ref 26.5–33)
MCHC RBC AUTO-ENTMCNC: 33.3 G/DL (ref 31.5–36.5)
MCV RBC AUTO: 89 FL (ref 78–100)
MONOCYTES # BLD AUTO: 0.7 10E3/UL (ref 0–1.3)
MONOCYTES NFR BLD AUTO: 12 %
NEUTROPHILS # BLD AUTO: 3.3 10E3/UL (ref 1.6–8.3)
NEUTROPHILS NFR BLD AUTO: 55 %
NRBC # BLD AUTO: 0 10E3/UL
NRBC BLD AUTO-RTO: 0 /100
PATH REPORT.COMMENTS IMP SPEC: NORMAL
PATH REPORT.FINAL DX SPEC: NORMAL
PATH REPORT.MICROSCOPIC SPEC OTHER STN: NORMAL
PATH REPORT.MICROSCOPIC SPEC OTHER STN: NORMAL
PLATELET # BLD AUTO: 124 10E3/UL (ref 150–450)
POTASSIUM SERPL-SCNC: 4 MMOL/L (ref 3.4–5.3)
POTASSIUM SERPL-SCNC: 4.3 MMOL/L (ref 3.4–5.3)
PROT SERPL-MCNC: 7.5 G/DL (ref 6.4–8.3)
RBC # BLD AUTO: 5.32 10E6/UL (ref 3.8–5.2)
RETICS # AUTO: 0.13 10E6/UL (ref 0.03–0.1)
RETICS/RBC NFR AUTO: 2.5 % (ref 0.5–2)
SEQUENCING METHOD PNL BLD/T: NORMAL
SODIUM SERPL-SCNC: 135 MMOL/L (ref 135–145)
SODIUM SERPL-SCNC: 138 MMOL/L (ref 135–145)
SPECIMEN TYPE: NORMAL
WBC # BLD AUTO: 6 10E3/UL (ref 4–11)

## 2025-07-21 PROCEDURE — 82040 ASSAY OF SERUM ALBUMIN: CPT | Performed by: INTERNAL MEDICINE

## 2025-07-21 PROCEDURE — 81339 MPL GENE SEQ ALYS EXON 10: CPT | Performed by: INTERNAL MEDICINE

## 2025-07-21 PROCEDURE — 81279 JAK2 GENE TRGT SEQUENCE ALYS: CPT | Performed by: INTERNAL MEDICINE

## 2025-07-21 PROCEDURE — 250N000013 HC RX MED GY IP 250 OP 250 PS 637: Performed by: NURSE PRACTITIONER

## 2025-07-21 PROCEDURE — 36415 COLL VENOUS BLD VENIPUNCTURE: CPT | Performed by: INTERNAL MEDICINE

## 2025-07-21 PROCEDURE — 85045 AUTOMATED RETICULOCYTE COUNT: CPT

## 2025-07-21 PROCEDURE — 250N000011 HC RX IP 250 OP 636: Performed by: INTERNAL MEDICINE

## 2025-07-21 PROCEDURE — 82374 ASSAY BLOOD CARBON DIOXIDE: CPT | Performed by: INTERNAL MEDICINE

## 2025-07-21 PROCEDURE — 85025 COMPLETE CBC W/AUTO DIFF WBC: CPT

## 2025-07-21 PROCEDURE — 97116 GAIT TRAINING THERAPY: CPT | Mod: GP

## 2025-07-21 PROCEDURE — 83690 ASSAY OF LIPASE: CPT | Performed by: INTERNAL MEDICINE

## 2025-07-21 PROCEDURE — 99233 SBSQ HOSP IP/OBS HIGH 50: CPT

## 2025-07-21 PROCEDURE — 82728 ASSAY OF FERRITIN: CPT | Performed by: INTERNAL MEDICINE

## 2025-07-21 PROCEDURE — 97530 THERAPEUTIC ACTIVITIES: CPT | Mod: GP

## 2025-07-21 PROCEDURE — 250N000013 HC RX MED GY IP 250 OP 250 PS 637: Performed by: HOSPITALIST

## 2025-07-21 PROCEDURE — 99233 SBSQ HOSP IP/OBS HIGH 50: CPT | Performed by: INTERNAL MEDICINE

## 2025-07-21 PROCEDURE — 80051 ELECTROLYTE PANEL: CPT | Performed by: INTERNAL MEDICINE

## 2025-07-21 PROCEDURE — 120N000001 HC R&B MED SURG/OB

## 2025-07-21 PROCEDURE — 85060 BLOOD SMEAR INTERPRETATION: CPT | Performed by: PATHOLOGY

## 2025-07-21 PROCEDURE — 250N000013 HC RX MED GY IP 250 OP 250 PS 637: Performed by: INTERNAL MEDICINE

## 2025-07-21 PROCEDURE — 97112 NEUROMUSCULAR REEDUCATION: CPT | Mod: GO

## 2025-07-21 PROCEDURE — 255N000002 HC RX 255 OP 636: Performed by: HOSPITALIST

## 2025-07-21 PROCEDURE — 97166 OT EVAL MOD COMPLEX 45 MIN: CPT | Mod: GO

## 2025-07-21 PROCEDURE — 93306 TTE W/DOPPLER COMPLETE: CPT | Mod: 26 | Performed by: INTERNAL MEDICINE

## 2025-07-21 PROCEDURE — 999N000208 ECHOCARDIOGRAM COMPLETE

## 2025-07-21 RX ORDER — NICOTINE POLACRILEX 4 MG
15-30 LOZENGE BUCCAL
Status: DISCONTINUED | OUTPATIENT
Start: 2025-07-21 | End: 2025-07-23 | Stop reason: HOSPADM

## 2025-07-21 RX ORDER — ENOXAPARIN SODIUM 100 MG/ML
40 INJECTION SUBCUTANEOUS EVERY 24 HOURS
Status: DISCONTINUED | OUTPATIENT
Start: 2025-07-21 | End: 2025-07-23 | Stop reason: HOSPADM

## 2025-07-21 RX ORDER — HYDRALAZINE HYDROCHLORIDE 25 MG/1
25 TABLET, FILM COATED ORAL 3 TIMES DAILY
Status: DISCONTINUED | OUTPATIENT
Start: 2025-07-21 | End: 2025-07-21

## 2025-07-21 RX ORDER — CARVEDILOL 12.5 MG/1
12.5 TABLET ORAL 2 TIMES DAILY WITH MEALS
Status: CANCELLED | OUTPATIENT
Start: 2025-07-21

## 2025-07-21 RX ORDER — CARVEDILOL 12.5 MG/1
12.5 TABLET ORAL 2 TIMES DAILY WITH MEALS
Status: DISCONTINUED | OUTPATIENT
Start: 2025-07-22 | End: 2025-07-23

## 2025-07-21 RX ORDER — CARVEDILOL 12.5 MG/1
12.5 TABLET ORAL 2 TIMES DAILY WITH MEALS
Status: DISCONTINUED | OUTPATIENT
Start: 2025-07-21 | End: 2025-07-21

## 2025-07-21 RX ORDER — DEXTROSE MONOHYDRATE 25 G/50ML
25-50 INJECTION, SOLUTION INTRAVENOUS
Status: DISCONTINUED | OUTPATIENT
Start: 2025-07-21 | End: 2025-07-23 | Stop reason: HOSPADM

## 2025-07-21 RX ORDER — CARVEDILOL 25 MG/1
25 TABLET ORAL 2 TIMES DAILY WITH MEALS
Status: DISCONTINUED | OUTPATIENT
Start: 2025-07-21 | End: 2025-07-21

## 2025-07-21 RX ADMIN — ATORVASTATIN CALCIUM 20 MG: 20 TABLET, FILM COATED ORAL at 20:25

## 2025-07-21 RX ADMIN — CLOPIDOGREL BISULFATE 75 MG: 75 TABLET, FILM COATED ORAL at 08:07

## 2025-07-21 RX ADMIN — ASPIRIN 81 MG CHEWABLE TABLET 81 MG: 81 TABLET CHEWABLE at 08:08

## 2025-07-21 RX ADMIN — CARVEDILOL 12.5 MG: 12.5 TABLET, FILM COATED ORAL at 16:16

## 2025-07-21 RX ADMIN — PERFLUTREN 2 ML (DILUTED): 6.52 INJECTION, SUSPENSION INTRAVENOUS at 15:45

## 2025-07-21 RX ADMIN — ENOXAPARIN SODIUM 40 MG: 40 INJECTION SUBCUTANEOUS at 16:41

## 2025-07-21 ASSESSMENT — ACTIVITIES OF DAILY LIVING (ADL)
ADLS_ACUITY_SCORE: 60
DEPENDENT_IADLS:: INDEPENDENT;TRANSPORTATION
ADLS_ACUITY_SCORE: 60
ADLS_ACUITY_SCORE: 59
ADLS_ACUITY_SCORE: 58
ADLS_ACUITY_SCORE: 60

## 2025-07-21 NOTE — PROGRESS NOTES
[facility]    Vascular Neurology Progress Note    Interval History     -167  Afebrile     Today, Judi reports she has left lower extremity weakness. At baseline, she reports using a cane in the kitchen. She reports prior right knee replacement in 2019.  She denies any right-sided weakness or numbness.  Judi states that she was on PTA amlodipine, and home SBP ranged in the 140s.  She reports that she has not missed any medications and would like to speak with the primary team about BP medications.  Judi denies any new weakness, numbness, headache, vision or speech changes.    Hospital Course     Chief complaint: Generalized Weakness    Judi Moore is a 69 year old female with past medical history of Polycythemia, HTN, DM, prior cerebellar stroke (MRI 2023, image unavailable for personal review and patient also not aware of this finding) who presented to ED yesterday with difficulty walking independently, dizziness and noted to have weak RUE per ED MD exam. CT head showed no acute intracranial pathology. Stroke team was consulted after MRI brain show acute to subacute left internal capsule ischemic stroke. MRA head/neck with no large vessel occlusion, 50 to 60%, right ICA stenosis.     Assessment and Plan   Acute to subacute left internal capsule ischemic stroke, etiology likely small vessel disease     History of prior cerebellar stroke (MRI 2023, image unavailable for personal review)    Recommendations   - Neurochecks and Vital Signs every 4 hours  -  SBP goal <130/80 long-term  - Continue daily PTA aspirin 81 mg for secondary stroke prevention  - Continue Plavix (clopidogrel) 75 mg PO Daily for 3 weeks, and continue aspirin 81 mg daily  - Okay to initiate DVT prophylaxis  - Statin: Continue Lipitor 20 mg today for LDL goal of 40-70  - Encouraged Mediterranean diet and daily exercise  - Telemetry, EKG  - Bedside Glucose Monitoring  - PT/OT/SLP  - Stroke Education  -  "Euthermia, Euglycemia    Patient Follow-up    - in the next 1-2 week(s) with PCP  - in 6-8 weeks with any stroke RAY ([phone]) (ordered)    No further stroke evaluation is recommended, so we will sign off. Please contact us with any additional questions.    Pamela Edward PA-C  Vascular Neurology    To page me or covering stroke neurology team member, click here: [facility]  Choose \"On Call\" tab at top, then select \"NEUROLOGY/ALL SITES\" from middle drop-down box, press Enter, then look for \"stroke\" or \"telestroke\" for your site.    Physical Examination     Temp: 97.7  F (36.5  C) Temp src: Oral BP: 133/89 Pulse: 82   Resp: 18 SpO2: 95 % O2 Device: None (Room air)      General Exam  General:  patient lying in bed without any acute distress    HEENT:  normocephalic/atraumatic  Pulmonary:  no respiratory distress    Neuro Exam  Mental Status:  alert, oriented to age month and self, follows commands, speech slightly dysarthric and fluent, naming and repetition normal  Cranial Nerves:  visual fields intact, EOMI with normal smooth pursuit, facial sensation intact and symmetric, slight right facial droop, hearing not formally tested but intact to conversation, tongue protrusion midline  Motor:  no abnormal movements, strength 4/5 in left lower extremity, able to move all limbs spontaneously, no pronator drift   Reflexes:  not tested  Sensory:  light touch sensation intact and symmetric throughout upper and lower extremities, no extinction on double simultaneous stimulation   Coordination:  normal finger-to-nose and heel-to-shin bilaterally without dysmetria  Station/Gait:  deferred    Stroke Scales       NIHSS  1a. Level of Consciousness 0-->Alert, keenly responsive   1b. LOC Questions 0-->Answers both questions correctly   1c. LOC Commands 0-->Performs both tasks correctly   2.   Best Gaze 0-->Normal   3.   Visual 0-->No visual loss   4.   Facial Palsy 1-->Minor paralysis (flattened nasolabial fold, asymmetry on " smiling)   5a. Motor Arm, Left 0-->No drift, limb holds 90 (or 45) degrees for full 10 secs   5b. Motor Arm, Right 0-->No drift, limb holds 90 (or 45) degrees for full 10 secs   6a. Motor Leg, Left 0-->No drift, leg holds 30 degree position for full 5 secs   6b. Motor Leg, right 0-->No drift, leg holds 30 degree position for full 5 secs   7.   Limb Ataxia 0-->Absent   8.   Sensory 0-->Normal, no sensory loss   9.   Best Language 0-->No aphasia, normal   10. Dysarthria 1-->Mild-to-moderate dysarthria, patient slurs at least some words and, at worst, can be understood with some difficulty   11. Extinction and Inattention  0-->No abnormality   Total 2 (07/21/25 1105)       Imaging/Labs   (Bolded imaging and labs new and/or personally reviewed or re-reviewed by me today)    MRI/Head CT CT head: No acute intracranial pathology     MRI of the brain: Acute to subacute left internal capsule ischemic stroke.  No hemorrhage   Intracranial Vasculature MRA head: No large vessel occlusion no critical stenosis.    Cervical Vasculature MRA neck: 60-70% narrowing right ICA; otherwise no critical stenosis.      Echocardiogram Technically difficult study. EF 55 to 60%, bilateral ventricle and atria size and function.  Mild to moderate LVH.   EKG/Telemetry Sinus rhythm   Normal ECG    Cardiac CTA N/A     LDL  7/19/2025: 80 mg/dL   A1C  7/19/2025: 7.7 %   Troponin 7/20/2025: 10 ng/L     Other labs reviewed by me today:  NA    Time Spent on this Encounter   Billing: I have personally spent a total of 60 minutes providing care today, time spent in reviewing medical records and devising the plan as recorded above.

## 2025-07-21 NOTE — PROGRESS NOTES
07/21/25 1500   Appointment Info   Signing Clinician's Name / Credentials (OT) Shawna Rosenberg, OTR/L   Living Environment   People in Home spouse   Current Living Arrangements house   Home Accessibility stairs to enter home;stairs within home   Number of Stairs, Main Entrance 4   Stair Railings, Main Entrance none   Number of Stairs, Within Home, Primary eight   Stair Railings, Within Home, Primary railings safe and in good condition   Transportation Anticipated family or friend will provide   Living Environment Comments Pt lives in house w/ 4 JOSE LUIS with . Laundry in basement, walk in shower and tub. Of note: pt lives w/ spouse however spouse has been dealing with his own 'illnesses' per pt and daughter. Daughter was only here to visit as pts spouse has been sick and was set to return home on tuesday however is now going to stay through the weekend.   Self-Care   Usual Activity Tolerance good   Current Activity Tolerance fair   Regular Exercise No   Equipment Currently Used at Home cane, straight;shower chair   Fall history within last six months no   Activity/Exercise/Self-Care Comment IND w/ dressing, bathing.   Instrumental Activities of Daily Living (IADL)   IADL Comments IND w/ medications both pt and spouse use pill boxes, pt does most of the cooking (daughter reports that pt could be in the kitchen all day long, she enjoys cooking);  they order groceries from [employer],  would typically drive however he hasn't been driving recently so they take [employer] and spouse will manage ordering the car via the jamari. There is one daughter local however she has her own children as well.   General Information   Onset of Illness/Injury or Date of Surgery 07/19/25   Referring Physician Crystal Barnes MD   Patient/Family Therapy Goal Statement (OT) get stronger   Additional Occupational Profile Info/Pertinent History of Current Problem Judi Moore is a 69 year old female with a past medical history of  type 2 diabetes, hypertension, dyslipidemia, sleep apnea, CKD stage IV presents to the hospital with left-sided weakness and ataxia.  Patient reports that her symptoms started 1 day prior to presentation.  She reports having difficulty ambulating.  Additionally the patient has been having elevated blood pressure readings at home with systolics in the 200s.  She presented to [facility] on July 18 for her hypertension but at that time did not have any weakness.  She was treated with a dose of hydralazine and was discharged home.  She reports that she has been compliant with her medications however her blood pressure has remained elevated.   Existing Precautions/Restrictions fall   General Observations and Info pt is intermittently difficulty to understand however does not need , just has strong accent   Cognitive Status Examination   Orientation Status orientation to person, place and time   Follows Commands WFL;75-90% accuracy   Attention Deficit minimal deficit;focused/sustained attention  (easily distracted, tangential)   Cognitive Status Comments No overt cognitive deficits identified at this time   Visual Perception   Visual Impairment/Limitations WFL;other (see comments)  (pt reports she was supposed to have an eye appt this week as she has a hard time w/ her retina per pt report)   Sensory   Sensory Comments slight decreased sensation in R hand, appears to have difficulty self correcting proprioceptive input while grasping sock, losing  multiple times   Range of Motion Comprehensive   Comment, General Range of Motion BUE WFL   Strength Comprehensive (MMT)   Comment, General Manual Muscle Testing (MMT) Assessment RUE 4/5 w/ slight drift, RLE 4/5; R side breaks against resistance   Coordination   Upper Extremity Coordination Right UE impaired   Gross Motor Coordination RUE impaired   Fine Motor Coordination RUE impaired   Functional Limitations Decreased speed;Fine motor ADL performance  impaired;Impaired ability to perform bilateral tasks;Object transport impaired;Reach to targets impaired   Coordination Comments difficulty w/ alternating movements   Bed Mobility   Comment (Bed Mobility) Min A w/ VC for body awareness of orientation in bed (diagonal)   Transfers   Transfer Comments Min Ax1 w/ FWW   Balance   Balance Comments impaired, RLE fatigues and feels heavy w/ use of FWW this date   Activities of Daily Living   BADL Assessment/Intervention toileting;lower body dressing;grooming   Lower Body Dressing Assessment/Training   [place] Level (Lower Body Dressing) moderate assist (50% patient effort)   Grooming Assessment/Training   [place] Level (Grooming) minimum assist (75% patient effort)   Toileting   [place] Level (Toileting) minimum assist (75% patient effort)   Clinical Impression   Criteria for Skilled Therapeutic Interventions Met (OT) Yes, treatment indicated   OT Diagnosis Impaired ADL/IADL IND   OT Problem List-Impairments impacting ADL problems related to;activity tolerance impaired;balance;mobility;coordination;strength   Assessment of Occupational Performance 3-5 Performance Deficits   Identified Performance Deficits dressing, bathing, functional mobility, transfers, activity tolerance, coordination   Planned Therapy Interventions (OT) cognition;ADL retraining;IADL retraining;balance training;fine motor coordination training;neuromuscular re-education;strengthening;home program guidelines;progressive activity/exercise   Clinical Decision Making Complexity (OT) detailed assessment/moderate complexity   Risk & Benefits of therapy have been explained evaluation/treatment results reviewed;care plan/treatment goals reviewed;risks/benefits reviewed;current/potential barriers reviewed;participants voiced agreement with care plan;participants included;patient;daughter   OT Total Evaluation Time   OT Eval, Moderate Complexity Minutes (20028) 10   OT Goals   Therapy Frequency  (OT) Daily   OT Predicted Duration/Target Date for Goal Attainment 07/31/25   OT Goals Hygiene/Grooming;Lower Body Dressing;Toilet Transfer/Toileting;Home Management;OT Goal 1   OT: Hygiene/Grooming supervision/stand-by assist;while standing   OT: Lower Body Dressing Modified independent   OT: Toilet Transfer/Toileting Modified independent;cleaning and garment management;toilet transfer   OT: Home Management Modified independent;with light demand household tasks;ambulatory level   OT: Goal 1 Pt will demonstrate improved functional use of RUE by partcipating in NMR HEP.   Interventions   Interventions Quick Adds Neuromuscular Re-education   Neuromuscular Re-Education   Neuromuscular Re-Education Minutes (81494) 17   Treatment Detail/Skilled Intervention OT: CC in room prior to session. Returned to room, pt distracted and tangential sitting in chair, Min A-CGA STS to FWW, good safety awareness, stand pivot to EOB, sitting EOB w/ increased time pt able to doff socks and Mod A to don bilaterally; sitting EOB pt engaged in alternating UE motions for improving neuro plasticity and therapy; education provided to pt and daughter on session ended early 2/2 Echo tech entering toom to perform echo.   OT Discharge Planning   OT Plan RUE NMR, standing g/h in bathroom, show reacher/sock aide   OT Discharge Recommendation (DC Rec) [facility] patient will benefit from intensive, interdisciplinary therapy.  Anticipate will be able to tolerate 3 hours of therapy per day   OT Rationale for DC Rec Pt functioning well below baseline, demonstrating weakness, incoordination, strength deficits impacting ADL/IADL IND. Pt would benefit from intensive therapy in ARU setting prior to returning to home. Supportive family, spouse has been having his own medical issues as well.   OT Brief overview of current status Goals of therapy will be to address safe mobility and make recs for d/c to next level of care. Pt and RN will  continue to follow all falls risk precautions as documented by RN staff while hospitalized  (Ax1 FWW)   OT Total Distance Amb During Session (feet) 5   Total Session Time   Timed Code Treatment Minutes 17   Total Session Time (sum of timed and untimed services) 27

## 2025-07-21 NOTE — PLAN OF CARE
Pt here with L internal capsule ischemic stroke. A&O x4. Neuros intact except 4/5 RUE 4/5 RLE, R pronator drift. VSS on room air. Tele NSR. Regular diet, thin liquids. Takes pills whole. Up with AO1-2 w GB/walker. Denies pain. Pt scoring green on the Aggression Stop Light Tool. Plan is TCU vs home w assist. Discharge pending.

## 2025-07-21 NOTE — PLAN OF CARE
Goal Outcome Evaluation:  A and O x4. VSS. Tele reading NSR. NIH = 3 for RUE drift, slight R droop and occasional slurred words. No aphasia, anomalies on NIH appear to be related to cultural and language differences. Baseline reduced vision. Pain in neck, taking prn Tylenol. Good appetite on regular diet, thin liquids, pills whole. Up with 1, belt, walker. Family at bedside. Scoring green on aggression screening tool. Plan is for Echo and further stroke work up tomorrow.

## 2025-07-21 NOTE — PROGRESS NOTES
[facility]    Medicine Progress Note - Hospitalist Service    Date of Admission:  7/19/2025  Interval History   Patient is comfortable. Her daughter is present at the bedside. Emphasized that her mother is very active.  She reported that when she had the onset of symptoms she was having a hard time getting up out of the chair almost like her mind would not tell her legs what to do.  Patient could not really define whether her left leg was weaker but just had a hard time getting up.  At baseline her right knee does slow her down some but the daughter states that she is in the kitchen all day cooking at baseline and reportedly quite active.  She was seen by the stroke team.  Her blood pressure slightly up but discussion about permissive hypertension.  Cholesterol up may increase her statin.  Plans to start on plaque    Assessment & Plan   Judi Moore is a 69 year old female with a past medical history of type 2 diabetes, hypertension, dyslipidemia, sleep apnea, CKD stage IV presents to the hospital with left-sided weakness and ataxia.  Patient reports that her symptoms started 1 day prior to presentation.  She reports having difficulty ambulating.  Additionally the patient has been having elevated blood pressure readings at home with systolics in the 200s.  She presented to [facility] on July 18 for her hypertension but at that time did not have any weakness.  She was treated with a dose of hydralazine and was discharged home.  She reports that she has been compliant with her medications however her blood pressure has remained elevated.  She provides no other complaints      Judi Moore is a 69 year old female with a past medical history of type 2 diabetes, hypertension, dyslipidemia, sleep apnea, CKD stage IV presents to the hospital with left-sided weakness and ataxia.     Left facial Weakness and ataxia    Patient presents with left-sided weakness and difficulty  ambulating.   Left sided facial droop   CT scan of the head negative for acute pathology.  Due to her kidney function the patient did not want a CTA.  Her case was discussed with stroke neurology  7/20 MRI showing small acute to early subacute infarct posterior limb left internal capsule.  Chronic ischemic changes.  Head MRA normal [place]  Neck MRA 60 to 70% narrowing right ICA  7/19 stroke neurology assessment.  Started on DAPT with aspirin 81 and Plavix 300 mg loading.  Follow-up stroke team seen with patient on rounding.  Plans for PT OT speech  Permissive hypertension  May increase statin for LDL  Echo and telemetry  PT patient has a left head tilt and runs an object on the right side. (States she was not looking at the right) >> recommending TCU as below baseline.  Reporting 4-5 in all extremities with global deconditioning  Neurology team noting right sided weakness more on examination     Polycythemia  Has been present on multiple labs in the last three months.  I am not seeing any mention of these laboratory findings and her chart from previous visits.  She is already on aspirin for primary prevention which we will continue.  -Continue pta aspirin  -Follow-up hematology consult  7/20 appreciate input from hematology regarding her polycythemia and any potential contribution to stroke     Type 2 diabetes with long-term insulin use  A1c was 7.7% in June 2025  Insulin sliding scale  Hypoglycemia protocol  PT insulin once med rec is complete  Diabetic diet with sliding scale coverage     Hypertension  Dyslipidemia  PTA hydralazine, Lasix, Coreg, atorvastatin, aspirin  Resumed on none of her home blood pressure meds currently to allow for permissive hypertension  7/20 will leave goal of blood pressure to stroke team >> has as needed's but this will need to be based on stroke recommendation     Sleep apnea  Does not tolerate CPAP     CKD stage IV  Secondary hyperparathyroidism  Chronic and stable.   Follows with nephrology      Diet: Combination Diet Regular Diet Adult    DVT Prophylaxis: Pneumatic Compression Devices: Confirm DVT prevention 7/20 with stroke team  Daniel Catheter: Not present  Lines: None     Cardiac Monitoring: ACTIVE order. Indication: Stroke, acute (48 hours)  Code Status: Full Code      Clinically Significant Risk Factors Present on Admission                 # Drug Induced Platelet Defect: home medication list includes an antiplatelet medication   # Hypertension: Home medication list includes antihypertensive(s)          # DMII: A1C = 7.7 % (Ref range: <5.7 %) within past 6 months              Social Drivers of Health            Disposition Plan     Medically Ready for Discharge: Anticipated Tomorrow       JEWEL ORO MD  Hospitalist Service  [facility]  Securely message with [employer] (more info)  Text page via [employer] Paging/Directory   ______________________________________________________________________      Physical Exam   Vital Signs: Temp: 97.9  F (36.6  C) Temp src: Oral BP: (!) 163/79 Pulse: 73   Resp: 16 SpO2: 96 % O2 Device: None (Room air)    Weight: 0 lbs 0 oz    General Appearance:  Patient is comfortable sitting in the bed.   Respiratory: clear to auscultation bilaterally without wheezes or rhonchi  Cardiovascular: Regular rate and rhythm without gallop rub normal S1-S2  GI: Positive bowel sounds soft rebound guarding tenderness  Skin: No overt edema  ?  Left facial droop    Medical Decision Making       45 MINUTES SPENT BY ME on the date of service doing chart review, history, exam, documentation & further activities per the note.      Data     I have personally reviewed the following data over the past 24 hrs:    6.0  \   16.2 (H)   / 146 (L)     138 100 24.2 (H) /  373 (H)   3.5 26 1.53 (H) \     Trop: 10 BNP: 363 (H)     TSH: N/A T4: N/A A1C: 7.7 (H)       Imaging results reviewed over the past 24 hrs:       Recent Results (from the past 24 hours)    CT Head w/o Contrast     Narrative     EXAM: CT HEAD W/O CONTRAST  LOCATION: [facility]  DATE: 7/19/2025     INDICATION: Left sided weakness, dizziness, onset 2 days ago  COMPARISON: None.  TECHNIQUE: Routine CT Head without IV contrast. Multiplanar reformats. Dose reduction techniques were used.     FINDINGS:  INTRACRANIAL CONTENTS: No intracranial hemorrhage, extraaxial collection, or mass effect.  No CT evidence of acute infarct. Mild presumed chronic small vessel ischemic changes. Mild generalized volume loss. No hydrocephalus.      VISUALIZED ORBITS/SINUSES/MASTOIDS: No intraorbital abnormality. Mild mucosal thickening scattered about the paranasal sinuses. No middle ear or mastoid effusion.     BONES/SOFT TISSUES: No acute abnormality.        Impression     IMPRESSION:  1.  No CT evidence for acute intracranial process.  2.  Brain atrophy and presumed chronic microvascular ischemic changes as above.      MR Brain w/o & w Contrast     Narrative     EXAM: MR BRAIN WITHOUT AND WITH CONTRAST, MRA BRAIN ([place]) WITHOUT CONTRAST, MRA NECK (CAROTIDS) WITHOUT AND WITH CONTRAST  LOCATION: [facility]  DATE: 07/20/2025     INDICATION: Dizziness, elevated blood pressure.  COMPARISON: CT head 07/19/2025.  CONTRAST: 10 mL Gadavist.  TECHNIQUE:   1) Routine multiplanar multisequence head MRI without and with intravenous contrast.  2) 3D time-of-flight head MRA without intravenous contrast.  3) Neck MRA without and with IV contrast. Stenosis measurements made according to NASCET criteria unless otherwise specified.     FINDINGS:  HEAD MRI:  INTRACRANIAL CONTENTS: Small focus of acute to early subacute ischemia in the posterior limb left internal capsule measuring 13 x 6 mm. Mild FLAIR hyperintensity. No hemorrhage. Mild volume loss and presumed chronic small vessel ischemia. No mass, acute   hemorrhage, or extra-axial fluid collections. Normal position of the  cerebellar tonsils. No pathologic contrast enhancement.     SELLA: No abnormality accounting for technique.     OSSEOUS STRUCTURES/SOFT TISSUES: Normal marrow signal. The major intracranial vascular flow-voids are maintained.      ORBITS: No abnormality accounting for technique.      SINUSES/MASTOIDS: No paranasal sinus mucosal disease. No middle ear or mastoid effusion.      HEAD MRA:   ANTERIOR CIRCULATION: No stenosis/occlusion, aneurysm, or high-flow vascular malformation. Standard [place] anatomy.     POSTERIOR CIRCULATION: No stenosis/occlusion, aneurysm, or high-flow vascular malformation. Balanced vertebral arteries supply a normal basilar artery.      NECK MRA:   RIGHT CAROTID: 60-70% narrowing right ICA just above the bifurcation by NASCET criteria.     LEFT CAROTID: Atheromatous disease without significant narrowing.     VERTEBRAL ARTERIES: No focal stenosis or dissection. Balanced vertebral arteries.     AORTIC ARCH: Classic aortic arch anatomy with no significant stenosis at the origin of the great vessels.        Impression     IMPRESSION:  HEAD MRI:   1.  Small acute to early subacute infarct posterior limb left internal capsule.     2.  Age-related and chronic ischemic changes.     HEAD MRA:   1.  Normal MRA [place].     NECK MRA:   1.  60-70% narrowing right ICA by NASCET criteria  2.  No other carotid or vertebral artery stenosis.      MRA Neck (Carotids) wo & w Contrast     Narrative     EXAM: MR BRAIN WITHOUT AND WITH CONTRAST, MRA BRAIN ([place]) WITHOUT CONTRAST, MRA NECK (CAROTIDS) WITHOUT AND WITH CONTRAST  LOCATION: [facility]  DATE: 07/20/2025     INDICATION: Dizziness, elevated blood pressure.  COMPARISON: CT head 07/19/2025.  CONTRAST: 10 mL Gadavist.  TECHNIQUE:   1) Routine multiplanar multisequence head MRI without and with intravenous contrast.  2) 3D time-of-flight head MRA without intravenous contrast.  3) Neck MRA without and  with IV contrast. Stenosis measurements made according to NASCET criteria unless otherwise specified.     FINDINGS:  HEAD MRI:  INTRACRANIAL CONTENTS: Small focus of acute to early subacute ischemia in the posterior limb left internal capsule measuring 13 x 6 mm. Mild FLAIR hyperintensity. No hemorrhage. Mild volume loss and presumed chronic small vessel ischemia. No mass, acute   hemorrhage, or extra-axial fluid collections. Normal position of the cerebellar tonsils. No pathologic contrast enhancement.     SELLA: No abnormality accounting for technique.     OSSEOUS STRUCTURES/SOFT TISSUES: Normal marrow signal. The major intracranial vascular flow-voids are maintained.      ORBITS: No abnormality accounting for technique.      SINUSES/MASTOIDS: No paranasal sinus mucosal disease. No middle ear or mastoid effusion.      HEAD MRA:   ANTERIOR CIRCULATION: No stenosis/occlusion, aneurysm, or high-flow vascular malformation. Standard [place] anatomy.     POSTERIOR CIRCULATION: No stenosis/occlusion, aneurysm, or high-flow vascular malformation. Balanced vertebral arteries supply a normal basilar artery.      NECK MRA:   RIGHT CAROTID: 60-70% narrowing right ICA just above the bifurcation by NASCET criteria.     LEFT CAROTID: Atheromatous disease without significant narrowing.     VERTEBRAL ARTERIES: No focal stenosis or dissection. Balanced vertebral arteries.     AORTIC ARCH: Classic aortic arch anatomy with no significant stenosis at the origin of the great vessels.        Impression     IMPRESSION:  HEAD MRI:   1.  Small acute to early subacute infarct posterior limb left internal capsule.     2.  Age-related and chronic ischemic changes.     HEAD MRA:   1.  Normal MRA [place].     NECK MRA:   1.  60-70% narrowing right ICA by NASCET criteria  2.  No other carotid or vertebral artery stenosis.      MRA Brain ([place] of [place]) wo Contrast     Narrative     EXAM: MR BRAIN WITHOUT AND WITH CONTRAST,  MRA BRAIN ([place]) WITHOUT CONTRAST, MRA NECK (CAROTIDS) WITHOUT AND WITH CONTRAST  LOCATION: [facility]  DATE: 07/20/2025     INDICATION: Dizziness, elevated blood pressure.  COMPARISON: CT head 07/19/2025.  CONTRAST: 10 mL Gadavist.  TECHNIQUE:   1) Routine multiplanar multisequence head MRI without and with intravenous contrast.  2) 3D time-of-flight head MRA without intravenous contrast.  3) Neck MRA without and with IV contrast. Stenosis measurements made according to NASCET criteria unless otherwise specified.     FINDINGS:  HEAD MRI:  INTRACRANIAL CONTENTS: Small focus of acute to early subacute ischemia in the posterior limb left internal capsule measuring 13 x 6 mm. Mild FLAIR hyperintensity. No hemorrhage. Mild volume loss and presumed chronic small vessel ischemia. No mass, acute   hemorrhage, or extra-axial fluid collections. Normal position of the cerebellar tonsils. No pathologic contrast enhancement.     SELLA: No abnormality accounting for technique.     OSSEOUS STRUCTURES/SOFT TISSUES: Normal marrow signal. The major intracranial vascular flow-voids are maintained.      ORBITS: No abnormality accounting for technique.      SINUSES/MASTOIDS: No paranasal sinus mucosal disease. No middle ear or mastoid effusion.      HEAD MRA:   ANTERIOR CIRCULATION: No stenosis/occlusion, aneurysm, or high-flow vascular malformation. Standard [place] of [place] anatomy.     POSTERIOR CIRCULATION: No stenosis/occlusion, aneurysm, or high-flow vascular malformation. Balanced vertebral arteries supply a normal basilar artery.      NECK MRA:   RIGHT CAROTID: 60-70% narrowing right ICA just above the bifurcation by NASCET criteria.     LEFT CAROTID: Atheromatous disease without significant narrowing.     VERTEBRAL ARTERIES: No focal stenosis or dissection. Balanced vertebral arteries.     AORTIC ARCH: Classic aortic arch anatomy with no significant stenosis at the origin of the great  vessels.        Impression     IMPRESSION:  HEAD MRI:   1.  Small acute to early subacute infarct posterior limb left internal capsule.     2.  Age-related and chronic ischemic changes.     HEAD MRA:   1.  Normal MRA [place].     NECK MRA:   1.  60-70% narrowing right ICA by NASCET criteria  2.  No other carotid or vertebral artery stenosis.

## 2025-07-21 NOTE — PROVIDER NOTIFICATION
REQUEST input from stroke team on DVT Prevention   Please update the medical team if ok to start on any heparin/lovenox  Dr. Nay Sanford

## 2025-07-21 NOTE — PLAN OF CARE
Goal Outcome Evaluation:           Overall Patient Progress: improvingOverall Patient Progress: improving       Reason for Admission: Acute/subacute L internal capsule ischemic stroke.    Cognitive/Mentation: A/Ox 4  Neuros/CMS: Slight R facial droop. Slightly weaker on R side, 4/5 RUE/RLE. Slight R pronator drift. Baseline blurry vision.  VS: VSS on RA ex HTN, within parameters. SBP <180.    Tele: NSR.  /GI: Continent. Last BM 7/20/25.   Pulmonary: LS clear.  Pain: Denies. Chronic lower back and neck pain.    Drains/Lines: R PIV, SL.   Skin: Intact.  Activity: Assist x 1-2 with GBW.  Diet: Regular with thin liquids. Takes pills whole.     Therapies recs: TCU vs. Home with assist.  Discharge: Pending further stroke workup.    Aggression Stoplight Tool: Green    End of shift summary: ECHO today.

## 2025-07-21 NOTE — PROGRESS NOTES
[facility]    Medicine Progress Note - Hospitalist Service    Date of Admission:  7/19/2025  Interval History   Patient is feeling better. Patient and daughter have lots of questions about the stroke. Daughter had questions why BP was thought to contribute to stroke as her BP has been 130-140 most of the time.   Had additional BP meds added recently to improve her BP control.   Her blood sugars are elevated. She was on a regular diet until noon that was changed to diabetic diet at noon today and supper.   She is taking a lot of short acting insulin. Reviewed at length with patient and her daughter     Assessment & Plan   Judi Moore is a 69 year old female with a past medical history of type 2 diabetes, hypertension, dyslipidemia, sleep apnea, CKD stage IV presents to the hospital with left-sided weakness and ataxia.  Patient reports that her symptoms started 1 day prior to presentation.  She reports having difficulty ambulating.  Additionally the patient has been having elevated blood pressure readings at home with systolics in the 200s.  She presented to [facility] on July 18 for her hypertension but at that time did not have any weakness.  She was treated with a dose of hydralazine and was discharged home.  She reports that she has been compliant with her medications however her blood pressure has remained elevated.  She provides no other complaints       Right facial Weakness and difficulty with ambulation (LLE)   Acute to subacute left internal capsule ischemic stroke, etiology likely small vessel disease vs ESUS  History of prior cerebellar stroke (MRI 2023, image unavailable for personal review)  Patient presents with left-sided weakness with LLE and difficulty ambulating.   Right  sided facial droop   CT scan of the head negative for acute pathology.  Due to her kidney function the patient did not want a CTA.  Her case was discussed with stroke neurology  7/20 MRI  showing small acute to early subacute infarct posterior limb left internal capsule.  Chronic ischemic changes.  Head MRA normal [place]  Neck MRA 60 to 70% narrowing right ICA  7/19 stroke neurology assessment.  Started on DAPT with aspirin 81 and Plavix 300 mg loading. >> 75 mg po daily + ASA 81   Permissive hypertension < 180 but long term goal < 130/80  May increase statin for LDL (crestor to 20 mg po daily)   ECHO with normal EF.  Concentric mild to moderate LVH.  No significant valvular disease.  BP slow to lower < 180 (on multiple BP meds PTA and this will be ongoing)   In 6-8 weeks with any stroke RAY ([phone]) (ordered)        Polycythemia  Heme/onc consult   Has been present on multiple labs in the last three months.Continue pta aspirin  Follow-up hematology consult  7/20 appreciate input from hematology regarding her polycythemia and any potential contribution to stroke  7/20 hematology consult: primary and secondary polycythemia evaluation  Ordered JAK2 mutation (present nearly 100% polycythemia)   Follow up outpatient hematology      Type 2 diabetes with long-term insulin use  A1c was 7.7% in June 2025  Insulin sliding scale  Hypoglycemia protocol  Diabetic diet with sliding scale coverage  7/21 reviewed extensively with patient/daughter and . Currently has elevated blood sugars up from her baseline (by patient history significantly more elevated)   She has resumed her 10 units at night last night 7/20 Her noon and supper meals were diabetic with today carb counts at home 1:15  and correction scale. She still is elevated  At first did not want to increase her lantus at night.   ?? Gly hgb  7.7   At home on average she takes about 15 units a meal. (Carb count and sliding scale)   Her sugars are consistently > 300   She was on regular diet until noon today   Recommend increase lantus but patient is concerned it will overshoot.   Explained the 50:50 ratio and considering. Seems to be  using on average 15 units per meal. !!      Hypertension  Dyslipidemia  PTA hydralazine, Lasix, Coreg, atorvastatin, aspirin  Resumed on none of her home blood pressure meds currently to allow for permissive hypertension  7/21 discussion with stroke team and ok to still allow some permissive hypertension. Lower BP over the next 1-2 week. NOW goal < 180   SLOWLY readd of BP meds. Started on coreg 12.5 mg po BID today   She is on multiple BP meds and daily eval required. STROKE team did not recommend abrupt normalization       Sleep apnea  Does not tolerate CPAP     CKD stage IV  Secondary hyperparathyroidism  Chronic and stable.  Follows with nephrology  BMP stable     Disposition:   Patient has a very supportive  and daughter who are at the bedside.  She does appear to be somewhat unsteady with moving around and suspect she would benefit from a TCU to increase her strength and gait  Referred to TCU's.  Would recommend this is a consideration        Diet: Combination Diet Regular Diet Adult    DVT Prophylaxis: Pneumatic Compression Devices: Confirm DVT prevention 7/21 with stroke team started on lovenox 40 mg subcutaneous daily   Daniel Catheter: Not present  Lines: None     Cardiac Monitoring: ACTIVE order. Indication: Stroke, acute (48 hours)  Code Status: Full Code      Clinically Significant Risk Factors Present on Admission                 # Drug Induced Platelet Defect: home medication list includes an antiplatelet medication   # Hypertension: Home medication list includes antihypertensive(s)          # DMII: A1C = 7.7 % (Ref range: <5.7 %) within past 6 months              Social Drivers of Health            Disposition Plan     Medically Ready for Discharge: Anticipated Tomorrow       JEWEL ORO MD  Hospitalist Service  [facility]  Securely message with [employer] (more info)  Text page via [employer] Paging/[employer]    ______________________________________________________________________      Physical Exam   Vital Signs: Temp: 97.7  F (36.5  C) Temp src: Oral BP: 133/89 Pulse: 82   Resp: 18 SpO2: 95 % O2 Device: None (Room air)    Weight: 0 lbs 0 oz    General Appearance:  Patient is comfortable sitting in the bed.   Respiratory: clear to auscultation bilaterally without wheezes or rhonchi  Cardiovascular: Regular rate and rhythm without gallop rub normal S1-S2  GI: Positive bowel sounds soft rebound guarding tenderness  Skin: No overt edema      Medical Decision Making       50 MINUTES SPENT BY ME on the date of service doing chart review, history, exam, documentation & further activities per the note.    Multiple discussions regarding diabetes, hypertension.  Discussion with the consulting team  Data     I have personally reviewed the following data over the past 24 hrs:    6.0  \   15.8 (H)   / 124 (L)     N/A N/A N/A /  313 (H)   N/A N/A N/A \     Ferritin:  N/A % Retic:  2.5 (H) LDH:  N/A       Imaging results reviewed over the past 24 hrs:       Recent Results (from the past 24 hours)   CT Head w/o Contrast     Narrative     EXAM: CT HEAD W/O CONTRAST  LOCATION: [facility]  DATE: 7/19/2025     INDICATION: Left sided weakness, dizziness, onset 2 days ago  COMPARISON: None.  TECHNIQUE: Routine CT Head without IV contrast. Multiplanar reformats. Dose reduction techniques were used.     FINDINGS:  INTRACRANIAL CONTENTS: No intracranial hemorrhage, extraaxial collection, or mass effect.  No CT evidence of acute infarct. Mild presumed chronic small vessel ischemic changes. Mild generalized volume loss. No hydrocephalus.      VISUALIZED ORBITS/SINUSES/MASTOIDS: No intraorbital abnormality. Mild mucosal thickening scattered about the paranasal sinuses. No middle ear or mastoid effusion.     BONES/SOFT TISSUES: No acute abnormality.        Impression     IMPRESSION:  1.  No CT evidence for acute  intracranial process.  2.  Brain atrophy and presumed chronic microvascular ischemic changes as above.      MR Brain w/o & w Contrast     Narrative     EXAM: MR BRAIN WITHOUT AND WITH CONTRAST, MRA BRAIN ([place]) WITHOUT CONTRAST, MRA NECK (CAROTIDS) WITHOUT AND WITH CONTRAST  LOCATION: [facility]  DATE: 07/20/2025     INDICATION: Dizziness, elevated blood pressure.  COMPARISON: CT head 07/19/2025.  CONTRAST: 10 mL Gadavist.  TECHNIQUE:   1) Routine multiplanar multisequence head MRI without and with intravenous contrast.  2) 3D time-of-flight head MRA without intravenous contrast.  3) Neck MRA without and with IV contrast. Stenosis measurements made according to NASCET criteria unless otherwise specified.     FINDINGS:  HEAD MRI:  INTRACRANIAL CONTENTS: Small focus of acute to early subacute ischemia in the posterior limb left internal capsule measuring 13 x 6 mm. Mild FLAIR hyperintensity. No hemorrhage. Mild volume loss and presumed chronic small vessel ischemia. No mass, acute   hemorrhage, or extra-axial fluid collections. Normal position of the cerebellar tonsils. No pathologic contrast enhancement.     SELLA: No abnormality accounting for technique.     OSSEOUS STRUCTURES/SOFT TISSUES: Normal marrow signal. The major intracranial vascular flow-voids are maintained.      ORBITS: No abnormality accounting for technique.      SINUSES/MASTOIDS: No paranasal sinus mucosal disease. No middle ear or mastoid effusion.      HEAD MRA:   ANTERIOR CIRCULATION: No stenosis/occlusion, aneurysm, or high-flow vascular malformation. Standard [place] anatomy.     POSTERIOR CIRCULATION: No stenosis/occlusion, aneurysm, or high-flow vascular malformation. Balanced vertebral arteries supply a normal basilar artery.      NECK MRA:   RIGHT CAROTID: 60-70% narrowing right ICA just above the bifurcation by NASCET criteria.     LEFT CAROTID: Atheromatous disease without significant  narrowing.     VERTEBRAL ARTERIES: No focal stenosis or dissection. Balanced vertebral arteries.     AORTIC ARCH: Classic aortic arch anatomy with no significant stenosis at the origin of the great vessels.        Impression     IMPRESSION:  HEAD MRI:   1.  Small acute to early subacute infarct posterior limb left internal capsule.     2.  Age-related and chronic ischemic changes.     HEAD MRA:   1.  Normal MRA [place].     NECK MRA:   1.  60-70% narrowing right ICA by NASCET criteria  2.  No other carotid or vertebral artery stenosis.      MRA Neck (Carotids) wo & w Contrast     Narrative     EXAM: MR BRAIN WITHOUT AND WITH CONTRAST, MRA BRAIN ([place]) WITHOUT CONTRAST, MRA NECK (CAROTIDS) WITHOUT AND WITH CONTRAST  LOCATION: [facility]  DATE: 07/20/2025     INDICATION: Dizziness, elevated blood pressure.  COMPARISON: CT head 07/19/2025.  CONTRAST: 10 mL Gadavist.  TECHNIQUE:   1) Routine multiplanar multisequence head MRI without and with intravenous contrast.  2) 3D time-of-flight head MRA without intravenous contrast.  3) Neck MRA without and with IV contrast. Stenosis measurements made according to NASCET criteria unless otherwise specified.     FINDINGS:  HEAD MRI:  INTRACRANIAL CONTENTS: Small focus of acute to early subacute ischemia in the posterior limb left internal capsule measuring 13 x 6 mm. Mild FLAIR hyperintensity. No hemorrhage. Mild volume loss and presumed chronic small vessel ischemia. No mass, acute   hemorrhage, or extra-axial fluid collections. Normal position of the cerebellar tonsils. No pathologic contrast enhancement.     SELLA: No abnormality accounting for technique.     OSSEOUS STRUCTURES/SOFT TISSUES: Normal marrow signal. The major intracranial vascular flow-voids are maintained.      ORBITS: No abnormality accounting for technique.      SINUSES/MASTOIDS: No paranasal sinus mucosal disease. No middle ear or mastoid effusion.      HEAD MRA:    ANTERIOR CIRCULATION: No stenosis/occlusion, aneurysm, or high-flow vascular malformation. Standard [place] anatomy.     POSTERIOR CIRCULATION: No stenosis/occlusion, aneurysm, or high-flow vascular malformation. Balanced vertebral arteries supply a normal basilar artery.      NECK MRA:   RIGHT CAROTID: 60-70% narrowing right ICA just above the bifurcation by NASCET criteria.     LEFT CAROTID: Atheromatous disease without significant narrowing.     VERTEBRAL ARTERIES: No focal stenosis or dissection. Balanced vertebral arteries.     AORTIC ARCH: Classic aortic arch anatomy with no significant stenosis at the origin of the great vessels.        Impression     IMPRESSION:  HEAD MRI:   1.  Small acute to early subacute infarct posterior limb left internal capsule.     2.  Age-related and chronic ischemic changes.     HEAD MRA:   1.  Normal MRA [place].     NECK MRA:   1.  60-70% narrowing right ICA by NASCET criteria  2.  No other carotid or vertebral artery stenosis.      MRA Brain ([place]) wo Contrast     Narrative     EXAM: MR BRAIN WITHOUT AND WITH CONTRAST, MRA BRAIN ([place]) WITHOUT CONTRAST, MRA NECK (CAROTIDS) WITHOUT AND WITH CONTRAST  LOCATION: [facility]  DATE: 07/20/2025     INDICATION: Dizziness, elevated blood pressure.  COMPARISON: CT head 07/19/2025.  CONTRAST: 10 mL Gadavist.  TECHNIQUE:   1) Routine multiplanar multisequence head MRI without and with intravenous contrast.  2) 3D time-of-flight head MRA without intravenous contrast.  3) Neck MRA without and with IV contrast. Stenosis measurements made according to NASCET criteria unless otherwise specified.     FINDINGS:  HEAD MRI:  INTRACRANIAL CONTENTS: Small focus of acute to early subacute ischemia in the posterior limb left internal capsule measuring 13 x 6 mm. Mild FLAIR hyperintensity. No hemorrhage. Mild volume loss and presumed chronic small vessel ischemia. No mass, acute    hemorrhage, or extra-axial fluid collections. Normal position of the cerebellar tonsils. No pathologic contrast enhancement.     SELLA: No abnormality accounting for technique.     OSSEOUS STRUCTURES/SOFT TISSUES: Normal marrow signal. The major intracranial vascular flow-voids are maintained.      ORBITS: No abnormality accounting for technique.      SINUSES/MASTOIDS: No paranasal sinus mucosal disease. No middle ear or mastoid effusion.      HEAD MRA:   ANTERIOR CIRCULATION: No stenosis/occlusion, aneurysm, or high-flow vascular malformation. Standard [place] anatomy.     POSTERIOR CIRCULATION: No stenosis/occlusion, aneurysm, or high-flow vascular malformation. Balanced vertebral arteries supply a normal basilar artery.      NECK MRA:   RIGHT CAROTID: 60-70% narrowing right ICA just above the bifurcation by NASCET criteria.     LEFT CAROTID: Atheromatous disease without significant narrowing.     VERTEBRAL ARTERIES: No focal stenosis or dissection. Balanced vertebral arteries.     AORTIC ARCH: Classic aortic arch anatomy with no significant stenosis at the origin of the great vessels.        Impression     IMPRESSION:  HEAD MRI:   1.  Small acute to early subacute infarct posterior limb left internal capsule.     2.  Age-related and chronic ischemic changes.     HEAD MRA:   1.  Normal MRA [place].     NECK MRA:   1.  60-70% narrowing right ICA by NASCET criteria  2.  No other carotid or vertebral artery stenosis.

## 2025-07-21 NOTE — DISCHARGE INSTRUCTIONS
"    Some additional resources:      Web: https://[employer].org/   Phone: 748.723.6496   [employer] is a nonprofit serving seniors and individuals with disabilities   across [place]. Our grocery   assistance, home support and transportation services provide help with in-  home tasks and chores.    _____________________________________________    Meals on Wheels [facility] call [phone]             [employer]   Web: https://[employer]/services/  Phone: (844) 460-9897          Other resources:   call to reach someone who can connect you with more resources!                 If your family would like extra support, here is one great resource:   \"Care[employer]\" call 731-900-CARE(6102)  Customer service hours: Monday - Saturday, 9 a.m. - 7 p.m.             Your risk factors for stroke or TIA (transient ischemic attack):     Your Risk Factors Your Results Goals   [x] High blood pressure BP: (!) 181/93 (07/23/25 0933) Less than 120/80   [x] Cholesterol          Total 7/19/2025: 154 mg/dL   Less than 150    Triglycerides   7/19/2025: 51 mg/dL Less than 150    LDL 7/19/2025: 80 mg/dL    Less than 70    HDL 7/19/2025: 64 mg/dL         Greater than 40 (men)  Greater than 50 (women)   [x] Diabetes                A1C 7/19/2025: 7.7 % Less than 5.7   [] Atrial fibrillation No atrial fibrillation noted on cardiac monitor Manage per physician orders   [] Smoking/tobacco use   Tobacco Use      Smoking status: Never      Smokeless tobacco: Never   Quit smoking and tobacco   [x] Overweight Body mass index is 31.93 kg/m .  Less than 25     Other risk factors include: carotid (neck) artery disease, other heart diseases, prior stroke or TIA, poor diet, lack of exercise, and excessive alcohol consumption.     [] Written stroke educational materials given to patient and family including:   - Learning about BE FAST: Stroke Warning Signs and " Learning about Risk Factors for Stroke (Healthwise)   - Understanding Stroke: Key Resources After a Stroke (FOD #468959)       Know the warning signs and symptoms of stroke: BE FAST     B = Balance loss   E = Eyesight changes   F = Facial droop or numbness   A = Arm or leg weakness   S = Speech difficulty, slurred speech   T = Time to call 911 for help

## 2025-07-21 NOTE — CONSULTS
Care Management Initial Consult    General Information  Assessment completed with: Patient, Children, Judi, Jasmyn, daughter  Type of CM/SW Visit: Initial Assessment    Primary Care Provider verified and updated as needed: Yes   Readmission within the last 30 days: no previous admission in last 30 days      Reason for Consult: discharge planning  Advance Care Planning: Advance Care Planning Reviewed: no concerns identified, questions answered  provided blank HCD       Communication Assessment  Patient's communication style: spoken language (English or Bilingual)             Cognitive  Cognitive/Neuro/Behavioral: .WDL except  Level of Consciousness: alert  Arousal Level: opens eyes spontaneously  Orientation: oriented x 4  Mood/Behavior: calm, cooperative  Best Language: 0 - No aphasia  Speech: clear, spontaneous, logical    Living Environment:   People in home: spouse     Current living Arrangements: house      Able to return to prior arrangements:         Family/Social Support:  Care provided by: self  Provides care for: spouse  Marital Status:   Support system: , See Flowers       Description of Support System: Supportive         Current Resources:   Patient receiving home care services: No        Community Resources: None  Equipment currently used at home: cane, straight, shower chair  Supplies currently used at home: Diabetic Supplies    Employment/Financial:  Employment Status: retired        Financial Concerns: none           Does the patient's insurance plan have a 3 day qualifying hospital stay waiver?  No    Lifestyle & Psychosocial Needs:  Social Drivers of Health     Food Insecurity: Not on file   Depression: Not at risk (3/21/2025)    Received from [facility]     PHQ-2     PHQ2 Total: 0   Housing Stability: Not on file   Tobacco Use: Low Risk  (6/18/2025)    Received from [facility]    Patient History     Smoking Tobacco Use: Never     Smokeless Tobacco Use: Never  "    Passive Exposure: Never   Financial Resource Strain: Not on file   Alcohol Use: Not on file   Transportation Needs: Not on file   Physical Activity: Not on file   Interpersonal Safety: Not At Risk (7/18/2025)    Received from [facility]    Humiliation, Afraid, Rape, and Kick questionnaire     Fear of Current or Ex-Partner: No     Emotionally Abused: No     Physically Abused: No     Sexually Abused: No   Stress: Not on file   Social Connections: Not on file   Health Literacy: Not on file       Functional Status:  Prior to admission patient needed assistance:   Dependent ADLs:: Independent  Dependent IADLs:: Independent, Transportation       Mental Health Status:  Mental Health Status: No Current Concerns       Chemical Dependency Status:  Chemical Dependency Status: No Current Concerns             Values/Beliefs:  Spiritual, Cultural Beliefs, Roman Catholic Practices, Values that affect care: no               Discussed  Partnership in Safe Discharge Planning  document with patient/family: No    Additional Information:  CM consult for discharge planning.  Per chart review, patient \"is a 69 year old female with a past medical history of type 2 diabetes, hypertension, dyslipidemia, sleep apnea, CKD stage IV presents to the hospital with left-sided weakness and ataxia.  Patient reports that her symptoms started 1 day prior to presentation.  She reports having difficulty ambulating.  Additionally the patient has been having elevated blood pressure readings at home with systolics in the 200s.\"    Met with patient and her daughter Jasmyn.  Her daughter lives in [place] and is visiting until Saturday.  Patient lives with her  in a house.  She states he has been ill.  She does not drive; her  drives but not so much since he has been ill.  She said she received some paperwork to fill out to help get transportation (?[employer]).  Daughter will get paperwork and help her complete.  She says they order " groceries from [employer].  She does have another daughter in the [facility] area but state she works and has young children.  Reviewed recommendation for ARU.  They had questions about help at home for her and her .  Explained private duty care and skilled care.  Also provided a senior guide book and blank HCD.  Added community resources to discharge AVS.    Referral sent to [facility].    Next Steps: CM continue to follow for ARU placement.  Will need [employer] auth.    Khushi Fisher RN, BSN, PHN  Inpatient Care Coordination  [facility] [place]  Phone: [phone]

## 2025-07-22 ENCOUNTER — APPOINTMENT (OUTPATIENT)
Dept: OCCUPATIONAL THERAPY | Facility: CLINIC | Age: 70
DRG: 065 | End: 2025-07-22
Payer: COMMERCIAL

## 2025-07-22 ENCOUNTER — APPOINTMENT (OUTPATIENT)
Dept: PHYSICAL THERAPY | Facility: CLINIC | Age: 70
DRG: 065 | End: 2025-07-22
Payer: COMMERCIAL

## 2025-07-22 LAB
GLUCOSE BLDC GLUCOMTR-MCNC: 119 MG/DL (ref 70–99)
GLUCOSE BLDC GLUCOMTR-MCNC: 136 MG/DL (ref 70–99)
GLUCOSE BLDC GLUCOMTR-MCNC: 150 MG/DL (ref 70–99)
GLUCOSE BLDC GLUCOMTR-MCNC: 170 MG/DL (ref 70–99)
GLUCOSE BLDC GLUCOMTR-MCNC: 202 MG/DL (ref 70–99)
GLUCOSE BLDC GLUCOMTR-MCNC: 268 MG/DL (ref 70–99)
GLUCOSE BLDC GLUCOMTR-MCNC: 310 MG/DL (ref 70–99)

## 2025-07-22 PROCEDURE — 250N000011 HC RX IP 250 OP 636: Performed by: INTERNAL MEDICINE

## 2025-07-22 PROCEDURE — 97110 THERAPEUTIC EXERCISES: CPT | Mod: GP

## 2025-07-22 PROCEDURE — 97116 GAIT TRAINING THERAPY: CPT | Mod: GP

## 2025-07-22 PROCEDURE — 97530 THERAPEUTIC ACTIVITIES: CPT | Mod: GP

## 2025-07-22 PROCEDURE — 250N000013 HC RX MED GY IP 250 OP 250 PS 637: Performed by: INTERNAL MEDICINE

## 2025-07-22 PROCEDURE — 97112 NEUROMUSCULAR REEDUCATION: CPT | Mod: GO

## 2025-07-22 PROCEDURE — 250N000013 HC RX MED GY IP 250 OP 250 PS 637: Performed by: HOSPITALIST

## 2025-07-22 PROCEDURE — 120N000001 HC R&B MED SURG/OB

## 2025-07-22 PROCEDURE — 250N000013 HC RX MED GY IP 250 OP 250 PS 637: Performed by: NURSE PRACTITIONER

## 2025-07-22 PROCEDURE — 99232 SBSQ HOSP IP/OBS MODERATE 35: CPT

## 2025-07-22 RX ADMIN — CLOPIDOGREL BISULFATE 75 MG: 75 TABLET, FILM COATED ORAL at 09:27

## 2025-07-22 RX ADMIN — ASPIRIN 81 MG CHEWABLE TABLET 81 MG: 81 TABLET CHEWABLE at 09:27

## 2025-07-22 RX ADMIN — ATORVASTATIN CALCIUM 20 MG: 20 TABLET, FILM COATED ORAL at 20:21

## 2025-07-22 RX ADMIN — CARVEDILOL 12.5 MG: 12.5 TABLET, FILM COATED ORAL at 17:46

## 2025-07-22 RX ADMIN — CARVEDILOL 12.5 MG: 12.5 TABLET, FILM COATED ORAL at 09:27

## 2025-07-22 RX ADMIN — ENOXAPARIN SODIUM 40 MG: 40 INJECTION SUBCUTANEOUS at 15:47

## 2025-07-22 ASSESSMENT — ACTIVITIES OF DAILY LIVING (ADL)
ADLS_ACUITY_SCORE: 63
ADLS_ACUITY_SCORE: 53
ADLS_ACUITY_SCORE: 63
ADLS_ACUITY_SCORE: 55
ADLS_ACUITY_SCORE: 55
ADLS_ACUITY_SCORE: 63
ADLS_ACUITY_SCORE: 63
ADLS_ACUITY_SCORE: 59
ADLS_ACUITY_SCORE: 55
ADLS_ACUITY_SCORE: 63
ADLS_ACUITY_SCORE: 53
ADLS_ACUITY_SCORE: 55
ADLS_ACUITY_SCORE: 53
ADLS_ACUITY_SCORE: 55
ADLS_ACUITY_SCORE: 59
ADLS_ACUITY_SCORE: 55
ADLS_ACUITY_SCORE: 53

## 2025-07-22 NOTE — PLAN OF CARE
Reason for Admission: L ischemic stroke    Cognitive/Mentation: A/Ox 4  Neuros/CMS: Intact ex R sided 4/5 strength with RUE drift  VS: VSS on RA.   Tele: Normal sinus rhythm.  /GI: Continent.   Pulmonary: LS clear.  Pain: Denies.     Drains/Lines: PIV SL  Skin: Intact  Activity: Assist x 1 with GBW, family helping when in room.  Diet: Mod carb with thin liquids. Takes pills whole.     Therapies recs: Tcu vs Home  Discharge: Pending    Aggression Stoplight Tool: Green    End of shift summary: Patient vitally and neurologically stable through shift. Family wanting BSG checks every 2 hours for concern that BSG will get to low. BSG has been in 300's and now down in the 100's. Educating patient and family that BSG are fine unless below 70. Starting long acting insulin during the day today.

## 2025-07-22 NOTE — PROGRESS NOTES
Care Management Follow Up    Length of Stay (days): 2    Expected Discharge Date: 07/22/2025     Concerns to be Addressed: discharge planning     Patient plan of care discussed at interdisciplinary rounds: Yes    Anticipated Discharge Disposition: [facility]    Anticipated Discharge Services: therapies    Referrals Placed by CM/ESTEPHANIA: Post Acute Facilities  Private pay costs discussed: Not applicable today    Additional Information:  [facility] accepted patient and a bed is likely available when patient is medically cleared. ESTEPHANIA began [employer] insurance prior authorization by [employer] and request information to [phone].     SW met with patient and daughter to discuss next steps with discharge planning. We are currently waiting on medical clearance and insurance prior authorization to be able to go to ARU. They verbalized understanding. Discussed services/supports in the long term and provided information on [facility] front door and applying for [employer] and Medical Assistance. Spoke about how MA can cover usually a few hours a week of PCA services, but for obtaining coverage of assisted living the patient would need a waiver.     ADD: [id number]   ESTEPHANIA completed phone call with [employer] to provide clinicals to a nurse over the phone, it was then transferred to the medical director for review. Case number [id number]. Tentative ride scheduled in the event that patient can go; M Kran[employer] wheelchair is scheduled for pickup 7/23 between [id number] to go to [facility] pending insurance authorization and bed availability. ESTEPHANIA met with patient and her daughter to update them on the tentative and discuss ARU again. Discussed the level of care of ARU and the therapy program. Discussed that patient will be in a hospital setting with continued medical monitoring and rounding doctors. SW provided info to daughter Jasmyn and patient on transport, and associated costs, and they would prefer medical transport.      Mallorie Sloan, DAMIAN, SW   Social Work

## 2025-07-22 NOTE — PLAN OF CARE
Pt here with L internal capsule ischemic stroke. A&O x4. Neuros slight R droop and R sideed weakness. VSS on RA. Mod Carb diet, thin liquids. Takes pills whole. Up with AO1 with GB/walker. Denies pain. Pt scoring green on the Aggression Stop Light Tool. Plan is to discharge to ARU tomorrow via wheelchair between [id number].

## 2025-07-22 NOTE — INTERIM SUMMARY
[facility] Pre-Admission Screen    Referral Source:  [facility] NEUROSCIENCE UNIT  CARDIAC SERVICES  Admit date to referring facility: 7/19/2025    Physical Medicine and Rehab Consult Completed: No    Rehab Diagnosis:    Stroke Ischemic 01.2 (R) Body Involvement (L) Brain; Acute to subacute left internal capsule ischemic stroke, etiology likely small vessel disease vs ESUS    Justification for Acute Inpatient Rehabilitation  Judi Moore is a 69 year old female with past medical history of Polycythemia, HTN, DM, prior cerebellar stroke (MRI 2023, image unavailable for personal review and patient also not aware of this finding) who presented to ED after 1 day of symptoms, with difficulty walking independently, dizziness and noted to have weak RUE per ED MD exam. Additionally the patient has been having elevated blood pressure readings at home with systolics in the 200s. Of note, pt had presented to [facility] ED with elevated BP but was treated with a dose of hydralazine and was discharged home. She reports that she has been compliant with her medications however her blood pressure has remained elevated. During this current admission, Stroke team was consulted after MRI brain showed acute to subacute left internal capsule ischemic stroke. MRA head/neck with no large vessel occlusion, 50 to 60%, right ICA stenosis. Neuro attributes cause of stroke to be likely small vessel disease with hypertension contributing vs ESUS. Hospital course is notable for hypertension needing management of BP medications, hyperglycemia needing management of diabetic regimen,and polycythemia. PT is now stable for transfers to inpatient rehab with continued medical management at least 3 times per week to manage these conditions. Hospitalist needed to manage BP and BG control.     At baseline, pt lives in a home with 4 JOSE LUIS with her spouse. She is Mod independent with ADL, mobility and IADL with  use of a cane. Her spouse does the driving and they order groceries from [employer]. She is now far from her baseline needing assist of 1 for all ADL and mobility after a stroke and is in need of intensive neuro rehab with PT and OT, not available in a lower level of care. Patient requires an intensive inpatient rehab program to address the following acute impairments: L hemiplegia, facial weakness, impaired activity tolerance, impaired balance, impaired coordination, impaired sensation, and impaired weight shifting.    Current Active Medical Management Needs/Risks for Clinical Complications  The patient requires the high level of rehabilitation physician supervision that accompanies the provision of intensive rehabilitation therapy.  The patient needs the services of the rehabilitation physician to assess the patient medically and functionally and to modify the course of treatment as needed to maximize the patient's capacity to benefit from the rehabilitation process.    Neuro: Pt is at risk for neurologic decline, secondary stroke, falls with injury, post stroke pain and depression, impaired tone and ROM. Continue nuero checks to assess for neurologic recovery. Provide stroke education, including proper medication management. Continue Plavix (clopidogrel) 75 mg PO Daily for 3 weeks, and continue aspirin 81 mg daily for secondary stroke prevention. Continue Lipitor 20 mg today for LDL goal of 40-70. PMR to assess needs for positioning, tone, ROM, DME for physical recovery.   BP: Pt is at risk for continued hypertension and fluctuating neuro symptoms related to BP. She continues to have elevated BPs with BP as high as 179/79 in the last 24 hours. Permissive hypertension < 180 while hospitalized, however will be working towards long term BP goal. Had additional BP meds added recently to improve her BP control. SBP goal <130/80 long-term.   Diabetes: Pt is at risk for uncontrolled BG; her blood sugars have remained  elevated and as high as 310 in the last 24 hours. She was on a regular diet that was changed to diabetic diet for better glycemic control. Continue to provide diabetic education, assess and manage diabetic regimen and symptoms.   Heme: Pt with polycythemia and at risk for DVT, embolic events, HTN. Continue aspirin per hematology; outpatient hematology follow-up planned. JAK2 mutation ordered to assess for myeloproliferative disorder. Assess for signs of DVT.  Kidney function: Pt with stage IV kidney disease and at risk for LAKIA. Continue to assess renal function and symptoms.   DVT risk: Okay to initiate DVT prophylaxis       Past Medical/Surgical History  Surgery in the past 100 days: No  Additional relevant past medical history: VANESSA, intolerant to CPAP    Level of Functioning Prior to Admission:    LIVING ENVIRONMENT  People in Home: spouse  Current Living Arrangements: house  Home Accessibility: stairs to enter home, stairs within home  Number of Stairs, Main Entrance: 4  Stair Railings, Main Entrance: none  Number of Stairs, Within Home, Primary: eight  Stair Railings, Within Home, Primary: railings safe and in good condition  Transportation Anticipated: family or friend will provide  Living Environment Comments: Pt lives in house w/ 4 JOSE LUIS with . Laundry in basement, walk in shower and tub.     SELF-CARE  Usual Activity Tolerance: good  Regular Exercise: No  Equipment Currently Used at Home: cane, straight, shower chair  Activity/Exercise/Self-Care Comment: IND w/ dressing, bathing.      Level of Function: GG Scale (Section GG Functional Ability and Goals; CMS's BLANCAS Version 3.0 Manual effective 10.1.2019):  PT Current Function Goals for Rehab   Bed Rolling 3 Partial/moderate assistance 6 Independent   Supine to Sit 3 Partial/moderate assistance 6 Independent   Sit to Stand 3 Partial/moderate assistance 6 Independent   Transfer 3 Partial/moderate assistance 6 Independent   Ambulation 3 Partial/moderate  assistance 6 Independent   Stairs 88 Not attempted due to safety 4 Supervision or touching assistance     OT Current Function Goals for Rehab   Feeding 5 Setup or clean-up assistance 6 Independent   Grooming 3 Partial/moderate assistance 6 Independent   Bathing Not completed 4 Supervision or touching assistance   Upper Body Dressing Not completed 6 Independent   Lower Body Dressing 3 Partial/moderate assistance 6 Independent   Toileting 3 Partial/moderate assistance 6 Independent   Toilet Transfer 3 Partial/moderate assistance 6 Independent   Tub/Shower Transfer Not completed 5 Setup or clean-up assistance   Cognition Not Assessed Independent     SLP Current Function Goals for Rehab   Swallow Not Impaired Not applicable   Communication Not Impaired Not applicable       Current Diet:  0-Thin, 7-Regular, and Diabetic    Summary Statement:  Pt is a 69 year old previously independent woman who was living independently with her spouse, who is now significantly below baseline after a stroke with resulting R sided hemiplegia. She is participating well in PT and OT and is making gains. Transfers with Min A; ambulates with WW and up to Min A with cues for decreased step length and foot clearance on RLE. 8 reps of step ups onto single stair with bilateral rail use, Ramila, cues for sequencing. Pt demonstrates decreased eccentric control highlighting LE weakness. Pt demonstrates R knee buckling with prolonged standing tasks. OT notes impaired grasp/release/coordination/manipulation with R hand. Overall Min-Mod A needed for basic ADL tasks due to R hemiplegia. OT notes pt has no overt cognitive deficits, however this has not been formally assessed. SLP evaluated and signed off with pt tolerating a regular diet; screen for any newly identified SLP needs. Anticipate discharge home in 12 days with fmaily support and home care.     Expected Therapies and Services Required During Inpatient Rehab Admission  Intensity of Therapy:  Patient requires intensive therapies not available in a lesser level of care. Patient is motivated, making gains, and can tolerate 3 hours of therapy a day.  Physical Therapy: 90 minutes per day, 6 days a week for 12 days  Occupational Therapy: 90 minutes per day, 6 days a week for 12 days  Speech and Language Therapy: Not indicated at this time  Rehabilitation Nursing Needs: Patient requires 24 hour Rehab Nursing to manage vitals, medication education, tone management, positioning, carryover of new rehab techniques, care coordination, blood sugar management, diabetes education, assess neurologic status, stroke education, and provide safe environment for patient at falls risk.    Precautions/restrictions/special needs:   Precautions: fall precautions   Restrictions: NA   Special Needs: NA    Expected Level of Improvement: Mod (I) with ADL and mobility around the home with exception of SBA on stairs and with bathing due to fall risk. Anticipate pt to need assistance with IADL.  Expected Length of time to achieve: 12 days    Anticipated Discharge Needs:  Anticipated Discharge Destination: Home  Anticipated Discharge Support: Family member  24/7 support available : Unknown  Identified caregiver(s):  spouse, adult [place] and another who lives out of town  Anticipated Discharge Needs: Home with homecare    Identified challenges/barriers: lives with spouse who may not be able to physically assist much    Funding source: [employer] Medicare Advantage    Liaison signature/date/time: Funmi Pena CM 7/23/2025 11:04 AM     Physician statement of review and agreement:  I have reviewed and am in agreement of the need for IRF stay to address above functional and medical needs. In addition to above statements address, Patient requires intensive active and ongoing therapeutic intervention and multiple therapies; Patient requires medical supervision; Expected to actively participate in the intensive  rehab program; Sufficiently stable to actively participate; Expectation for measurable improvement in functional capacity or adaption to impairments.    MD signature/date/time:

## 2025-07-22 NOTE — PROGRESS NOTES
A&Ox4. Neuros intact except right sided 4/5 weakness on upper and lower extremity. VSS on RA. Denies pain. Up with 1 GB and walker.  at dinner and 299 at bedtime.  Daughter at bedtime

## 2025-07-22 NOTE — PROGRESS NOTES
[facility]    Medicine Progress Note - Hospitalist Service    Date of Admission:  7/19/2025    Assessment & Plan   Judi DONNA Moore 69-year-old female with history of type 2 diabetes, hypertension, dyslipidemia, sleep apnea, and CKD stage IV presented with 1-day history of left-sided weakness, ataxia, and difficulty ambulating, in the context of persistent severe home hypertension (SBP >200s) despite medication compliance; previously evaluated on 7/18 at [facility] for elevated BP without focal deficits, treated with hydralazine and discharged, now presenting with new neurologic symptoms.    Right Facial Weakness and LLE Weakness with Difficulty Ambulating  ? Acute to subacute ischemic stroke in the posterior limb of the left internal capsule  Etiology likely small vessel disease vs ESUS; prior cerebellar infarct noted on MRI 2023.  MRI brain (7/20): small acute to early subacute infarct in left internal capsule; chronic ischemic changes.  MRA head: normal [place]; MRA neck: 60-70% right ICA narrowing.  Echo: normal EF, concentric mild to moderate LVH, no significant valvular pathology.  BP permissive goal <180 for acute phase; long-term goal <130/80.  On DAPT: aspirin 81 mg + Plavix 75 mg daily (after 300 mg load on 7/19).  Rosuvastatin increased to 20 mg daily.  Neurology follow-up scheduled in 6-8 weeks with Stroke RAY    Polycythemia  ? Persistent on labs over several months  Heme/onc consulted 7/20 for primary vs secondary polycythemia evaluation.  JAK2 mutation ordered to assess for myeloproliferative disorder.  Continue aspirin per hematology; outpatient hematology follow-up planned.    Type 2 Diabetes Mellitus (on long-term insulin therapy)  A1c 7.7% (June 2025); currently elevated glucose values >300 on average.  On diabetic diet with carb counting (1:15) and sliding scale insulin.  > Resumed home Lantus 10 units nightly (7/20), but inadequate control.  > Patient hesitant  "to increase basal insulin; education provided on 50:50 basal/bolus distribution.  > Recommend gradual Lantus titration based on ongoing glucose trends.    Hypertension  Longstanding; currently under permissive hypertension for stroke care.  Home regimen includes hydralazine, Lasix, Coreg.  BP goal <180 short-term; eventual goal <130/80.  7/21: Restarted Coreg 12.5 mg BID; other agents to be reintroduced gradually.  Monitor daily with stroke neurology input to avoid rapid normalization.    Dyslipidemia  Rosuvastatin increased to 20 mg daily in context of recent ischemic stroke.  Long-term goal LDL <70 given vascular risk.    Obstructive Sleep Apnea  CPAP-intolerant per patient report.  Nocturnal hypoxia may contribute to vascular risk--consider alternate therapy in follow-up.    Chronic Kidney Disease Stage IV with Secondary Hyperparathyroidism  Stable renal function; managed by nephrology.  No acute kidney injury during admission; BMP remains stable.                 # Thrombocytopenia: Lowest platelets = 124 in last 2 days, will monitor for bleeding           # Obesity: Estimated body mass index is 31.93 kg/m  as calculated from the following:    Height as of this encounter: 1.575 m (5' 2\").    Weight as of this encounter: 79.2 kg (174 lb 9.6 oz)., PRESENT ON ADMISSION               Diet: Moderate Consistent Carb (60 g CHO per Meal) Diet    DVT Prophylaxis: Enoxaparin (Lovenox) subcutaneous per stroke neuro  Daniel Catheter: Not present  Lines: None     Cardiac Monitoring: ACTIVE order. Indication: Stroke, acute (48 hours)  Code Status: Full Code      Clinically Significant Risk Factors             Social Drivers of Health            Disposition Plan     Medically Ready for Discharge: Anticipated Tomorrow ro acute sara Powell MD  Hospitalist Service  [facility]  Securely message with [employer] (more info)  Text page via [facility] Paging/Directory "   ______________________________________________________________________    Interval History   No acute events ovenight, working with PT and OT looking forward to acute rehab discharge tomorrow    Physical Exam   Vital Signs: Temp: 97.8  F (36.6  C) Temp src: Oral BP: 118/75 Pulse: 73   Resp: 18 SpO2: 98 % O2 Device: None (Room air)    Weight: 174 lbs 9.6 oz    General Appearance: Not in distress  Respiratory: Clear to auscultation bilaterally, no added breath sounds  Cardiovascular: S1 and S2 well heard, no murmur or gallop  GI: Nontender, nondistended, positive bowel sounds  Skin: No rash  CNS: Alert and oriented x 3, nonfocal      Medical Decision Making       45 MINUTES SPENT BY ME on the date of service doing chart review, history, exam, documentation & further activities per the note.      Data     I have personally reviewed the following data over the past 24 hrs:    N/A  \   N/A   / N/A     135 100 38.6 (H) /  170 (H)   4.3 23 1.66 (H) \     ALT: 14 AST: 30 AP: 138 TBILI: 0.3   ALB: 3.6 TOT PROTEIN: 7.5 LIPASE: 19     Ferritin:  N/A % Retic:  N/A LDH:  N/A       Imaging results reviewed over the past 24 hrs:   Recent Results (from the past 24 hours)   Echocardiogram Complete   Result Value    LVEF  55-60%    Narrative    [id number]  [id number]  [id number]^ROMNAA^OJSE^KELLY     [facility]  Echocardiography Laboratory  [address] [place] [address]     Name: JUANITA WORKMAN  MRN: [id number]  : 1955  Study Date: 2025 02:56 PM  Age: 69 yrs  Gender: Female  Patient Location: [facility]  Reason For Study: CVA, CVA  Ordering Physician: JOSE AVENDANO  Performed By: BERNADETTE Brewer     BSA: 1.8 m2  Height: 62 in  Weight: 174 lb  HR: 72  BP: 175/121 mmHg  ______________________________________________________________________________  Procedure  Echocardiogram with two-dimensional, color and spectral Doppler. Definity (NDC  #[id number]) given intravenously.  Contrast Definity. Technically difficult  study.  ______________________________________________________________________________  Interpretation Summary     The visual ejection fraction is 55-60%. There is mild to moderate concentric  left ventricular hypertrophy.  The right ventricle is normal in size and function.  No hemodynamically significant valvular disease.  There is no pericardial effusion.  The inferior vena cava was normal in size with preserved respiratory  variability.     Technically difficult study.  ______________________________________________________________________________  Left Ventricle  The left ventricle is normal in size. There is mild to moderate concentric  left ventricular hypertrophy. The visual ejection fraction is 55-60%.  Diastolic Doppler findings (E/E' ratio and/or other parameters) suggest left  ventricular filling pressures are indeterminate.     Right Ventricle  The right ventricle is normal in size and function.     Atria  Normal left atrial size. Right atrial size is normal.     Mitral Valve  The mitral valve is normal in structure and function. There is trace mitral  regurgitation.     Tricuspid Valve  There is trace tricuspid regurgitation.     Aortic Valve  The aortic valve is trileaflet with aortic valve sclerosis. No hemodynamically  significant valvular aortic stenosis.     Pulmonic Valve  The pulmonic valve is not well visualized.     Vessels  The aortic root is normal size. The inferior vena cava was normal in size with  preserved respiratory variability.     Pericardium  There is no pericardial effusion.     ______________________________________________________________________________  MMode/2D Measurements & Calculations  IVSd: 1.4 cm  LVIDd: 3.1 cm  LVIDs: 1.5 cm  LVPWd: 1.4 cm  FS: 51.6 %     LV mass(C)d: 146.7 grams  LV mass(C)dI: 81.4 grams/m2  Ao root diam: 2.8 cm  asc Aorta Diam: 2.8 cm  LVOT diam: 2.0 cm  LVOT area: 3.1 cm2  Ao root diam index Ht(cm/m):  1.8  Ao root diam index BSA (cm/m2): 1.6  Asc Ao diam index BSA (cm/m2): 1.6  Asc Ao diam index Ht(cm/m): 1.8  LA Volume (BP): 29.6 ml  LA Volume Index (BP): 16.4 ml/m2     RWT: 0.90  TAPSE: 1.8 cm     Doppler Measurements & Calculations  MV E max javier: 53.3 cm/sec  MV A max javier: 91.9 cm/sec  MV E/A: 0.58  MV dec slope: 167.0 cm/sec2  MV dec time: 0.32 sec  PA acc time: 0.07 sec  E/E' av.2  Lateral E/e': 11.7  Medial E/e': 12.6  RV S Javier: 9.4 cm/sec     ______________________________________________________________________________  Report approved by: Ferny Mckinney MD on 2025 04:10 PM

## 2025-07-23 ENCOUNTER — HOSPITAL ENCOUNTER (INPATIENT)
Facility: CLINIC | Age: 70
DRG: 057 | End: 2025-07-23
Attending: PHYSICAL MEDICINE & REHABILITATION | Admitting: STUDENT IN AN ORGANIZED HEALTH CARE EDUCATION/TRAINING PROGRAM
Payer: COMMERCIAL

## 2025-07-23 VITALS
HEART RATE: 68 BPM | TEMPERATURE: 97.6 F | RESPIRATION RATE: 16 BRPM | DIASTOLIC BLOOD PRESSURE: 64 MMHG | BODY MASS INDEX: 32.13 KG/M2 | HEIGHT: 62 IN | SYSTOLIC BLOOD PRESSURE: 149 MMHG | OXYGEN SATURATION: 97 % | WEIGHT: 174.6 LBS

## 2025-07-23 DIAGNOSIS — Z79.4 TYPE 2 DIABETES MELLITUS WITH HYPERGLYCEMIA, WITH LONG-TERM CURRENT USE OF INSULIN (H): Primary | ICD-10-CM

## 2025-07-23 DIAGNOSIS — I63.9 CEREBROVASCULAR ACCIDENT (CVA), UNSPECIFIED MECHANISM (H): ICD-10-CM

## 2025-07-23 DIAGNOSIS — I63.9 STROKE, SMALL VESSEL (H): ICD-10-CM

## 2025-07-23 DIAGNOSIS — E11.65 TYPE 2 DIABETES MELLITUS WITH HYPERGLYCEMIA, WITH LONG-TERM CURRENT USE OF INSULIN (H): Primary | ICD-10-CM

## 2025-07-23 LAB
ANION GAP SERPL CALCULATED.3IONS-SCNC: 12 MMOL/L (ref 7–15)
BUN SERPL-MCNC: 42 MG/DL (ref 8–23)
CALCIUM SERPL-MCNC: 9.6 MG/DL (ref 8.8–10.4)
CHLORIDE SERPL-SCNC: 106 MMOL/L (ref 98–107)
CREAT SERPL-MCNC: 1.83 MG/DL (ref 0.51–0.95)
EGFRCR SERPLBLD CKD-EPI 2021: 29 ML/MIN/1.73M2
ERYTHROCYTE [DISTWIDTH] IN BLOOD BY AUTOMATED COUNT: 12.6 % (ref 10–15)
GLUCOSE BLDC GLUCOMTR-MCNC: 196 MG/DL (ref 70–99)
GLUCOSE BLDC GLUCOMTR-MCNC: 223 MG/DL (ref 70–99)
GLUCOSE BLDC GLUCOMTR-MCNC: 242 MG/DL (ref 70–99)
GLUCOSE BLDC GLUCOMTR-MCNC: 290 MG/DL (ref 70–99)
GLUCOSE BLDC GLUCOMTR-MCNC: 315 MG/DL (ref 70–99)
GLUCOSE BLDC GLUCOMTR-MCNC: 325 MG/DL (ref 70–99)
GLUCOSE SERPL-MCNC: 209 MG/DL (ref 70–99)
HCO3 SERPL-SCNC: 23 MMOL/L (ref 22–29)
HCT VFR BLD AUTO: 46.4 % (ref 35–47)
HGB BLD-MCNC: 15.5 G/DL (ref 11.7–15.7)
MCH RBC QN AUTO: 30.1 PG (ref 26.5–33)
MCHC RBC AUTO-ENTMCNC: 33.4 G/DL (ref 31.5–36.5)
MCV RBC AUTO: 90 FL (ref 78–100)
PLATELET # BLD AUTO: 128 10E3/UL (ref 150–450)
POTASSIUM SERPL-SCNC: 3.6 MMOL/L (ref 3.4–5.3)
RBC # BLD AUTO: 5.15 10E6/UL (ref 3.8–5.2)
SODIUM SERPL-SCNC: 141 MMOL/L (ref 135–145)
WBC # BLD AUTO: 5.6 10E3/UL (ref 4–11)

## 2025-07-23 PROCEDURE — 80048 BASIC METABOLIC PNL TOTAL CA: CPT

## 2025-07-23 PROCEDURE — 250N000012 HC RX MED GY IP 250 OP 636 PS 637: Performed by: PHYSICIAN ASSISTANT

## 2025-07-23 PROCEDURE — 99239 HOSP IP/OBS DSCHRG MGMT >30: CPT | Performed by: INTERNAL MEDICINE

## 2025-07-23 PROCEDURE — 250N000013 HC RX MED GY IP 250 OP 250 PS 637: Performed by: INTERNAL MEDICINE

## 2025-07-23 PROCEDURE — 99221 1ST HOSP IP/OBS SF/LOW 40: CPT | Mod: AI | Performed by: STUDENT IN AN ORGANIZED HEALTH CARE EDUCATION/TRAINING PROGRAM

## 2025-07-23 PROCEDURE — 250N000013 HC RX MED GY IP 250 OP 250 PS 637: Performed by: NURSE PRACTITIONER

## 2025-07-23 PROCEDURE — 250N000013 HC RX MED GY IP 250 OP 250 PS 637: Performed by: HOSPITALIST

## 2025-07-23 PROCEDURE — 128N000003 HC R&B REHAB

## 2025-07-23 PROCEDURE — 250N000011 HC RX IP 250 OP 636: Performed by: PHYSICIAN ASSISTANT

## 2025-07-23 PROCEDURE — 250N000013 HC RX MED GY IP 250 OP 250 PS 637: Performed by: PHYSICIAN ASSISTANT

## 2025-07-23 PROCEDURE — 36415 COLL VENOUS BLD VENIPUNCTURE: CPT

## 2025-07-23 PROCEDURE — 85041 AUTOMATED RBC COUNT: CPT

## 2025-07-23 RX ORDER — ATORVASTATIN CALCIUM 20 MG/1
20 TABLET, FILM COATED ORAL EVERY EVENING
Status: ON HOLD | DISCHARGE
Start: 2025-07-23 | End: 2025-07-30

## 2025-07-23 RX ORDER — NICOTINE POLACRILEX 4 MG
15-30 LOZENGE BUCCAL
Status: DISCONTINUED | OUTPATIENT
Start: 2025-07-23 | End: 2025-07-31 | Stop reason: HOSPADM

## 2025-07-23 RX ORDER — CARVEDILOL 25 MG/1
25 TABLET ORAL 2 TIMES DAILY WITH MEALS
Status: DISCONTINUED | OUTPATIENT
Start: 2025-07-23 | End: 2025-07-27

## 2025-07-23 RX ORDER — POLYETHYLENE GLYCOL 3350 17 G/17G
17 POWDER, FOR SOLUTION ORAL DAILY PRN
Status: DISCONTINUED | OUTPATIENT
Start: 2025-07-23 | End: 2025-07-31 | Stop reason: HOSPADM

## 2025-07-23 RX ORDER — HYDRALAZINE HYDROCHLORIDE 50 MG/1
50 TABLET, FILM COATED ORAL 3 TIMES DAILY
Status: DISCONTINUED | OUTPATIENT
Start: 2025-07-23 | End: 2025-07-25

## 2025-07-23 RX ORDER — ACETAMINOPHEN 325 MG/1
650 TABLET ORAL EVERY 4 HOURS PRN
Status: DISCONTINUED | OUTPATIENT
Start: 2025-07-23 | End: 2025-07-31 | Stop reason: HOSPADM

## 2025-07-23 RX ORDER — ENOXAPARIN SODIUM 100 MG/ML
30 INJECTION SUBCUTANEOUS EVERY 24 HOURS
Status: DISCONTINUED | OUTPATIENT
Start: 2025-07-23 | End: 2025-07-29

## 2025-07-23 RX ORDER — DEXTROSE MONOHYDRATE 25 G/50ML
25-50 INJECTION, SOLUTION INTRAVENOUS
Status: DISCONTINUED | OUTPATIENT
Start: 2025-07-23 | End: 2025-07-31 | Stop reason: HOSPADM

## 2025-07-23 RX ORDER — FUROSEMIDE 40 MG/1
40 TABLET ORAL DAILY
Status: DISCONTINUED | OUTPATIENT
Start: 2025-07-23 | End: 2025-07-23 | Stop reason: HOSPADM

## 2025-07-23 RX ORDER — INSULIN ASPART 100 [IU]/ML
1-15 INJECTION, SOLUTION INTRAVENOUS; SUBCUTANEOUS
Status: ON HOLD | DISCHARGE
Start: 2025-07-23 | End: 2025-07-30

## 2025-07-23 RX ORDER — HYDRALAZINE HYDROCHLORIDE 10 MG/1
10 TABLET, FILM COATED ORAL 4 TIMES DAILY PRN
Status: DISCONTINUED | OUTPATIENT
Start: 2025-07-23 | End: 2025-07-31 | Stop reason: HOSPADM

## 2025-07-23 RX ORDER — FUROSEMIDE 40 MG/1
40 TABLET ORAL DAILY
Status: DISCONTINUED | OUTPATIENT
Start: 2025-07-24 | End: 2025-07-31 | Stop reason: HOSPADM

## 2025-07-23 RX ORDER — INSULIN GLARGINE 100 [IU]/ML
12 INJECTION, SOLUTION SUBCUTANEOUS AT BEDTIME
Status: ON HOLD | DISCHARGE
Start: 2025-07-23 | End: 2025-07-30

## 2025-07-23 RX ORDER — BISACODYL 10 MG
10 SUPPOSITORY, RECTAL RECTAL DAILY PRN
Status: DISCONTINUED | OUTPATIENT
Start: 2025-07-23 | End: 2025-07-31 | Stop reason: HOSPADM

## 2025-07-23 RX ORDER — CLOPIDOGREL BISULFATE 75 MG/1
75 TABLET ORAL DAILY
Status: ON HOLD | DISCHARGE
Start: 2025-07-24 | End: 2025-07-30

## 2025-07-23 RX ORDER — AMOXICILLIN 250 MG
1 CAPSULE ORAL 2 TIMES DAILY PRN
Status: DISCONTINUED | OUTPATIENT
Start: 2025-07-23 | End: 2025-07-31 | Stop reason: HOSPADM

## 2025-07-23 RX ORDER — ATORVASTATIN CALCIUM 10 MG/1
20 TABLET, FILM COATED ORAL EVERY EVENING
Status: DISCONTINUED | OUTPATIENT
Start: 2025-07-23 | End: 2025-07-31 | Stop reason: HOSPADM

## 2025-07-23 RX ORDER — CARVEDILOL 25 MG/1
25 TABLET ORAL 2 TIMES DAILY WITH MEALS
Status: DISCONTINUED | OUTPATIENT
Start: 2025-07-23 | End: 2025-07-23 | Stop reason: HOSPADM

## 2025-07-23 RX ORDER — CLOPIDOGREL BISULFATE 75 MG/1
75 TABLET ORAL DAILY
Status: DISCONTINUED | OUTPATIENT
Start: 2025-07-24 | End: 2025-07-31 | Stop reason: HOSPADM

## 2025-07-23 RX ORDER — VITAMIN B COMPLEX
50 TABLET ORAL DAILY
Status: DISCONTINUED | OUTPATIENT
Start: 2025-07-24 | End: 2025-07-31 | Stop reason: HOSPADM

## 2025-07-23 RX ORDER — HYDRALAZINE HYDROCHLORIDE 50 MG/1
50 TABLET, FILM COATED ORAL 3 TIMES DAILY
Status: DISCONTINUED | OUTPATIENT
Start: 2025-07-23 | End: 2025-07-23 | Stop reason: HOSPADM

## 2025-07-23 RX ORDER — ASPIRIN 81 MG/1
81 TABLET ORAL DAILY
Status: DISCONTINUED | OUTPATIENT
Start: 2025-07-24 | End: 2025-07-31 | Stop reason: HOSPADM

## 2025-07-23 RX ADMIN — INSULIN GLARGINE 12 UNITS: 100 INJECTION, SOLUTION SUBCUTANEOUS at 22:53

## 2025-07-23 RX ADMIN — CLOPIDOGREL BISULFATE 75 MG: 75 TABLET, FILM COATED ORAL at 09:31

## 2025-07-23 RX ADMIN — FUROSEMIDE 40 MG: 40 TABLET ORAL at 09:33

## 2025-07-23 RX ADMIN — HYDRALAZINE HYDROCHLORIDE 50 MG: 50 TABLET ORAL at 14:48

## 2025-07-23 RX ADMIN — ENOXAPARIN SODIUM 30 MG: 30 INJECTION SUBCUTANEOUS at 16:08

## 2025-07-23 RX ADMIN — CARVEDILOL 25 MG: 25 TABLET, FILM COATED ORAL at 17:38

## 2025-07-23 RX ADMIN — HYDRALAZINE HYDROCHLORIDE 50 MG: 50 TABLET ORAL at 19:15

## 2025-07-23 RX ADMIN — CARVEDILOL 25 MG: 25 TABLET, FILM COATED ORAL at 09:31

## 2025-07-23 RX ADMIN — ASPIRIN 81 MG CHEWABLE TABLET 81 MG: 81 TABLET CHEWABLE at 09:33

## 2025-07-23 RX ADMIN — HYDRALAZINE HYDROCHLORIDE 10 MG: 10 TABLET ORAL at 22:53

## 2025-07-23 RX ADMIN — HYDRALAZINE HYDROCHLORIDE 50 MG: 50 TABLET ORAL at 09:33

## 2025-07-23 RX ADMIN — ATORVASTATIN CALCIUM 20 MG: 10 TABLET, FILM COATED ORAL at 20:49

## 2025-07-23 ASSESSMENT — ACTIVITIES OF DAILY LIVING (ADL)
ADLS_ACUITY_SCORE: 53
ADLS_ACUITY_SCORE: 55
ADLS_ACUITY_SCORE: 58
ADLS_ACUITY_SCORE: 53
ADLS_ACUITY_SCORE: 55
ADLS_ACUITY_SCORE: 58
ADLS_ACUITY_SCORE: 55
ADLS_ACUITY_SCORE: 58
ADLS_ACUITY_SCORE: 38
ADLS_ACUITY_SCORE: 38
ADLS_ACUITY_SCORE: 55
ADLS_ACUITY_SCORE: 55
ADLS_ACUITY_SCORE: 58
ADLS_ACUITY_SCORE: 55
ADLS_ACUITY_SCORE: 55
ADLS_ACUITY_SCORE: 53
ADLS_ACUITY_SCORE: 55
ADLS_ACUITY_SCORE: 55
ADLS_ACUITY_SCORE: 58
ADLS_ACUITY_SCORE: 38
ADLS_ACUITY_SCORE: 55
ADLS_ACUITY_SCORE: 58

## 2025-07-23 NOTE — H&P
[facility]   Acute Rehabilitation Unit  Admission History and Physical    CHIEF COMPLAINT   Stroke Ischemic 01.2 (R) Body Involvement (L) Brain; Acute to subacute left internal capsule ischemic stroke, etiology likely small vessel disease vs ESUS      HISTORY OF PRESENT ILLNESS  Judi Moore is a 69 year old right hand dominant female with past medical history of HTN, T2DM, HLD, CKD4, chronic lacunar infarcts of bilateral cerebellar hemispheres (on 5/2023 MRI), VANESSA, and OA s/p R TKA (2022) who presented on 7/19/25 with several day history of dizziness, ataxia, right-sided weakness, and elevated BP (despite compliance with medications).  CT scan of the head did not show any acute intracranial pathology.  CTA was deferred given impaired kidney function.  She was not a candidate for tenecteplase or thrombectomy given symptoms starting multiple days prior to admission.  MRI brain showed acute to subacute left internal capsule ischemic stroke. MRA head/neck with no large vessel occlusion, 50 to 60%, right ICA stenosis.     Additional stroke evaluation with echo with EF 55-60%, mild to moderate LVH; EKG with sinus rhythm; LDL 80; A1c 7.7%.  Stroke etiology felt to be small vessel disease.  Dual antiplatelet therapy with aspirin and Plavix was recommended for 21 days, followed by aspirin 81 mg daily ongoing.  Her prior to admission statin dose was increased.  Her anti-hypertensive medications were gradually resumed and insulin was adjusted.  However, she continued to have elevated blood pressure and blood glucose readings even upon transfer to ARU.    Hospital course was further complicated by hyperglycemia, elevated BP, polycythemia (hematology consulted), and thrombocytopenia.    During acute hospitalization, patient was seen and evaluated by PT and OT, who collectively recommended that patient would benefit from ongoing therapies in the acute inpatient rehabilitation  setting.     At baseline, she's had chronic high blood pressure managed with losartan, carvedilol, and hydralazine for multiple years with SBP around 130-140.  She notes she also had consistent control of her blood sugars as well. This all changed 3 days prior to admission, which confuses her since she endorses being compliant with her medications.    During hospitalization, she has noticeable balance issues with standing and generalized weakness on the left side.  Her urinary incontinence and urgency have worsened since the stroke. Her baseline bowel movement frequency is 1-3 per day, and it's been several days she's had a bowel movement since the stroke, but with no constipation.    In review of the therapy notes, she currently transfers with Min A; ambulates with WW and up to Min A with cues for decreased step length and foot clearance on RLE. 8 reps of step ups onto single stair with bilateral rail use, Ramila, cues for sequencing. Pt demonstrates decreased eccentric control highlighting LE weakness. Pt demonstrates R knee buckling with prolonged standing tasks. OT notes impaired grasp/release/coordination/manipulation with R hand. Overall Min-Mod A needed for basic ADL tasks due to R hemiplegia. OT notes pt has no overt cognitive deficits, however this has not been formally assessed. SLP evaluated and signed off with pt tolerating a regular diet; screen for any newly identified SLP needs.     Upon arrival to the rehab unit, she feels comfortable with no specific complaints.    PAST MEDICAL HISTORY   Reviewed and updated in Epic.  Past Medical History:   Diagnosis Date    Anemia     Cataracts, bilateral     Chronic bilateral back pain     without sciatica    Chronic pain     right knee    CKD (chronic kidney disease)     stage 3    Dermatitis     Diabetic macular edema (H)     Diabetic neuropathy (H)     DM (diabetes mellitus) (H)     Essential hypertension     Gastroenteritis     Hypercholesterolemia      Hyperparathyroidism     Hypertension     Knee pain, right     Medial meniscus tear     right knee    Nuclear sclerosis of both eyes     Obesity (BMI 30.0-34.9)     VANESSA (obstructive sleep apnea)     moderate    Peripheral edema     Pneumonia     Posterior vitreous detachment     both eyes    PPD positive     Retinopathy     Right carpal tunnel syndrome     Thrombocytopenia        SURGICAL HISTORY  Reviewed and updated in Epic.  Past Surgical History:   Procedure Laterality Date    ABDOMEN SURGERY      appy    ARTHROPLASTY KNEE Right 1/17/2022    Procedure: RIGHT TOTAL KNEE ARTHROPLASTY;  Surgeon: Colt Ochoa MD;  Location:  OR       SOCIAL HISTORY  Reviewed and updated in Epic.  Marital Status:   Living situation: lives with spouse in house with 4 JOSE LUIS  Family support: spouse, adult [place] and another who lives out of town   Social History     Socioeconomic History    Marital status:      Spouse name: Not on file    Number of children: Not on file    Years of education: Not on file    Highest education level: Not on file   Occupational History    Not on file   Tobacco Use    Smoking status: Never    Smokeless tobacco: Never   Vaping Use    Vaping status: Not on file   Substance and Sexual Activity    Alcohol use: Never    Drug use: Never    Sexual activity: Not on file   Other Topics Concern    Parent/sibling w/ CABG, MI or angioplasty before 65F 55M? Not Asked   Social History Narrative    Not on file     Social Drivers of Health     Financial Resource Strain: Not on file   Food Insecurity: Not on file   Transportation Needs: Not on file   Physical Activity: Not on file   Stress: Not on file   Social Connections: Not on file   Interpersonal Safety: Low Risk  (7/22/2025)    Interpersonal Safety     Do you feel physically and emotionally safe where you currently live?: Yes     Within the past 12 months, have you been hit, slapped, kicked or otherwise physically hurt by  someone?: No     Within the past 12 months, have you been humiliated or emotionally abused in other ways by your partner or ex-partner?: No   Housing Stability: Not on file       FAMILY HISTORY  Reviewed and updated in Epic.  No family history on file.      PRIOR FUNCTIONAL HISTORY   Pt was modified independent with all ADLs/IADLs, transfers, mobility and gait with cane.  Her spouse does driving and they order groceries from [employer].    MEDICATIONS  Scheduled meds  Medications Prior to Admission   Medication Sig Dispense Refill Last Dose/Taking    acetaminophen (TYLENOL) 325 MG tablet Take 2 tablets (650 mg) by mouth every 4 hours as needed for other (For optimal non-opioid multimodal pain management to improve pain control.)  0     aspirin 81 MG EC tablet Take 1 tablet (81 mg) by mouth daily  0     atorvastatin (LIPITOR) 20 MG tablet Take 1 tablet (20 mg) by mouth every evening.       carvedilol (COREG) 12.5 MG tablet Take 12.5 mg by mouth 2 times daily (with meals). Take with carvedilol 25 mg BID for a total of 37.5 mg BID.       carvedilol (COREG) 25 MG tablet Take 25 mg by mouth 2 times daily (with meals)       [START ON 7/24/2025] clopidogrel (PLAVIX) 75 MG tablet Take 1 tablet (75 mg) by mouth daily for 21 days.       furosemide (LASIX) 40 MG tablet Take 40 mg by mouth daily       hydrALAZINE (APRESOLINE) 25 MG tablet Take 25 mg by mouth 3 times daily.       hydrALAZINE (APRESOLINE) 50 MG tablet Take 50 mg by mouth 3 times daily. Take with hydralazine 25 mg TID for a total of 75 mg TID.       insulin aspart (NOVOLOG FLEXPEN) 100 UNIT/ML pen Inject 1-15 Units subcutaneously 3 times daily (with meals). Take 1 units of insulin with 10 gram of carb with each meal       insulin glargine 100 UNIT/ML pen Inject 12 Units subcutaneously at bedtime.       losartan (COZAAR) 100 MG tablet Take 100 mg by mouth daily       vitamin D3 (VITAMIN D3) 50 mcg (2000 units) tablet Take 1 tablet by mouth daily      "      ALLERGIES     Allergies   Allergen Reactions    Oxycodone-Acetaminophen      lightheadedness    Tramadol      lightheadedness    Phenothiazines Rash     Pt unsure of this     PHYSICAL EXAM  VITAL SIGNS:  BP (!) 176/67   Pulse 68   Temp 97.3  F (36.3  C) (Oral)   Resp 16   Wt 83.6 kg (184 lb 4.9 oz)   SpO2 96%   BMI 33.71 kg/m    BMI:  Estimated body mass index is 33.71 kg/m  as calculated from the following:    Height as of 7/21/25: 1.575 m (5' 2\").    Weight as of this encounter: 83.6 kg (184 lb 4.9 oz).     General: NAD, lying in bed  HEENT: NC/AT, MMM  Pulmonary: non-labored on room air, lungs CTA bilaterally  Cardiovascular: RRR, no murmurs appreciated  Abdominal: soft, non-tender, non-distended, bowel sounds present  Extremities: warm, well perfused, no edema in bilateral lower extremities, no tenderness in calves  MSK/neuro:   Mental Status:  alert and oriented x3   Cranial Nerves:   2nd CN: Pupils equal, round, reactive to light and visual fields intact to confrontation.   3rd,4th,6th CN:  EOMI  5th CN: facial sensation intact   7th CN: face symmetrical   8th CN: functional hearing bilaterally  9th, 10th CN: palate elevates symmetrically   11th CN: sternocleidomastoids and trapezii strong   12th CN: tongue midline and without fasciculations     Sensory: Normal to light touch in bilateral upper and lower extremities    Strength:    SF  EF  EE  WE  G  I  HF  KE  DF  EHL  PF   R  4 4 4 4 5 5 4 5 5 5 5  L  4  5 5 5 5 5 5 5 5 5 5   Reflexes: Present and symmetrical   Martinez's test: negative bilaterally   Tone per modified Faisal Scale: None   Abnormal movements: None   Speech: Per her family, no significant speech dysarthria, no aphasia   Cognition: Goal oriented and appropriate    LABS  CBC RESULTS:   Recent Labs   Lab Test 07/23/25  0855 07/21/25  1100 07/20/25  0802   WBC 5.6 6.0 6.0   RBC 5.15 5.32* 5.45*   HGB 15.5 15.8* 16.2*   HCT 46.4 47.5* 48.7*   MCV 90 89 89   MCH 30.1 29.7 29.7   MCHC " 33.4 33.3 33.3   RDW 12.6 12.6 12.6   * 124* 146*       Last Basic Metabolic Panel:  Recent Labs   Lab Test 07/23/25  1557 07/23/25  1440 07/23/25  1149 07/23/25  0855 07/21/25  2145 07/21/25  1914 07/21/25  1314 07/21/25  1100   NA  --   --   --  141  --  135  --  138   POTASSIUM  --   --   --  3.6  --  4.3  --  4.0   CHLORIDE  --   --   --  106  --  100  --  100   CO2  --   --   --  23  --  23  --  20*   ANIONGAP  --   --   --  12  --  12  --  18*   * 315* 242* 209*   < > 335*   < > 396*   BUN  --   --   --  42.0*  --  38.6*  --  35.1*   CR  --   --   --  1.83*  --  1.66*  --  1.85*   GFRESTIMATED  --   --   --  29*  --  33*  --  29*   JURGEN  --   --   --  9.6  --  9.4  --  9.5    < > = values in this interval not displayed.       Recent Labs   Lab Test 07/19/25  2308   CHOL 154   HDL 64   LDL 80   TRIG 51     Hemoglobin A1C   Date Value Ref Range Status   07/19/2025 7.7 (H) <5.7 % Final     Comment:     Normal <5.7%   Prediabetes 5.7-6.4%    Diabetes 6.5% or higher     Note: Adopted from ADA consensus guidelines.         IMPRESSION/PLAN:  Judi Moore is a 69 year old right hand dominant female with a past medical history of HTN, T2DM, HLD, CKD4, chronic lacunar infarcts of bilateral cerebellar hemispheres (on 5/2023 MRI), VANESSA, and OA s/p R TKA (2022) who was admitted on 7/19/25 with acute to subacute left internal capsule ischemic stroke with hospital course complicated by hyperglycemia, elevated BP, polycythemia, and thrombocytopenia.  She is now admitted to ARU on 07/23/25 for multidisciplinary rehabilitation and ongoing medical management.      Admission to acute inpatient rehab 07/23/25.    Impairment group code: Stroke Ischemic 01.2 (R) Body Involvement (L) Brain; Acute to subacute left internal capsule ischemic stroke, etiology likely small vessel disease vs ESUS       PT and OT 90 minutes of each daily for 6 days per week for 10 days, in addition to rehab nursing and close management of  physiatrist.      Impairment of ADL's: Noted to have L hemiplegia, facial weakness, impaired activity tolerance, impaired balance, impaired coordination, impaired sensation, and impaired weight shifting, all affecting her ability to safely and independently perform basic ADLs.  Goal for mod I with basic ADLs with exception of SBA for bathing.    Impairment of mobility:  Noted to have L hemiplegia, facial weakness, impaired activity tolerance, impaired balance, impaired coordination, impaired sensation, and impaired weight shifting, all affecting her ability to safely and independently perform basic mobility.  Goal for mod I with basic mobility with exception of SBA for stairs.    Medical Conditions    Acute to subacute left internal capsule ischemic stroke, etiology likely small vessel disease   History of prior cerebellar stroke (MRI 2023)  - SBP goal <130/80 long-term.  Management as below  - Continue DAPT with ASA 81 mg daily + Plavix 75 mg daily x21 days, followed by ASA 81 mg daily  - LDL goal 40-70, management as below  - Goal A1c<7, management as below  - Continue PT, OT, SLP  - Follow up with stroke RAY in 6-8 weeks    Hypertension  PTA on Coreg 37.5 mg BID, Lasix 40 mg daily, hydralazine 75 mg TID, losartan 100 mg daily  Elevated BP on arrival, initially allowed permissive hypertension but home meds gradually resumed.  BP elevated on ARU admission, but also note that inpatient team may multiple dose adjustments just today, so make take some time to take effect.  - Hospitalist consulted for co-management, appreciate assistance  - BP goal <180 for acute phase; long-term goal <130/80.   - Continue Coreg 25 mg BID, increased on 7/23.  Continue to increase back to PTA dose as indicated/tolerated  - Continue PTA Lasix 40 mg daily (resumed on 7/23)  - Continue hydralazine 50 mg TID (resumed on 7/23).  Continue to increase back to PTA dose as indicated/tolerated.  - Still holding PTA Losartan, resume as  indicated/tolerated  - Hydralazine 10 mg QID PRN for SBP >180  - Monitor BP, adjust regimen as indicated in partnership with medicine team    Hyperlipidemia  PTA on atorvastatin 10 mg daily, increased this admission with LDL 80  - Continue atorvastatin 20 mg daily    Type II diabetes mellitus  PTA on Novolog sliding scale insulin TID AC, carb coverage (1:13 breakfast, 1:20 lunch, 1:7 dinner), and glargine 10 units at HS  A1c 7.7%  BG quite elevated this admission, frequently into the 300s.  Seems patient has been resistant to recommendations to increase long-acting insulin dose despite requiring very high doses of short-acting insulin, poorly controlled BG, education on 50:50 basal/bolus recommendations.  Was agreeable to small increase just prior to discharge to ARU.  - Hospitalist consulted for co-management, appreciate assistance  - Monitor BG TID AC + HS + 0200  - Continue Lantus 12 units at HS (increased 7/23)  - Continue Novolog high intensity sliding scale insulin TID AC + HS  - Continue Novolog 1:10 g CHO TID AC  - Moderate consistent carb diet  - Hypoglycemia protocol  - Consider endocrinology consult if BG remains poorly controlled    CKD IV  Secondary hyperparathyroidism   Follows with [facility].  Suspected 2/2 diabetic nephropathy +/- HTN nephrosclerosis.  Baseline Cr ~1.6-2.0.  During this admission, stable in 1.6-1.8 range.  - Avoid/limit nephrotoxins as able  - Repeat BMP tomorrow, then ongoing monitoring pending trend    VANESSA  Does not tolerate CPAP  - Nocturnal hypoxia may contribute to vascular risk--consider alternate therapy in follow-up.     Polycythemia  Incidental finding.  Hgb 17.0 on admission.  Evaluated by hematology during this admission and additional labs were sent.  EPO WNL.  Iron, ferritin, sat index WNL; IBC low.  Peripheral smear unremarkable for any abnormal cells.  Hgb nearly WNL by discharge to ARU, sending team notes possible dehydration at admission  contributing.  - JAK2 mutation labs pending.  Per hematology, results likely to take several weeks.  If positive, they will start on hydroxyurea for polycythemia vera  - Repeat CBC tomorrow, then ongoing monitoring pending trend  - Follow up with hematology     Thrombocytopenia  Platelets low throughout admission, stable at 128 on 7/23  - Repeat CBC tomorrow, then ongoing monitoring pending trend    Adjustment to disability:  Clinical psychology to eval and treat if indicated  FEN: mod consistent CHO diet, regular texture, thin liquids  Bowel: continent with some functional constipation.  Has PRN bowel meds available.  Monitor and adjust regimen as indicated.  Bladder: mixed continence.  Monitor PVRs at admission  DVT Prophylaxis: subcutaneous Lovenox  GI Prophylaxis: none, consider adding PPI given age + DAPT + Lovenox  Code: full; confirmed on admission  Disposition: goal for home  ELOS:  12 days  Rehab prognosis: good  Follow up Appointments on Discharge: PCP in 1-2 weeks, neurology with stroke RAY in 6-8 weeks, hematology, PM&R in 8-12 weeks  Note was partially prepared by Carol Hayes PA-C, Physical Medicine & Rehabilitation, who did not personally examine patient on this date.  Alberto Gutierrez MD  I spent a total of 45 minutes on the date of service doing chart review, history and exam, documentation, and further activities as noted above.

## 2025-07-23 NOTE — PLAN OF CARE
Pt here with L internal capsule ischemic stroke. A&O x4. Neuros R sided weakness, slight R droop, generalized weakness. VSS on RA. Mod carb diet, thin liquids. Takes pills whole. Up with AO1 GBW. Denies pain. Pt scoring green on the Aggression Stop Light Tool. Discharge info provided to patient and pts family. Discharged to ARU via wheelchair ride.

## 2025-07-23 NOTE — DISCHARGE SUMMARY
[facility]    Discharge Summary  Hospitalist    Date of Admission:  7/19/2025  Date of Discharge:  7/23/2025  Discharging Provider: Manuel Waldrop MD, MD  Date of Service (when I saw the patient): 07/23/25    Discharge Diagnoses   Please refer below    History of Present Illness   Judi Moore is an 69 year old female who presented with difficulty in walking, dizziness right upper extremity weakness.    Hospital Course   Judi Moore 69-year-old female with history of type 2 diabetes, hypertension, dyslipidemia, sleep apnea, and CKD stage prior cerebellar stroke, IV presented with 1-day history of right upper extremity weakness,, ataxia, and difficulty ambulating, in the context of persistent severe home hypertension (SBP >200s) despite medication compliance.    Final discharge diagnosis Hospital course      Acute to subacute left internal capsule ischemic stroke: Likely secondary to small vessel disease    This is a 69-year-old female with multiple comorbidities as above, presented with uncontrolled hypertension,  Difficulty in ambulation, dizziness and right upper extremity weakness.  CT scan of the head, shows no acute intracranial process.  MRI brain (7/20): small acute to early subacute infarct in left internal capsule; chronic ischemic changes.  MRA head: normal [place]; MRA neck: 60-70% right ICA narrowing.  Echo: normal EF, concentric mild to moderate LVH, no significant valvular pathology.  Initially permissive hypertension, blood pressure goal goal <180 for acute phase; long-term goal <130/80.  Patient was loaded with Plavix, start on Plavix and 5 mg daily and continue aspirin 81 mg daily.  She needs to be on dual antiplatelet medication for at least 3 weeks.  Increase Lipitor from 10 mg to 20 mg daily.  Neurology follow-up scheduled in 6-8 weeks     At the time of discharge overall feeling well no headache dizziness lightheadedness no fever chills cough no  dysuria materia constipation diarrhea.  She is now at the 72 hours post stroke, will resume her home PTA antihypertensive medication to better control blood pressure.  Resume Coreg, hydralazine, losartan and Lasix, continue the same PTA doses.  She takes 37.5 mg Coreg twice daily, hydralazine 75 mg 3 times daily, losartan 100 mg daily and 40 of Lasix.         Polycythemia: Resolved  Initially hemoglobin was elevated 17.0, with hematocrit of 51  That trigger hematology consult, for possible polycythemia vera.  Hematology has evaluate the patient and send JAK2 mutation which is pending at this time  I think the patient was dehydrated at the time of admission, and with IV fluids her  Hemoglobin improved to normal from 17 g on admission to 15.5 On discharge.  Hematocrit normal 46.4 on day of discharge.  She will follow-up with outpatient with hematology, GI to mentation next generation sequencing  Will be available in the next 2 to 3 weeks  Peripheral smear unremarkable for any abnormal cells..  Continue aspirin and Plavix as above     Type 2 Diabetes Mellitus (on long-term insulin therapy)  A1c 7.7% (June 2025); currently elevated glucose values >300 on average.  On diabetic diet with carb counting (1:15) and sliding scale insulin.  Patient blood sugar has been elevated.  Although improved somewhat today.  She uses a lot of short acting insulin, and only 10 units of Lantus insulin.  At this time I will increase the Lantus to 12 units, and recommend to keep increasing 2 units every day if the blood sugar in the morning is more than 160, target fasting morning blood sugar 100-140.  Continue with the Humalog 38 units with the meal in the morning and afternoon, 32 units With dinner.  Keep on sliding scale insulin for correction hypoglycemia protocol.       Hypertension  On initial admission she was on permissive hypertension and that the blood pressure was high  Now we can control the blood pressure better  I will resume  "her home medication which she is on good dosages prior to discharge.  She is on Coreg 37.5 mg twice daily, hydralazine 75 mg 3 times daily, and losartan 100 mg daily  She takes Lasix 40 mg daily as well we will continue with that.  Her target BP is less than 130 x 80 at this time.      Dyslipidemia  Atorvastatin increased to 20 mg daily in context of recent ischemic stroke.  Long-term goal LDL <70 given vascular risk.  Current LDL is 80    Obstructive Sleep Apnea  CPAP-intolerant per patient report.  Nocturnal hypoxia may contribute to vascular risk--consider alternate therapy in follow-up.    Chronic Kidney Disease Stage IV with Secondary Hyperparathyroidism  Stable renal function; managed by nephrology.  Baseline creatinine ranged from 1.6-1.9.  Currently creatinine at the baseline level.  No acute kidney injury during admission; BMP remains stable.     Clinically Significant Risk Factors                 # Thrombocytopenia: Lowest platelets = 128 in last 2 days, will monitor for bleeding             # DMII: A1C = 7.7 % (Ref range: <5.7 %) within past 6 months   # Obesity: Estimated body mass index is 31.93 kg/m  as calculated from the following:    Height as of this encounter: 1.575 m (5' 2\").    Weight as of this encounter: 79.2 kg (174 lb 9.6 oz)., PRESENT ON ADMISSION     # Financial/Environmental Concerns: none             Manuel Waldrop MD, MD    Significant Results and Procedures       Pending Results   These results will be followed up by   Unresulted Labs Ordered in the Past 30 Days of this Admission       No orders found from 6/19/2025 to 7/20/2025.            Code Status   Full Code       Primary Care Physician   Abelardo Pickard    Physical Exam   Temp: 97.8  F (36.6  C) Temp src: Oral BP: (!) 181/93 Pulse: 70   Resp: 16 SpO2: 96 % O2 Device: None (Room air)    Vitals:    07/21/25 1300   Weight: 79.2 kg (174 lb 9.6 oz)     Vital Signs with Ranges  Temp:  [97.8  F (36.6  C)-98.1  F (36.7  C)] 97.8  F (36.6 "  C)  Pulse:  [68-76] 70  Resp:  [16-18] 16  BP: (141-181)/(76-93) 181/93  SpO2:  [94 %-96 %] 96 %  I/O last 3 completed shifts:  In: 480 [P.O.:480]  Out: -     Constitutional: awake, alert, cooperative, no apparent distress, and appears stated age  Eyes: Lids and lashes normal, pupils equal, round and reactive to light, extra ocular muscles intact, sclera clear, conjunctiva normal  Respiratory: No increased work of breathing, good air exchange, clear to auscultation bilaterally, no crackles or wheezing  Cardiovascular: Normal apical impulse, regular rate and rhythm, normal S1 and S2, no S3 or S4, and no murmur noted  GI: No scars, normal bowel sounds, soft, non-distended, non-tender, no masses palpated, no hepatosplenomegally  Skin: no bruising or bleeding  Musculoskeletal: no lower extremity pitting edema present  Neurologic: Awake, alert, oriented to name, place and time.  Cranial nerves II-XII are grossly intact.    Moving all 4 extremities, no focal deficit.    Discharge Disposition   Discharged to rehabilitation facility  Condition at discharge: Stable    Consultations This Hospital Stay   NEUROLOGY IP STROKE CONSULT  HEMATOLOGY IP CONSULT  PHYSICAL THERAPY ADULT IP CONSULT  OCCUPATIONAL THERAPY ADULT IP CONSULT  SPEECH LANGUAGE PATH ADULT IP CONSULT  CARE MANAGEMENT / SOCIAL WORK IP CONSULT  VASCULAR ACCESS ADULT IP CONSULT  PHYSICAL THERAPY ADULT IP CONSULT  OCCUPATIONAL THERAPY ADULT IP CONSULT  SPEECH LANGUAGE PATH ADULT IP CONSULT    Time Spent on this Encounter   IManuel MD, personally saw the patient today and spent greater than 30 minutes discharging this patient.    Discharge Orders      Adult Oncology/Hematology  Referral      General info for SNF    Length of Stay Estimate: Short Term Care: Estimated # of Days <30  Condition at Discharge: Stable  Level of care:skilled   Rehabilitation Potential: Excellent  Admission H&P remains valid and up-to-date: Yes  Recent Chemotherapy:  N/A  Use Nursing Home Standing Orders: Yes     Mantoux instructions    Give two-step Mantoux (PPD) Per Facility Policy Yes     Follow Up and recommended labs and tests    Follow up with [facility] physician.  The following labs/tests are recommended: cbc,bmp.     Reason for your hospital stay    Acute Ischemic stroke     Glucose monitor nursing POCT    Before meals and at bedtime     Intake and output    Every shift     Daily weights    Call Provider for weight gain of more than 2 pounds per day or 5 pounds per week.     Activity - Up with assistive device     Activity - Up with nursing assistance     Full Code     Physical Therapy Adult Consult    Evaluate and treat as clinically indicated.    Reason:  stroke     Occupational Therapy Adult Consult    Evaluate and treat as clinically indicated.    Reason:   stroke     Speech Language Path Adult Consult    Evaluate and treat as clinically indicated.    Reason:   stroke     Fall precautions     Diet    Follow this diet upon discharge: Current Diet:Orders Placed This Encounter      Moderate Consistent Carb (60 g CHO per Meal) Diet     Stroke Hospital Follow Up (for neurologist use only)    Please be aware that coverage of these services is subject to the terms and limitations of your health insurance plan.  Call member services at your health plan with any benefit or coverage questions.  [employer] will call you to coordinate care as prescribed by your provider. If you don t hear from a representative within 2 business days, please call [phone].       Discharge Medications   Current Discharge Medication List        START taking these medications    Details   clopidogrel (PLAVIX) 75 MG tablet Take 1 tablet (75 mg) by mouth daily for 21 days.    Associated Diagnoses: Cerebrovascular accident (CVA), unspecified mechanism (H)           CONTINUE these medications which have CHANGED    Details   atorvastatin (LIPITOR) 20 MG tablet Take 1 tablet (20 mg) by  mouth every evening.    Associated Diagnoses: Cerebrovascular accident (CVA), unspecified mechanism (H)      insulin aspart (NOVOLOG FLEXPEN) 100 UNIT/ML pen Inject 1-15 Units subcutaneously 3 times daily (with meals). Take 1 units of insulin with 10 gram of carb with each meal    Comments: Take 1 unit per 10 grams of Carb with each meal.  Associated Diagnoses: Type 2 diabetes mellitus without complication, unspecified whether long term insulin use (H)      insulin glargine 100 UNIT/ML pen Inject 12 Units subcutaneously at bedtime.    Comments: If Lantus is not covered by insurance, may substitute Basaglar or Semglee or other insulin glargine product per insurance preference at same dose and frequency.    Associated Diagnoses: Type 2 diabetes mellitus without complication, unspecified whether long term insulin use (H)           CONTINUE these medications which have NOT CHANGED    Details   acetaminophen (TYLENOL) 325 MG tablet Take 2 tablets (650 mg) by mouth every 4 hours as needed for other (For optimal non-opioid multimodal pain management to improve pain control.)  Refills: 0    Associated Diagnoses: Primary osteoarthritis of right knee      aspirin 81 MG EC tablet Take 1 tablet (81 mg) by mouth daily  Refills: 0    Associated Diagnoses: Type 2 diabetes mellitus with other specified complication, with long-term current use of insulin (H)      !! carvedilol (COREG) 12.5 MG tablet Take 12.5 mg by mouth 2 times daily (with meals). Take with carvedilol 25 mg BID for a total of 37.5 mg BID.      !! carvedilol (COREG) 25 MG tablet Take 25 mg by mouth 2 times daily (with meals)      furosemide (LASIX) 40 MG tablet Take 40 mg by mouth daily      !! hydrALAZINE (APRESOLINE) 25 MG tablet Take 25 mg by mouth 3 times daily.      !! hydrALAZINE (APRESOLINE) 50 MG tablet Take 50 mg by mouth 3 times daily. Take with hydralazine 25 mg TID for a total of 75 mg TID.      losartan (COZAAR) 100 MG tablet Take 100 mg by mouth daily       vitamin D3 (VITAMIN D3) 50 mcg (2000 units) tablet Take 1 tablet by mouth daily        !! - Potential duplicate medications found. Please discuss with provider.        Allergies   Allergies   Allergen Reactions    Oxycodone-Acetaminophen      lightheadedness    Tramadol      lightheadedness    Phenothiazines Rash     Pt unsure of this     Data   Most Recent 3 CBC's:  Recent Labs   Lab Test 07/23/25  0855 07/21/25  1100 07/20/25  0802   WBC 5.6 6.0 6.0   HGB 15.5 15.8* 16.2*   MCV 90 89 89   * 124* 146*      Most Recent 3 BMP's:  Recent Labs   Lab Test 07/23/25  0855 07/23/25  0836 07/22/25  2210 07/21/25  2145 07/21/25  1914 07/21/25  1314 07/21/25  1100     --   --   --  135  --  138   POTASSIUM 3.6  --   --   --  4.3  --  4.0   CHLORIDE 106  --   --   --  100  --  100   CO2 23  --   --   --  23  --  20*   BUN 42.0*  --   --   --  38.6*  --  35.1*   CR 1.83*  --   --   --  1.66*  --  1.85*   ANIONGAP 12  --   --   --  12  --  18*   JURGEN 9.6  --   --   --  9.4  --  9.5   * 196* 202*   < > 335*   < > 396*    < > = values in this interval not displayed.     Most Recent 2 LFT's:  Recent Labs   Lab Test 07/21/25  1914   AST 30   ALT 14   ALKPHOS 138   BILITOTAL 0.3     Most Recent INR's and Anticoagulation Dosing History:  Anticoagulation Dose History           No data to display              Most Recent 3 Troponin's:No lab results found.  Most Recent Cholesterol Panel:  Recent Labs   Lab Test 07/19/25  2308   CHOL 154   LDL 80   HDL 64   TRIG 51     Most Recent 6 Bacteria Isolates From Any Culture (See EPIC Reports for Culture Details):No lab results found.  Most Recent TSH, T4 and A1c Labs:  Recent Labs   Lab Test 07/19/25  2114   A1C 7.7*     Results for orders placed or performed during the hospital encounter of 07/19/25   CT Head w/o Contrast    Narrative    EXAM: CT HEAD W/O CONTRAST  LOCATION: [facility]  DATE: 7/19/2025    INDICATION: Left sided weakness,  dizziness, onset 2 days ago  COMPARISON: None.  TECHNIQUE: Routine CT Head without IV contrast. Multiplanar reformats. Dose reduction techniques were used.    FINDINGS:  INTRACRANIAL CONTENTS: No intracranial hemorrhage, extraaxial collection, or mass effect.  No CT evidence of acute infarct. Mild presumed chronic small vessel ischemic changes. Mild generalized volume loss. No hydrocephalus.     VISUALIZED ORBITS/SINUSES/MASTOIDS: No intraorbital abnormality. Mild mucosal thickening scattered about the paranasal sinuses. No middle ear or mastoid effusion.    BONES/SOFT TISSUES: No acute abnormality.      Impression    IMPRESSION:  1.  No CT evidence for acute intracranial process.  2.  Brain atrophy and presumed chronic microvascular ischemic changes as above.     MR Brain w/o & w Contrast    Narrative    EXAM: MR BRAIN WITHOUT AND WITH CONTRAST, MRA BRAIN ([place]) WITHOUT CONTRAST, MRA NECK (CAROTIDS) WITHOUT AND WITH CONTRAST  LOCATION: [facility]  DATE: 07/20/2025    INDICATION: Dizziness, elevated blood pressure.  COMPARISON: CT head 07/19/2025.  CONTRAST: 10 mL Gadavist.  TECHNIQUE:   1) Routine multiplanar multisequence head MRI without and with intravenous contrast.  2) 3D time-of-flight head MRA without intravenous contrast.  3) Neck MRA without and with IV contrast. Stenosis measurements made according to NASCET criteria unless otherwise specified.    FINDINGS:  HEAD MRI:  INTRACRANIAL CONTENTS: Small focus of acute to early subacute ischemia in the posterior limb left internal capsule measuring 13 x 6 mm. Mild FLAIR hyperintensity. No hemorrhage. Mild volume loss and presumed chronic small vessel ischemia. No mass, acute   hemorrhage, or extra-axial fluid collections. Normal position of the cerebellar tonsils. No pathologic contrast enhancement.    SELLA: No abnormality accounting for technique.    OSSEOUS STRUCTURES/SOFT TISSUES: Normal marrow signal. The major  intracranial vascular flow-voids are maintained.     ORBITS: No abnormality accounting for technique.     SINUSES/MASTOIDS: No paranasal sinus mucosal disease. No middle ear or mastoid effusion.     HEAD MRA:   ANTERIOR CIRCULATION: No stenosis/occlusion, aneurysm, or high-flow vascular malformation. Standard [place] anatomy.    POSTERIOR CIRCULATION: No stenosis/occlusion, aneurysm, or high-flow vascular malformation. Balanced vertebral arteries supply a normal basilar artery.     NECK MRA:   RIGHT CAROTID: 60-70% narrowing right ICA just above the bifurcation by NASCET criteria.    LEFT CAROTID: Atheromatous disease without significant narrowing.    VERTEBRAL ARTERIES: No focal stenosis or dissection. Balanced vertebral arteries.    AORTIC ARCH: Classic aortic arch anatomy with no significant stenosis at the origin of the great vessels.      Impression    IMPRESSION:  HEAD MRI:   1.  Small acute to early subacute infarct posterior limb left internal capsule.    2.  Age-related and chronic ischemic changes.    HEAD MRA:   1.  Normal MRA [place].    NECK MRA:   1.  60-70% narrowing right ICA by NASCET criteria  2.  No other carotid or vertebral artery stenosis.     MRA Neck (Carotids) wo & w Contrast    Narrative    EXAM: MR BRAIN WITHOUT AND WITH CONTRAST, MRA BRAIN ([place]) WITHOUT CONTRAST, MRA NECK (CAROTIDS) WITHOUT AND WITH CONTRAST  LOCATION: [facility]  DATE: 07/20/2025    INDICATION: Dizziness, elevated blood pressure.  COMPARISON: CT head 07/19/2025.  CONTRAST: 10 mL Gadavist.  TECHNIQUE:   1) Routine multiplanar multisequence head MRI without and with intravenous contrast.  2) 3D time-of-flight head MRA without intravenous contrast.  3) Neck MRA without and with IV contrast. Stenosis measurements made according to NASCET criteria unless otherwise specified.    FINDINGS:  HEAD MRI:  INTRACRANIAL CONTENTS: Small focus of acute to early subacute ischemia  in the posterior limb left internal capsule measuring 13 x 6 mm. Mild FLAIR hyperintensity. No hemorrhage. Mild volume loss and presumed chronic small vessel ischemia. No mass, acute   hemorrhage, or extra-axial fluid collections. Normal position of the cerebellar tonsils. No pathologic contrast enhancement.    SELLA: No abnormality accounting for technique.    OSSEOUS STRUCTURES/SOFT TISSUES: Normal marrow signal. The major intracranial vascular flow-voids are maintained.     ORBITS: No abnormality accounting for technique.     SINUSES/MASTOIDS: No paranasal sinus mucosal disease. No middle ear or mastoid effusion.     HEAD MRA:   ANTERIOR CIRCULATION: No stenosis/occlusion, aneurysm, or high-flow vascular malformation. Standard [place] anatomy.    POSTERIOR CIRCULATION: No stenosis/occlusion, aneurysm, or high-flow vascular malformation. Balanced vertebral arteries supply a normal basilar artery.     NECK MRA:   RIGHT CAROTID: 60-70% narrowing right ICA just above the bifurcation by NASCET criteria.    LEFT CAROTID: Atheromatous disease without significant narrowing.    VERTEBRAL ARTERIES: No focal stenosis or dissection. Balanced vertebral arteries.    AORTIC ARCH: Classic aortic arch anatomy with no significant stenosis at the origin of the great vessels.      Impression    IMPRESSION:  HEAD MRI:   1.  Small acute to early subacute infarct posterior limb left internal capsule.    2.  Age-related and chronic ischemic changes.    HEAD MRA:   1.  Normal MRA [place].    NECK MRA:   1.  60-70% narrowing right ICA by NASCET criteria  2.  No other carotid or vertebral artery stenosis.     MRA Brain ([place]) wo Contrast    Narrative    EXAM: MR BRAIN WITHOUT AND WITH CONTRAST, MRA BRAIN ([place]) WITHOUT CONTRAST, MRA NECK (CAROTIDS) WITHOUT AND WITH CONTRAST  LOCATION: [facility]  DATE: 07/20/2025    INDICATION: Dizziness, elevated blood  pressure.  COMPARISON: CT head 07/19/2025.  CONTRAST: 10 mL Gadavist.  TECHNIQUE:   1) Routine multiplanar multisequence head MRI without and with intravenous contrast.  2) 3D time-of-flight head MRA without intravenous contrast.  3) Neck MRA without and with IV contrast. Stenosis measurements made according to NASCET criteria unless otherwise specified.    FINDINGS:  HEAD MRI:  INTRACRANIAL CONTENTS: Small focus of acute to early subacute ischemia in the posterior limb left internal capsule measuring 13 x 6 mm. Mild FLAIR hyperintensity. No hemorrhage. Mild volume loss and presumed chronic small vessel ischemia. No mass, acute   hemorrhage, or extra-axial fluid collections. Normal position of the cerebellar tonsils. No pathologic contrast enhancement.    SELLA: No abnormality accounting for technique.    OSSEOUS STRUCTURES/SOFT TISSUES: Normal marrow signal. The major intracranial vascular flow-voids are maintained.     ORBITS: No abnormality accounting for technique.     SINUSES/MASTOIDS: No paranasal sinus mucosal disease. No middle ear or mastoid effusion.     HEAD MRA:   ANTERIOR CIRCULATION: No stenosis/occlusion, aneurysm, or high-flow vascular malformation. Standard [place] anatomy.    POSTERIOR CIRCULATION: No stenosis/occlusion, aneurysm, or high-flow vascular malformation. Balanced vertebral arteries supply a normal basilar artery.     NECK MRA:   RIGHT CAROTID: 60-70% narrowing right ICA just above the bifurcation by NASCET criteria.    LEFT CAROTID: Atheromatous disease without significant narrowing.    VERTEBRAL ARTERIES: No focal stenosis or dissection. Balanced vertebral arteries.    AORTIC ARCH: Classic aortic arch anatomy with no significant stenosis at the origin of the great vessels.      Impression    IMPRESSION:  HEAD MRI:   1.  Small acute to early subacute infarct posterior limb left internal capsule.    2.  Age-related and chronic ischemic changes.    HEAD MRA:   1.  Normal MRA  [place].    NECK MRA:   1.  60-70% narrowing right ICA by NASCET criteria  2.  No other carotid or vertebral artery stenosis.     Echocardiogram Complete     Value    LVEF  55-60%    [facility]    [id number]  [id number]  [id number]^ROMANA^JOSE^KELLY     [facility]  Echocardiography Laboratory  [address] [place] [address]     Name: JUANITA WORKMAN  MRN: [id number]  : 1955  Study Date: 2025 02:56 PM  Age: 69 yrs  Gender: Female  Patient Location: [facility]  Reason For Study: CVA, CVA  Ordering Physician: JOSE AVENDANO  Performed By: BERNADETTE Brewer     BSA: 1.8 m2  Height: 62 in  Weight: 174 lb  HR: 72  BP: 175/121 mmHg  ______________________________________________________________________________  Procedure  Echocardiogram with two-dimensional, color and spectral Doppler. Definity (NDC  #88201-691) given intravenously. Contrast Definity. Technically difficult  study.  ______________________________________________________________________________  Interpretation Summary     The visual ejection fraction is 55-60%. There is mild to moderate concentric  left ventricular hypertrophy.  The right ventricle is normal in size and function.  No hemodynamically significant valvular disease.  There is no pericardial effusion.  The inferior vena cava was normal in size with preserved respiratory  variability.     Technically difficult study.  ______________________________________________________________________________  Left Ventricle  The left ventricle is normal in size. There is mild to moderate concentric  left ventricular hypertrophy. The visual ejection fraction is 55-60%.  Diastolic Doppler findings (E/E' ratio and/or other parameters) suggest left  ventricular filling pressures are indeterminate.     Right Ventricle  The right ventricle is normal in size and function.     Atria  Normal left atrial size. Right atrial size is normal.     Mitral  Valve  The mitral valve is normal in structure and function. There is trace mitral  regurgitation.     Tricuspid Valve  There is trace tricuspid regurgitation.     Aortic Valve  The aortic valve is trileaflet with aortic valve sclerosis. No hemodynamically  significant valvular aortic stenosis.     Pulmonic Valve  The pulmonic valve is not well visualized.     Vessels  The aortic root is normal size. The inferior vena cava was normal in size with  preserved respiratory variability.     Pericardium  There is no pericardial effusion.     ______________________________________________________________________________  MMode/2D Measurements & Calculations  IVSd: 1.4 cm  LVIDd: 3.1 cm  LVIDs: 1.5 cm  LVPWd: 1.4 cm  FS: 51.6 %     LV mass(C)d: 146.7 grams  LV mass(C)dI: 81.4 grams/m2  Ao root diam: 2.8 cm  asc Aorta Diam: 2.8 cm  LVOT diam: 2.0 cm  LVOT area: 3.1 cm2  Ao root diam index Ht(cm/m): 1.8  Ao root diam index BSA (cm/m2): 1.6  Asc Ao diam index BSA (cm/m2): 1.6  Asc Ao diam index Ht(cm/m): 1.8  LA Volume (BP): 29.6 ml  LA Volume Index (BP): 16.4 ml/m2     RWT: 0.90  TAPSE: 1.8 cm     Doppler Measurements & Calculations  MV E max javier: 53.3 cm/sec  MV A max javier: 91.9 cm/sec  MV E/A: 0.58  MV dec slope: 167.0 cm/sec2  MV dec time: 0.32 sec  PA acc time: 0.07 sec  E/E' av.2  Lateral E/e': 11.7  Medial E/e': 12.6  RV S Javier: 9.4 cm/sec     ______________________________________________________________________________  Report approved by: Ferny Mckinney MD on 2025 04:10 PM           Most Recent 3 CBC's:  Recent Labs   Lab Test 25  0855 25  1100 25  0802   WBC 5.6 6.0 6.0   HGB 15.5 15.8* 16.2*   MCV 90 89 89   * 124* 146*     Most Recent 3 BMP's:  Recent Labs   Lab Test 25  0855 25  0836 25  2210 25  2145 25  1914 25  1314 25  1100     --   --   --  135  --  138   POTASSIUM 3.6  --   --   --  4.3  --  4.0   CHLORIDE 106  --   --   --   100  --  100   CO2 23  --   --   --  23  --  20*   BUN 42.0*  --   --   --  38.6*  --  35.1*   CR 1.83*  --   --   --  1.66*  --  1.85*   ANIONGAP 12  --   --   --  12  --  18*   JURGEN 9.6  --   --   --  9.4  --  9.5   * 196* 202*   < > 335*   < > 396*    < > = values in this interval not displayed.     Most Recent 2 LFT's:  Recent Labs   Lab Test 07/21/25 1914   AST 30   ALT 14   ALKPHOS 138   BILITOTAL 0.3     Most Recent 3 INR's:No lab results found.

## 2025-07-23 NOTE — PROGRESS NOTES
Patient arrived on unit @ 2pm and settled in by charge RN. Patient's /68 and blood sugar 310. Provider notified of elevated bp, order to proceed with giving scheduled hydralazine and re-check in an hour. Notify provider if SBP>160 after an hour.    Orientation:alert and oriented   Bowel: continent   LBM:7/22   Bladder:continent, commode or bathroom   Pain: none reported upon admission   Ambulation/Transfers: 1 assist with gait belt and walker   Diet/Liquids: modified carb diet, thin liquids, pills whole   Tubes/Lines/Drains: none

## 2025-07-23 NOTE — PROGRESS NOTES
Care Management Discharge Note    Discharge Date: 07/23/2025       Discharge Disposition: [facility]  Discharge Services: therapies, transport  Discharge Transportation:  Health wheelchair    Private pay costs discussed: Not applicable    Does the patient's insurance plan have a 3 day qualifying hospital stay waiver?  Yes   Which insurance plan 3 day waiver is available? Alternative insurance waiver  Will the waiver be used for post-acute placement? No    Education Provided on the Discharge Plan: yes   Persons Notified of Discharge Plans: patient, daughter, facility, bedside RN, hospitalist  Patient/Family in Agreement with the Plan: yes    Handoff Referral Completed: No, handoff not indicated or clinically appropriate    Additional Information:  Patient received insurance authorization and can discharge today as planned if medically cleared. Authorization number: [id number] with dates: 7/22-7/26. Authorization faxed to [facility]. [facility] wheelchair is set to pickup between [id number] to go to [facility]. SW met with patient and daughter to confirm plans for today.     Mallorie Sloan, DAMIAN, LGSW   Social Work   [facility]

## 2025-07-23 NOTE — PROGRESS NOTES
Rehab Admissions:  Met with pt and her daughter to provide them with information regarding [facility], including location, parking, contact info, visitor policy, room accommodations, meals, medications, ELOS, what to bring, the intensive rehab schedule and physician involvement. Prior to this hospitalization, pt was mostly independent and living at home with her spouse. After rehab, the plan is for pt to return home with family and home care support. Pt's daughter will return home in several days but she has another daughter in the area. Pt's spouse has his own health concerns to manage but has been driving, etc.      Thank you for the referral, we will continue to follow this patient for post acute placement.     Determination of admission is based upon the patient's need for an intensive, interdisciplinary approach to rehabilitation, their ability to progress, their ability to tolerate intensive therapies, their need for daily physician supervision, their need for twenty four hour nursing assistance, and their ability and willingness to participate in such a program.    Funmi Pena CM  Rehab Liaison/  [facility]  7/23/2025    1:05 PM

## 2025-07-23 NOTE — PLAN OF CARE
Physical Therapy Discharge Summary    Reason for therapy discharge:    Discharged to acute rehabilitation facility.    Progress towards therapy goal(s). See goals on Care Plan in [facility] electronic health record for goal details.  Goals partially met.  Barriers to achieving goals:   discharge from facility.    Therapy recommendation(s):    Continued therapy is recommended.  Rationale/Recommendations:   . Pt tolerated session well. Pt well below baseline functional independence. Pt limited by decreased activity tolerance, decreased strength, impaired balance, impaired coordination and proprioception, fall risk. Pt at baseline lives with spouse in house with stairs, independent at baseline. Pt currently requiring Ax1 for transfers, use of walker for ambulation. Pt motivated, has good family support. Pt would benefit from continued skilled therapy at ARU prior to returning home safely. Will continue to update as appropriate.  PT Brief overview of current status: STS to [facility], Ramila. amb with [facility]WW, CGA. recommend pt amb in hallway 3x per day.  PT Total Distance Amb During Session (feet): 300

## 2025-07-23 NOTE — PLAN OF CARE
Occupational Therapy Discharge Summary    Reason for therapy discharge:    Discharged to acute rehabilitation facility.    Progress towards therapy goal(s). See goals on Care Plan in [facility] electronic health record for goal details.  Goals partially met.  Barriers to achieving goals:   discharge from facility.    Therapy recommendation(s):    Continued therapy is recommended.  Rationale/Recommendations:  to increase indep with functional mobility and self cares.

## 2025-07-23 NOTE — PROGRESS NOTES
Stroke Education Note    The following information was reviewed with patient and family:    - Activation of emergency medical system (when to call 911)    - Individualized risk factors for stroke:      high blood pressure     high cholesterol     diabetes     overweight     diet    - Warning signs and symptoms of stroke:   B = Balance loss   E = Eyesight changes   F = Facial droop or numbness   A = Arm or leg weakness   S = Speech difficulty, slurred speech   T = Time to call 911 for help    Written stroke educational materials given:   - Learning about BE FAST: Stroke Warning Signs and Learning about Risk Factors for Stroke (Healthwise)   - Understanding Stroke: Key Resources After a Stroke (FOD #545376)    Learner's response to education:     taking steps     Danya Caba RN

## 2025-07-23 NOTE — PLAN OF CARE
Goal Outcome Evaluation:      Reason for Admission: Acute/subacute L internal capsule ischemic stroke.     Cognitive/Mentation: A/Ox 4  Neuros/CMS: Slight R facial droop, baseline. Slightly weaker on R side, 4/5 RUE/RLE. Slight R pronator drift. Baseline blurry vision.  VS: VSS on RA ex HTN, within parameters. SBP <180.      /GI: Continent. Last BM 7/22/25.   Pulmonary: LS clear.  Pain: Denies. Chronic lower back and neck pain.     Drains/Lines: R PIV, SL.   Skin: Intact.  Activity: Assist x 1 with GBW.  Diet: Mod carb diet (carb count) with thin liquids. Takes pills whole.      Therapies recs: [facility].  Discharge: Pending insurance authorization.     Aggression Stoplight Tool: Green     End of shift summary: Plan to discharge to ARU today via wheelchair between [id number] pending insurance authorization.

## 2025-07-24 ENCOUNTER — APPOINTMENT (OUTPATIENT)
Dept: OCCUPATIONAL THERAPY | Facility: CLINIC | Age: 70
DRG: 057 | End: 2025-07-24
Attending: PHYSICAL MEDICINE & REHABILITATION
Payer: COMMERCIAL

## 2025-07-24 ENCOUNTER — APPOINTMENT (OUTPATIENT)
Dept: PHYSICAL THERAPY | Facility: CLINIC | Age: 70
DRG: 057 | End: 2025-07-24
Attending: PHYSICAL MEDICINE & REHABILITATION
Payer: COMMERCIAL

## 2025-07-24 VITALS
TEMPERATURE: 97.8 F | OXYGEN SATURATION: 97 % | HEIGHT: 62 IN | HEART RATE: 68 BPM | DIASTOLIC BLOOD PRESSURE: 68 MMHG | BODY MASS INDEX: 33.92 KG/M2 | RESPIRATION RATE: 16 BRPM | WEIGHT: 184.3 LBS | SYSTOLIC BLOOD PRESSURE: 177 MMHG

## 2025-07-24 LAB
ANION GAP SERPL CALCULATED.3IONS-SCNC: 14 MMOL/L (ref 7–15)
BUN SERPL-MCNC: 45.4 MG/DL (ref 8–23)
CALCIUM SERPL-MCNC: 9.2 MG/DL (ref 8.8–10.4)
CHLORIDE SERPL-SCNC: 103 MMOL/L (ref 98–107)
CREAT SERPL-MCNC: 1.89 MG/DL (ref 0.51–0.95)
EGFRCR SERPLBLD CKD-EPI 2021: 28 ML/MIN/1.73M2
ERYTHROCYTE [DISTWIDTH] IN BLOOD BY AUTOMATED COUNT: 12.6 % (ref 10–15)
GLUCOSE BLDC GLUCOMTR-MCNC: 234 MG/DL (ref 70–99)
GLUCOSE BLDC GLUCOMTR-MCNC: 261 MG/DL (ref 70–99)
GLUCOSE BLDC GLUCOMTR-MCNC: 272 MG/DL (ref 70–99)
GLUCOSE BLDC GLUCOMTR-MCNC: 300 MG/DL (ref 70–99)
GLUCOSE BLDC GLUCOMTR-MCNC: 342 MG/DL (ref 70–99)
GLUCOSE SERPL-MCNC: 306 MG/DL (ref 70–99)
HCO3 SERPL-SCNC: 21 MMOL/L (ref 22–29)
HCT VFR BLD AUTO: 44.4 % (ref 35–47)
HGB BLD-MCNC: 14.7 G/DL (ref 11.7–15.7)
MCH RBC QN AUTO: 29.3 PG (ref 26.5–33)
MCHC RBC AUTO-ENTMCNC: 33.1 G/DL (ref 31.5–36.5)
MCV RBC AUTO: 89 FL (ref 78–100)
PLATELET # BLD AUTO: 123 10E3/UL (ref 150–450)
POTASSIUM SERPL-SCNC: 3.7 MMOL/L (ref 3.4–5.3)
RBC # BLD AUTO: 5.01 10E6/UL (ref 3.8–5.2)
SODIUM SERPL-SCNC: 138 MMOL/L (ref 135–145)
WBC # BLD AUTO: 6.1 10E3/UL (ref 4–11)

## 2025-07-24 PROCEDURE — 99222 1ST HOSP IP/OBS MODERATE 55: CPT | Performed by: INTERNAL MEDICINE

## 2025-07-24 PROCEDURE — 250N000012 HC RX MED GY IP 250 OP 636 PS 637: Performed by: PHYSICIAN ASSISTANT

## 2025-07-24 PROCEDURE — 36415 COLL VENOUS BLD VENIPUNCTURE: CPT | Performed by: PHYSICIAN ASSISTANT

## 2025-07-24 PROCEDURE — 250N000011 HC RX IP 250 OP 636: Performed by: PHYSICIAN ASSISTANT

## 2025-07-24 PROCEDURE — 85018 HEMOGLOBIN: CPT | Performed by: PHYSICIAN ASSISTANT

## 2025-07-24 PROCEDURE — 97110 THERAPEUTIC EXERCISES: CPT | Mod: GP

## 2025-07-24 PROCEDURE — 80048 BASIC METABOLIC PNL TOTAL CA: CPT | Performed by: PHYSICIAN ASSISTANT

## 2025-07-24 PROCEDURE — 250N000013 HC RX MED GY IP 250 OP 250 PS 637: Performed by: PHYSICIAN ASSISTANT

## 2025-07-24 PROCEDURE — 97530 THERAPEUTIC ACTIVITIES: CPT | Mod: GP

## 2025-07-24 PROCEDURE — 97535 SELF CARE MNGMENT TRAINING: CPT | Mod: GO

## 2025-07-24 PROCEDURE — 97161 PT EVAL LOW COMPLEX 20 MIN: CPT | Mod: GP

## 2025-07-24 PROCEDURE — 128N000003 HC R&B REHAB

## 2025-07-24 PROCEDURE — 97166 OT EVAL MOD COMPLEX 45 MIN: CPT | Mod: GO

## 2025-07-24 RX ADMIN — CARVEDILOL 25 MG: 25 TABLET, FILM COATED ORAL at 18:04

## 2025-07-24 RX ADMIN — HYDRALAZINE HYDROCHLORIDE 50 MG: 50 TABLET ORAL at 13:06

## 2025-07-24 RX ADMIN — HYDRALAZINE HYDROCHLORIDE 50 MG: 50 TABLET ORAL at 20:53

## 2025-07-24 RX ADMIN — HYDRALAZINE HYDROCHLORIDE 50 MG: 50 TABLET ORAL at 07:49

## 2025-07-24 RX ADMIN — CARVEDILOL 25 MG: 25 TABLET, FILM COATED ORAL at 07:50

## 2025-07-24 RX ADMIN — ATORVASTATIN CALCIUM 20 MG: 10 TABLET, FILM COATED ORAL at 20:53

## 2025-07-24 RX ADMIN — CLOPIDOGREL BISULFATE 75 MG: 75 TABLET, FILM COATED ORAL at 07:49

## 2025-07-24 RX ADMIN — INSULIN ASPART 3 UNITS: 100 INJECTION, SOLUTION INTRAVENOUS; SUBCUTANEOUS at 19:01

## 2025-07-24 RX ADMIN — INSULIN ASPART 3 UNITS: 100 INJECTION, SOLUTION INTRAVENOUS; SUBCUTANEOUS at 12:58

## 2025-07-24 RX ADMIN — Medication 50 MCG: at 07:49

## 2025-07-24 RX ADMIN — FUROSEMIDE 40 MG: 40 TABLET ORAL at 07:50

## 2025-07-24 RX ADMIN — ASPIRIN 81 MG: 81 TABLET, DELAYED RELEASE ORAL at 07:49

## 2025-07-24 RX ADMIN — INSULIN ASPART 4 UNITS: 100 INJECTION, SOLUTION INTRAVENOUS; SUBCUTANEOUS at 09:23

## 2025-07-24 RX ADMIN — ENOXAPARIN SODIUM 30 MG: 30 INJECTION SUBCUTANEOUS at 16:45

## 2025-07-24 ASSESSMENT — ACTIVITIES OF DAILY LIVING (ADL)
ADLS_ACUITY_SCORE: 51
IADLS,_PREVIOUS_FUNCTIONAL_LEVEL: PARTIAL ASSISTANCE
BADLS,_PREVIOUS_FUNCTIONAL_LEVEL: INDEPENDENT;USES DEVICE OR EQUIPMENT
ADLS_ACUITY_SCORE: 51
ADLS_ACUITY_SCORE: 51

## 2025-07-24 NOTE — CONSULTS
[facility]  Consult Note - Hospitalist Service  Date of Admission:  7/23/2025  Consult Requested by: PM&R  Reason for Consult: Medical co-mgt     Assessment & Plan   Judi Moore is a 69 year old female admitted on 7/23/2025. She A 69-year-old female with a complex medical history, including type 2 diabetes, hypertension, dyslipidemia, obstructive sleep apnea, CKD stage IV, and a prior cerebellar stroke, was admitted with a 1-day history of right upper extremity weakness, ataxia, and difficulty ambulating. These symptoms occurred in the context of persistent severe hypertension at home, with systolic blood pressures exceeding 200 mmHg despite medication compliance.    Upon admission, a CT scan of the head showed no acute intracranial process. However, an MRI of the brain on 7/20 revealed a small acute to early subacute infarct in the left internal capsule, likely secondary to small vessel disease, with chronic ischemic changes. An MRA of the head was normal, while the MRA of the neck showed 60-70% narrowing of the right internal carotid artery. Echocardiography indicated a normal ejection fraction with mild to moderate concentric left ventricular hypertrophy and no significant valvular pathology. She was loaded with Plavix and started on a regimen of Plavix 5 mg daily, in addition to continuing aspirin 81 mg daily. Dual antiplatelet therapy is planned for at least three weeks.     The patient was initially managed with permissive hypertension, with a blood pressure goal of less than 180 mmHg during the acute phase, transitioning to a long-term goal of less than 130/80 mmHg. Her atorvastatin dose was increased from 10 mg to 20 mg daily. A neurology follow-up is scheduled in 6-8 weeks.    Patient was admitted to acute rehab on July 23, 2025 for ongoing rehabilitation.  Internal medicine is following for medical comanagement and management of blood pressure.    # Acute  to Subacute Left Internal Capsule Ischemic Stroke     The stroke is likely secondary to small vessel disease. The patient was started on dual antiplatelet therapy with Plavix and aspirin, which will continue for at least three weeks.   - Blood pressure management is crucial, with a long-term goal of less than 130/80 mmHg.   - Neurology follow-up is scheduled in 6-8 weeks.  - Therapy per PM&R team  -See plan below for management of hypertension    # Hypertension  Patient's PTA medications include Coreg 37.5 mg twice daily, furosemide 40 mg daily, hydralazine 25 mg 3 times daily, losartan 100 mg daily.  Prior to transfer to the hospital, per discussion with hospitalist at [facility], patient should be at her long-term goal blood pressure treatment.  -Given persistently elevated BP, we recommend the following:  -Resume Coreg at 37.5 mg twice daily  -Will recommend resuming losartan at 100 mg daily  -Can continue hydralazine at home dose of 75 mg 3 times daily  -If the above measures do not control the blood pressure adequately, we recommend switching furosemide to chlorthalidone.  -If despite initiation of chlorthalidone patient's blood pressure remains difficult to control, would recommend consultation with nephrology for further assistance.      # Polycythemia     The elevated hemoglobin and hematocrit were likely due to dehydration, as they normalized with IV fluids. A JAK2 mutation test is pending, and the patient will follow up with hematology and GI for further evaluation. Next-generation sequencing results are expected in 2-3 weeks.    # Type 2 Diabetes Mellitus      The patient's blood sugar levels are elevated, with an A1c of 7.7%.   - Agree with increasing Lantus to 16 units nightly  -Continue insulin sliding scale  -Continue carb counting at this time.      # Dyslipidemia     Atorvastatin was increased to 20 mg daily, with a long-term LDL goal of less than 70. The current LDL is 80.    # Obstructive Sleep  "Apnea      The patient is CPAP-intolerant, and alternate therapies will be considered in follow-up to address nocturnal hypoxia and reduce vascular risk.    # Chronic Kidney Disease Stage IV with Secondary Hyperparathyroidism     The patient's renal function is stable, with no acute kidney injury during admission.   - Appears ongoing outpatient follow-up with nephrology.  Follow-up     Clinically Significant Risk Factors                 # Thrombocytopenia: Lowest platelets = 123 in last 2 days, will monitor for bleeding             # DMII: A1C = 7.7 % (Ref range: <5.7 %) within past 6 months   # Obesity: Estimated body mass index is 33.71 kg/m  as calculated from the following:    Height as of this encounter: 1.575 m (5' 2\").    Weight as of this encounter: 83.6 kg (184 lb 4.9 oz)., PRESENT ON ADMISSION     # Financial/Environmental Concerns:           GRAY BUTLER MD  Hospitalist Service  Securely message with Vocera (more info)  Text page via [employer] Paging/Directory   ______________________________________________________________________        History of Present Illness   Judi Moore is a 69 year old female admitted on 7/23/2025. She has a complex medical history, including type 2 diabetes, hypertension, dyslipidemia, obstructive sleep apnea, chronic kidney disease (CKD) stage IV, and a prior cerebellar stroke, was admitted with a 1-day history of right upper extremity weakness, ataxia, and difficulty ambulating. These symptoms occurred in the context of persistent severe hypertension at home, with systolic blood pressures exceeding 200 mmHg despite medication compliance.    Upon admission, a CT scan of the head showed no acute intracranial process. However, an MRI of the brain on 7/20 revealed a small acute to early subacute infarct in the left internal capsule, likely secondary to small vessel disease, with chronic ischemic changes. An MRA of the head was normal, while the MRA of the neck showed 60-70% " narrowing of the right internal carotid artery. Echocardiography indicated a normal ejection fraction with mild to moderate concentric left ventricular hypertrophy and no significant valvular pathology.    The patient was initially managed with permissive hypertension, with a blood pressure goal of less than 180 mmHg during the acute phase, transitioning to a long-term goal of less than 130/80 mmHg. She was loaded with Plavix and started on a regimen of Plavix 5 mg daily, in addition to continuing aspirin 81 mg daily. Dual antiplatelet therapy is planned for at least three weeks. Her atorvastatin dose was increased from 10 mg to 20 mg daily. A neurology follow-up is scheduled in 6-8 weeks.    At discharge, the patient was feeling well, with no headache, dizziness, lightheadedness, fever, chills, cough, dysuria, constipation, or diarrhea.  Today, she denies any acute complaints.  She continues to work adequately well with therapy.      Past Medical History    Past Medical History:   Diagnosis Date    Anemia     Cataracts, bilateral     Chronic bilateral back pain     without sciatica    Chronic pain     right knee    CKD (chronic kidney disease)     stage 3    Dermatitis     Diabetic macular edema (H)     Diabetic neuropathy (H)     DM (diabetes mellitus) (H)     Essential hypertension     Gastroenteritis     Hypercholesterolemia     Hyperparathyroidism     Hypertension     Knee pain, right     Medial meniscus tear     right knee    Nuclear sclerosis of both eyes     Obesity (BMI 30.0-34.9)     VANESSA (obstructive sleep apnea)     moderate    Peripheral edema     Pneumonia     Posterior vitreous detachment     both eyes    PPD positive     Retinopathy     Right carpal tunnel syndrome     Thrombocytopenia        Past Surgical History   Past Surgical History:   Procedure Laterality Date    ABDOMEN SURGERY      appy    ARTHROPLASTY KNEE Right 1/17/2022    Procedure: RIGHT TOTAL KNEE ARTHROPLASTY;  Surgeon: Gabriela  Colt PELAYO MD;  Location:  OR       Medications   I have reviewed this patient's current medications  Medications Prior to Admission   Medication Sig Dispense Refill Last Dose/Taking    aspirin 81 MG EC tablet Take 1 tablet (81 mg) by mouth daily  0 7/23/2025 at  9:33 AM    atorvastatin (LIPITOR) 20 MG tablet Take 1 tablet (20 mg) by mouth every evening.   7/23/2025 at  8:49 PM    carvedilol (COREG) 12.5 MG tablet Take 12.5 mg by mouth 2 times daily (with meals). Take with carvedilol 25 mg BID for a total of 37.5 mg BID.   Unknown    carvedilol (COREG) 25 MG tablet Take 25 mg by mouth 2 times daily (with meals)   7/23/2025 at  5:38 PM    clopidogrel (PLAVIX) 75 MG tablet Take 1 tablet (75 mg) by mouth daily for 21 days.   7/23/2025 at  9:31 AM    furosemide (LASIX) 40 MG tablet Take 40 mg by mouth daily   7/23/2025 at  9:33 AM    hydrALAZINE (APRESOLINE) 50 MG tablet Take 50 mg by mouth 3 times daily. Take with hydralazine 25 mg TID for a total of 75 mg TID.   7/23/2025 at  5:15 PM    insulin aspart (NOVOLOG FLEXPEN) 100 UNIT/ML pen Inject 1-15 Units subcutaneously 3 times daily (with meals). Take 1 units of insulin with 10 gram of carb with each meal   7/23/2025 at  4:03 PM    insulin glargine 100 UNIT/ML pen Inject 12 Units subcutaneously at bedtime.   7/23/2025 at 10:53 PM    acetaminophen (TYLENOL) 325 MG tablet Take 2 tablets (650 mg) by mouth every 4 hours as needed for other (For optimal non-opioid multimodal pain management to improve pain control.)  0 Unknown    hydrALAZINE (APRESOLINE) 25 MG tablet Take 25 mg by mouth 3 times daily.   Unknown    losartan (COZAAR) 100 MG tablet Take 100 mg by mouth daily   Unknown    vitamin D3 (VITAMIN D3) 50 mcg (2000 units) tablet Take 1 tablet by mouth daily    Unknown             Physical Exam   Vital Signs: Temp: 97.8  F (36.6  C) Temp src: Oral BP: (!) 199/77 Pulse: 67   Resp: 16 SpO2: 93 % O2 Device: None (Room air)    Weight: 184 lbs 4.87 oz      General  appearance: Alert, in no acute distress and Ox3   HEENT: Normocephalic, atraumatic; Pupils are equal, round and react to light; Extraocular   movements intact; Conjuctivae are Normal; Mucous membranes moist and without lesions   Pulm: clear to ausculation bilaterally, no wheeze, rales or rhonchi   CVS: RRR, no m/r/g   GI: Abdomen soft, non-tender throughout, normoactive bowel sounds and No rebound or guarding   Extremities: No cyanosis, clubbing or edema   Neurologic:   - Mental Status: Alert, relaxed, and cooperative. Thought process coherent. Oriented to   person, place, and time.   - Cranial Nerves: Il through Xll intact.   - Motor: Good muscle bulk and tone.   - Strength 5/5 throughout.   - Patient does have right-sided and left-sided visual deficits, but worse on the left side.      Medical Decision Making       65 MINUTES SPENT BY ME on the date of service doing chart review, history, exam, documentation & further activities per the note.      Data   ------------------------- PAST 24 HR DATA REVIEWED -----------------------------------------------    I have personally reviewed the following data over the past 24 hrs:    6.1  \   14.7   / 123 (L)     138 103 45.4 (H) /  300 (H)   3.7 21 (L) 1.89 (H) \       Imaging results reviewed over the past 24 hrs:   No results found for this or any previous visit (from the past 24 hours).

## 2025-07-24 NOTE — PROGRESS NOTES
"CLINICAL NUTRITION SERVICES - ASSESSMENT NOTE    RECOMMENDATIONS FOR MDs/PROVIDERS TO ORDER:  Appreciate encouragement surrounding PO intake    Registered Dietitian Interventions:  - Glucerna (vanilla) with breakfast  - Nutrition education: discussed importance of adequate intakes. Discussed available snacks/supplements to optimize nutrition. Encouraged preferred foods from outside hospital as able.    Future/Additional Recommendations:  Monitor PO intake, supplement use, labs, and weight trends     REASON FOR ASSESSMENT  Positive admission nutrition risk screen - wt loss of 2-13 lbs, decreased appetite    PMHx of HTN, T2DM, HLD, CKD4, chronic lacunar infarcts of bilateral cerebellar hemispheres (on 5/2023 MRI), VANESSA, and OA s/p R TKA (2022) who was admitted on 7/19/25 with acute to subacute left internal capsule ischemic stroke with hospital course complicated by hyperglycemia, elevated BP, polycythemia, and thrombocytopenia.     INFORMATION OBTAINED  Assessed patient in room. Family also present.     NUTRITION HISTORY  Pt reports eating well PTA and typically eating 3 meals/day. She has had decreased appetite during hospitalization due to not moving around as much.     CURRENT NUTRITION ORDERS  Diet: Moderate Consistent Carbohydrate  - Room service with assist    CURRENT INTAKE/TOLERANCE  25% per flowsheets    LABS  Nutrition-relevant labs:   BUN: 45.4 (H)  Cr: 1.89 (H)  Hemoglobin A1C: 7.7 (7/19)  Glucose POCT: 196-342 over 24 hours    MEDICATIONS  Nutrition-relevant medications:   Lasix  Novolog, high intensity sliding scale, 1 unit per 10 g CHO TID w/ meals  Lantus, 12 units at bedtime  Vitamin D3    ANTHROPOMETRICS  Height: 157.5 cm (5' 2\")  Admission Weight: 83.6 kg (184 lb 4.9 oz) (07/23/25 1444)   Most Recent Weight: 83.6 kg (184 lb 4.9 oz) (07/23/25 1444)  IBW: 50 kg  BMI: Body mass index is 33.71 kg/m .   Weight History:   Wt Readings from Last 10 Encounters:   07/23/25 83.6 kg (184 lb 4.9 oz)   07/21/25 " 79.2 kg (174 lb 9.6 oz)   01/24/22 86.2 kg (190 lb)   01/17/22 86.2 kg (190 lb)     Care Everywhere:  06/18/25: 86.6 kg (191 lb)   05/09/25: 88.3 kg (194 lb 11.2 oz)   03/21/25: 89.1 kg (196 lb 6.4 oz)   11/15/24: 89.9 kg (198 lb 1.6 oz)     3.5% wt loss in 1 month    Dosing Weight: 58 kg, based on adjusted wt    ASSESSED NUTRITION NEEDS  Estimated Energy Needs: 2864-0183 kcals/day (25 - 30 kcals/kg)  Justification: Maintenance  Estimated Protein Needs: 58-70 grams protein/day (1 - 1.2 grams of pro/kg)  Justification: CKD and Maintenance  Estimated Fluid Needs: 1 mL/kcal  Justification: Maintenance    SYSTEM AND PHYSICAL FINDINGS    GI symptoms: LBM 7/24 per RN note  Skin/wounds: no skin concerns noted    MALNUTRITION  % Intake: Decreased intake does not meet criteria  % Weight Loss: Weight loss does not meet criteria   Subcutaneous Fat Loss: None observed  Muscle Loss: None observed  Fluid Accumulation/Edema: None noted  Malnutrition Diagnosis: Patient does not meet two of the established criteria necessary for diagnosing malnutrition but is at risk for malnutrition  Malnutrition Present on Admission: No    NUTRITION DIAGNOSIS  Inadequate oral intake related to decreased appetite as evidenced by pt report and need for ONS to optimize nutrition.     INTERVENTIONS  See nutrition interventions above    GOALS  Patient to consume % of nutritionally adequate meal trays TID, or the equivalent with supplements/snacks.     MONITORING/EVALUATION  Progress toward goals will be monitored and evaluated per policy.     Joan Hooker RD, LD  Available via phone and Vocera  Phone: [phone]  Vocera: 5R Acute Rehab Clinical Dietitian  Weekend/Holiday Vocera: Weekend Holiday Clinical Dietitian [Multi Site Groups]

## 2025-07-24 NOTE — PROGRESS NOTES
"Discharge Planner Post-Acute Rehab PT:     Discharge Plan: home with supv, HHPT    Precautions: R hakeem, falls,     Current Status:  Bed Mobility: Ramila L/R roll, SBA sup>sit  Transfer: CGA FWW  Gait: 150' with FWW, CGA  Stairs: 8-6\" stairs with R ascending rail, Ramila for stability  Balance: 150' with FWW and CGA, slowed pace and shortened step length     Outcome Measures:   TU/24: 36.7 sec with FWW    Michaels:    Assessment:  Pt presents below functional baseline following acute to subacute L internal capsule ischemis stroke. At baseline, pt is IND with mobility. Goals for supv with LRAD,  is available but unable to physically assist. Primary impairments include impaired balance, decreased strength, impaired ROM (neck), coordination deficits, impaired activity tolerance, R hemiparesis. Pt will benefit from skilled PT to promote improved safety and independence with mobility. ELOS 1 week.     Other Barriers to Discharge (DME, Family Training, etc):   Family training:  Owns FWW and cane     "

## 2025-07-24 NOTE — PHARMACY-MEDICATION REGIMEN REVIEW
Pharmacy Medication Regimen Review  Judi Moore is a 69 year old female who is currently in the Acute Rehab Unit.    Assessment: Upon review of the medications and patient chart the following irregularities were found:   Medications that require additional monitoring include:   Given furosemide is being restarted, consider a repeat BMP tomorrow or Saturday 7/26/25    Plan:   Recommend continuing to follow BMP given number of medications that may alter values.    Attending provider will be sent this note for review.  If there are any emergent issues noted above, pharmacist will contact provider directly by phone.      Pharmacy will periodically review the resident's medication regimen for any PRN medications not administered in > 72 hours and discontinue them. The pharmacist will discuss gradual dose reductions of psychopharmacologic medications with interdisciplinary team on a regular basis.    Please contact pharmacy if the above does not answer specific medication questions/concerns.    Background:  A pharmacist has reviewed all medications and pertinent medical history today.  Medications were reviewed for appropriate use and any irregularities found are listed with recommendations.      Current Facility-Administered Medications:     acetaminophen (TYLENOL) tablet 650 mg, 650 mg, Oral, Q4H PRN, Carol Hayes PA-C    aspirin EC tablet 81 mg, 81 mg, Oral, Daily, Carol Hayes PA-C, 81 mg at 07/24/25 0749    atorvastatin (LIPITOR) tablet 20 mg, 20 mg, Oral, QPM, Carol Hayes PA-C, 20 mg at 07/23/25 2049    bisacodyl (DULCOLAX) suppository 10 mg, 10 mg, Rectal, Daily PRN, Carol Hayes PA-C    carvedilol (COREG) tablet 25 mg, 25 mg, Oral, BID w/meals, Carol Hayes PA-C, 25 mg at 07/24/25 0750    clopidogrel (PLAVIX) tablet 75 mg, 75 mg, Oral, Daily, Carol Hayes PA-C, 75 mg at 07/24/25 0749    glucose gel 15-30 g, 15-30 g, Oral, Q15 Min PRN **OR** dextrose 50 %  injection 25-50 mL, 25-50 mL, Intravenous, Q15 Min PRN **OR** glucagon injection 1 mg, 1 mg, Subcutaneous, Q15 Min PRN, Carol Hayes PA-C    enoxaparin ANTICOAGULANT (LOVENOX) injection 30 mg, 30 mg, Subcutaneous, Q24H, Carol Hayes PA-C, 30 mg at 07/23/25 1608    furosemide (LASIX) tablet 40 mg, 40 mg, Oral, Daily, Carol Hayes PA-C, 40 mg at 07/24/25 0750    hydrALAZINE (APRESOLINE) tablet 10 mg, 10 mg, Oral, 4x Daily PRN, Carol Hayes PA-C, 10 mg at 07/23/25 2253    hydrALAZINE (APRESOLINE) tablet 50 mg, 50 mg, Oral, TID, Carol Hayes PA-C, 50 mg at 07/24/25 0749    insulin aspart (NovoLOG) injection (RAPID ACTING), , Subcutaneous, TID w/meals, Carol Hayes PA-C, 4 Units at 07/24/25 0923    insulin aspart (NovoLOG) injection (RAPID ACTING), , Subcutaneous, With Snacks or Supplements, Carol Hayes PA-C, 3 Units at 07/23/25 2015    insulin aspart (NovoLOG) injection (RAPID ACTING), 1-10 Units, Subcutaneous, TID AC, Carol Hayes PA-C, 5 Units at 07/24/25 0922    insulin aspart (NovoLOG) injection (RAPID ACTING), 1-7 Units, Subcutaneous, At Bedtime, Carol Hayes PA-C, 4 Units at 07/23/25 2253    insulin glargine (LANTUS PEN) injection 12 Units, 12 Units, Subcutaneous, At Bedtime, Carol Hayes PA-C, 12 Units at 07/23/25 2253    melatonin tablet 3 mg, 3 mg, Oral, At Bedtime PRN, Carol Hayes PA-C    polyethylene glycol (MIRALAX) Packet 17 g, 17 g, Oral, Daily PRN, Harper, Carol M, PA-C    senna-docusate (SENOKOT-S/PERICOLACE) 8.6-50 MG per tablet 1 tablet, 1 tablet, Oral, BID PRN, Carol Hayes PA-C    Vitamin D3 (CHOLECALCIFEROL) tablet 50 mcg, 50 mcg, Oral, Daily, Carol Hayes PA-C, 50 mcg at 07/24/25 0749  No current outpatient prescriptions on file.   PMH: DM2, HBP, HLD, CKD4, polycythemia, secondary hyperparathyroidism  Arabella Guzman, PharmD, [facility]  PGY-1 Pharmacy Resident

## 2025-07-24 NOTE — PLAN OF CARE
FOCUS/GOAL  Medical management    ASSESSMENT, INTERVENTIONS AND CONTINUING PLAN FOR GOAL:  Pt is alert and oriented. No complaints of pain. Assist of 1 walker. Continent of bladder using BSC. PVRs completed. VSS. Appeared to be sleeping on rounds. Pt daughter spoke to CN requesting a bigger room. POC ongoing.

## 2025-07-24 NOTE — PROGRESS NOTES
Admitted at end of day shift. Alert and oriented x4, denies pain/SOB. Admission orientation and assessment done. With bruising on R antecubital area and no other skin issues/concerns. BG's checked and insulins given per ordered parameters. BP elevated, scheduled   Hydralazine given and BP monitored. BP elevated at 2245H = 201/67 PRN Hydralazine 10mg given and to recheck later, hand off report given to incoming RN. Continent of both, last bm per report is 7/23. Continent of bladder, used the commode, PVR x1 needed. Able to make her needs known. Used call light appropriately and waited for assistance. Bed alarms on. Daughter requested to stay overnight. And in pt's room.

## 2025-07-24 NOTE — PLAN OF CARE
Goal Outcome Evaluation: Progressing  Patient's BP elevated again this am 179/56, medications given and recheck 137/46. Blood glucoses also elevated. MD is aware. Lantus dose increased for bedtime tonight.   Daughter and her  have been present. Plan to move patient to larger room in 553 when it is clean to provide more space.   Transferred with assist one, walker.  Denied pain.

## 2025-07-24 NOTE — PROGRESS NOTES
07/24/25 0804   Appointment Info   Signing Clinician's Name / Credentials (OT) Cayden Roach OTR/L   Rehab Comments (OT) Chart review initiated 0804; in person appt 0945.   Living Environment   People in Home spouse   Current Living Arrangements house   Home Accessibility stairs to enter home;stairs within home   Number of Stairs, Main Entrance 4   Stair Railings, Main Entrance none   Number of Stairs, Within Home, Primary eight   Stair Railings, Within Home, Primary railings safe and in good condition   Transportation Anticipated family or friend will provide   Living Environment Comments Lives with spouse. 1 adult daughter (Brandi) local; another daughter (Jasmyn) in [place]. All needs able to be met on main level although pt typically uses lower level for laundry, preference for WIS in basement, and office in basement; tub/shower on main level BR. Pt lives w/ spouse; however spouse has been dealing with his own 'illnesses' per pt and daughter and not able to provide physical A.   Self-Care   Usual Activity Tolerance good   Equipment Currently Used at Home cane, straight;walker, rolling;shower chair   Fall history within last six months no   Activity/Exercise/Self-Care Comment Mod I baseline.   Instrumental Activities of Daily Living (IADL)   IADL Comments IND w/ medications both pt and spouse use pill boxes, pt does most of the cooking (daughter reports that pt could be in the kitchen all day long, she enjoys cooking);  they order groceries from [employer],  would typically drive however he hasn't been driving recently d/t reduced mobility endurance so they take Uber and spouse will manage ordering the car via the jamari.   Post-Acute Assessment Only   Post-Acute Functional Assessment See below   Previous Level of Function/Home Environm   Bathing, Previous Functional Level independent;uses device or equipment   Grooming, Previous Functional Level independent   Dressing, Previous Functional Level independent  "  Eating/Feeding, Previous Functional Level independent   Toileting, Previous Functional Level independent   BADLs, Previous Functional Level independent;uses device or equipment   IADLs, Previous Functional Level partial assistance   Bed Mobility, Previous Functional Level independent   Transfers, Previous Functional Level independent;uses device or equipment   Household Ambulation, Previous Functional Level independent;uses device or equipment   Stairs, Previous Functional Level independent   Community Ambulation, Previous Functional Level independent;uses device or equipment   Functional Cognition, Previous Functional Level IND med mgmt, A for driving   General Information   Onset of Illness/Injury or Date of Surgery 07/19/25   Referring Physician Carol Hayes - [facility]   Patient/Family Therapy Goal Statement (OT) \"independent and safe with walking and dressing, bathing, toileting\"   Additional Occupational Profile Info/Pertinent History of Current Problem Per EMR \"Judi Moore is a 69 year old right hand dominant female with a past medical history of HTN, T2DM, HLD, CKD4, chronic lacunar infarcts of bilateral cerebellar hemispheres (on 5/2023 MRI), VANESSA, and OA s/p R TKA (2022) who was admitted on 7/19/25 with acute to subacute left internal capsule ischemic stroke with hospital course complicated by hyperglycemia, elevated BP, polycythemia, and thrombocytopenia.  She is now admitted to ARU on 07/23/25 for multidisciplinary rehabilitation and ongoing medical management.\"   Performance Patterns (Routines, Roles, Habits) right handed   Existing Precautions/Restrictions fall;other (see comments)  (HTN - BP goal <180 for acute phase; long-term goal <130/80 per chart.)   Limitations/Impairments safety/cognitive   Left Upper Extremity (Weight-bearing Status) full weight-bearing (FWB)   Right Upper Extremity (Weight-bearing Status) full weight-bearing (FWB)   Left Lower Extremity (Weight-bearing Status) full " weight-bearing (FWB)   Right Lower Extremity (Weight-bearing Status) full weight-bearing (FWB)   Heart Disease Risk Factors Medical history;Diabetes;High blood pressure   General Observations and Info Mavis Vines present and assisted with elements of information gathering at [facility].   Cognitive Status Examination   Follows Commands follows one-step commands;over 90% accuracy;increased processing time needed   Cognitive Status Comments Potential mild R-inattention noted, visual spatial deficits and positioning of self noted in tasks. Baseline mild memory deficit per daughter and noting increased issues since admission. Recommend further formal and functional assessment.   Visual Perception   Impact of Vision Impairment on Function (Vision) Pt reports recieves regular eye injetcions for retina health and due for appt when hospitalized. Able to functionally read correct time on clock on wall (distance acuity) and attend to near objects WNL.   Sensory   Sensory Comments Baseline N/T in fingertips d/t neuropathy; worsens with BG issues. Light touch intact R and L; proprioception deficit w/ RUE more prominent distally w/ fingers/thumb.   Posture   Posture Comments L side cervical lateral flexion orientation at rest; daughter notes worsened since hospitalization. Cervical AROM appears grossly WFL with brief screen; handoff to PT for more detailed assessment. Noted pt with L side diagonal bias supine in bed when returned at end of session; cues to correct.   Range of Motion Comprehensive   Comment, General Range of Motion BUE WNL   Strength Comprehensive (MMT)   Comment, General Manual Muscle Testing (MMT) Assessment BUE grossly 5/5 MMT; mild strength disparity in R hand vs L but able to maintain . Daughter noting pt sometimes not able to maintain  on feeding utensil with R; recommend  dynanometer in PM and monitor if strength vs attention/sensory contribution.   Hand Strength   Left hand  (pounds) 46    Right hand  (pounds) 52   Coordination   Left hand, nine hole peg test (seconds) 94   Right hand, nine hole peg test (seconds) 104   Coordination Comments Baseline arthritis impairing efficiency with pinch normally; endorses worsened in R hand since CVA. Mild dysmetria with slower performance R side   Balance   Balance Comments CGA FWW in standing LB tasks with pt utilizing UE/BUE   Clinical Impression   Criteria for Skilled Therapeutic Interventions Met (OT) Yes, treatment indicated   OT Diagnosis Impaired ADL, IADL, functional transfers   Influenced by the following impairments cognition, mild R side weakness/incoordination/sensory integration to be further assessed, fatigue   OT Problem List-Impairments impacting ADL problems related to;activity tolerance impaired;balance;cognition;coordination;mobility;motor control;strength;sensation;sensory feedback   Assessment of Occupational Performance 5 or more Performance Deficits   Identified Performance Deficits bathing, dressing, toileting, grooming, self-feeding, functional transfers, IADL   Planned Therapy Interventions (OT) ADL retraining;IADL retraining;balance training;cognition;groups;neuromuscular re-education;ROM;strengthening;transfer training;visual perception;home program guidelines;progressive activity/exercise;risk factor education;other (see comments)  (caregiver and DME education)   Clinical Decision Making Complexity (OT) detailed assessment/moderate complexity   Risk & Benefits of therapy have been explained evaluation/treatment results reviewed;care plan/treatment goals reviewed;risks/benefits reviewed;current/potential barriers reviewed;participants voiced agreement with care plan;participants included;patient;daughter   Clinical Impression Comments Pt admitted s/p L CVA. Previously Mod I ADL and IND with med mgmt and cooking with driving A from spouse; currently requires increased A across transfers and ADL. Performance primarily limited to  mild R side weakness vs inattention/motor planning/proprioception component and cognition impairing coordination, balance, and activity tolerance. Goal to progress to Mod I ADL and transfers with cognitive IADL A available from spouse but no physical A available d/t own medical needs; local adult daughter available for IADL A prn but not daily care. HC OT vs OP OT pending progress.   OT Total Evaluation Time   OT Eval, Moderate Complexity Minutes (87121) 15   OT Goals   Therapy Frequency (OT) 6 times/week   OT Predicted Duration/Target Date for Goal Attainment 07/31/25   OT Goals Hygiene/Grooming;Upper Body Dressing;Lower Body Dressing;Upper Body Bathing;Lower Body Bathing;Toilet Transfer/Toileting;Meal Preparation;Home Management;Cognition;OT Goal 1   OT: Hygiene/Grooming modified independent;within precautions   OT: Upper Body Dressing Modified independent;within precautions   OT: Lower Body Dressing Modified independent;within precautions   OT: Upper Body Bathing Modified independent;using adaptive equipment;within precautions   OT: Lower Body Bathing Modified independent;using adaptive equipment;with precautions   OT: Toilet Transfer/Toileting Modified independent;toilet transfer;cleaning and garment management;within precautions   OT: Meal Preparation Modified independent;with simple meal preparation;within precautions   OT: Home Management Modified independent;with light demand household tasks;within precautions   OT: Cognitive Patient/caregiver will verbalize understanding of cognitive assessment results/recommendations as needed for safe discharge planning   OT: Goal 1 Pt will perform bathing transfer simulating home environment Mod I within precautions utilizing DME prn.   Interventions   Interventions Quick Adds Self-Care/Home Management   Self-Care/Home Management   Self-Care/Home Mgmt/ADL, Compensatory, Meal Prep Minutes (60137) 45   Symptoms Noted During/After Treatment (Meal Preparation/Planning  Training) fatigue   Treatment Detail/Skilled Intervention Daughter present. Evaluation initiated but abbreviated by pt desire to engage in ADL treatment prior to incoming PT appt; plan to complete in PM. Educated on ARU, OT role and POC; daughter concerned re:BP with activity in therapy schedule and educated on formal and symptomatic monitoring t/o. Skilled facilitation of partial ADL routine within precautions with graded assist; see GG. Daughter reporting noting issues with pt self-feeding with R side reporting will sometimes lose  of utensil; modified utensil with coban for padded increased  to A for lunch. Dtr inquiring how to call for NST A; showed call light and explained mechanism of alert, dtr appreciative. Left pt in bed, incoming NST, and dtr attending tot needs. Extra time for vitals monitoring, see flowsheet.   OT Discharge Planning   OT Plan PM - R side FM/sensory/attention assessments to inform ADL treatment POC; Mini ACE   Total Session Time   Timed Code Treatment Minutes 45   Total Session Time (sum of timed and untimed services) 60   Post Acute Settings Only   What unit is patient on? Acute Rehab   OT - Acute Rehab Center Time   Individual Time (minutes) - OT 60   Group Time (minutes) - OT 0   Concurrent Time (minutes) - OT 0   Co-Treatment Time (minutes) - OT 0   ARC Total Session Time (minutes) - OT 60   ARC Daily Total Session Time   OT ARC Daily Total Session Time 60   ARC Daily Rehab Total Minutes 60   Comprehension   Comprehension Comment repetition and slower speech helpful at times   Expression   Expression Comment Heavily accented but able to comprehend >90% of pt speech   Memory   Memory Comment Unable to recall writer's name at end of session   Eating   Completely independent with self-feeding no   Environmental Assistance Needed Set-up assistance of environment   Describe performance assisted to open containers   Bladder   Functional Performance Continent and uses toilet in  "bathroom  (uses commode d/t preference to not use shared bathroom)   Upper Body Dressing   Describe performance Mod A bra with dtr help; dtr assisting with shirt prior to edu on pt completing task on own for assessment, with dtr Min A.   Lower Body Dressing (Pants/Undergarments)   Describe performance Pt threading incorrect leg upon initial attempt; Mod A for threading and then pt able to complete remainder standing CGA FWW   Clothing Utilized Pants  (hospital tie pants)   Toileting Hygiene: Ability to maintain perineal hygiene and adjust clothes   Describe performance CGA FWW, encouragement to trial   Toilet Transfer: standard height (18 inches)   Describe performance Raised commode in room and unable to assess standard surface, CGA FWW from raised platform commode; if standing from 18\" clinical judgement Min-Mod A   Walk 10 Feet   Comment CGA FWW in room to bed <> commode     "

## 2025-07-24 NOTE — PROGRESS NOTES
[facility]   Acute Rehabilitation Unit  Daily progress note    INTERVAL HISTORY  Judi Moore was seen and examined at bedside this morning, with spouse and daughter present.  No acute events reported overnight; although BP and BG remained elevated as they had been prior to transfer to ARU.  She notes right-sided weakness most pronounced in her arm, since her stroke.  She notes some chronic vision deficits, for which she sees a retinal specialist and gets regular injections (missed her last visit on 7/22).  She denies any changes in vision this admission.  She denies numbness/tingling, changes in sensation.  She has noted noticed changes in her speech or cognition, though family feel both are a little slower than baseline.  She denies dizziness or lightheadedness, chest pain/palpitations, shortness of breath, abdominal pain, nausea.  She repots to be having regular bowel movements.  She notes frequent urination, which she attributes to Lasix.  She has slept well the past few nights.  We discussed BP and DM management, and addressed several questions of her and family regarding acute and long-term management of these, especially for secondary stroke prevention.  We also discussed general plans and expectations for ARU stay.  They denied other questions or concerns at this time.    Functionally, therapy evals underway today.     MEDICATIONS  Current Facility-Administered Medications   Medication Dose Route Frequency Provider Last Rate Last Admin    aspirin EC tablet 81 mg  81 mg Oral Daily Carol Hayes PA-C   81 mg at 07/24/25 0749    atorvastatin (LIPITOR) tablet 20 mg  20 mg Oral QPM Carol Hayes PA-C   20 mg at 07/23/25 2049    carvedilol (COREG) tablet 25 mg  25 mg Oral BID w/meals Carol aHyes PA-C   25 mg at 07/24/25 0750    clopidogrel (PLAVIX) tablet 75 mg  75 mg Oral Daily Carol Hayes PA-C   75 mg at 07/24/25 0749    enoxaparin  ANTICOAGULANT (LOVENOX) injection 30 mg  30 mg Subcutaneous Q24H Carol Hayes PA-C   30 mg at 07/23/25 1608    furosemide (LASIX) tablet 40 mg  40 mg Oral Daily Carol Hayes PA-C   40 mg at 07/24/25 0750    hydrALAZINE (APRESOLINE) tablet 50 mg  50 mg Oral TID Carol Hayes PA-C   50 mg at 07/24/25 0749    insulin aspart (NovoLOG) injection (RAPID ACTING)   Subcutaneous TID w/meals Carol Hayes PA-C        insulin aspart (NovoLOG) injection (RAPID ACTING)  1-10 Units Subcutaneous TID AC Carol Hayes PA-C   8 Units at 07/23/25 1603    insulin aspart (NovoLOG) injection (RAPID ACTING)  1-7 Units Subcutaneous At Bedtime Carol Hayes PA-C   4 Units at 07/23/25 2253    insulin glargine (LANTUS PEN) injection 12 Units  12 Units Subcutaneous At Bedtime Carol Hayes PA-C   12 Units at 07/23/25 2253    Vitamin D3 (CHOLECALCIFEROL) tablet 50 mcg  50 mcg Oral Daily Carol Hayes PA-C   50 mcg at 07/24/25 0749          Current Facility-Administered Medications   Medication Dose Route Frequency Provider Last Rate Last Admin    acetaminophen (TYLENOL) tablet 650 mg  650 mg Oral Q4H PRN Carol Hayes PA-C        bisacodyl (DULCOLAX) suppository 10 mg  10 mg Rectal Daily PRN Carol Hayes PA-C        glucose gel 15-30 g  15-30 g Oral Q15 Min PRN Carol Hayes PA-C        Or    dextrose 50 % injection 25-50 mL  25-50 mL Intravenous Q15 Min PRN Carol Hayes PA-C        Or    glucagon injection 1 mg  1 mg Subcutaneous Q15 Min PRN Carol Hayes PA-C        hydrALAZINE (APRESOLINE) tablet 10 mg  10 mg Oral 4x Daily PRN Carol Hayes PA-C   10 mg at 07/23/25 2253    insulin aspart (NovoLOG) injection (RAPID ACTING)   Subcutaneous With Snacks or Supplements Carol Hayes PA-C   3 Units at 07/23/25 2015    melatonin tablet 3 mg  3 mg Oral At Bedtime PRN Carol Hayes PA-C        polyethylene glycol (MIRALAX) Packet 17 g   "17 g Oral Daily PRN Carol Hayes PA-C        senna-docusate (SENOKOT-S/PERICOLACE) 8.6-50 MG per tablet 1 tablet  1 tablet Oral BID PRN Carol Hayes PA-C            PHYSICAL EXAM  BP (!) 179/56 (BP Location: Right arm)   Pulse 68   Temp 97.8  F (36.6  C) (Oral)   Resp 16   Ht 1.575 m (5' 2\")   Wt 83.6 kg (184 lb 4.9 oz)   SpO2 92%   BMI 33.71 kg/m    Gen: NAD, sitting up in bed  HEENT: NC/AT, MMM  Cardio: RRR, no murmurs  Pulm: non-labored on room air, lungs CTA bilaterally  Abd: soft, non-tender, non-distended, bowel sounds present  Ext: no edema in BLE  Neuro/MSK: awake, alert, PERRL, EOMI, face symmetric, speech clear/fluent, moving all extremities actively in bed    LABS  CBC RESULTS:   Recent Labs   Lab Test 07/24/25  0606 07/23/25  0855 07/21/25  1100   WBC 6.1 5.6 6.0   RBC 5.01 5.15 5.32*   HGB 14.7 15.5 15.8*   HCT 44.4 46.4 47.5*   MCV 89 90 89   MCH 29.3 30.1 29.7   MCHC 33.1 33.4 33.3   RDW 12.6 12.6 12.6   * 128* 124*       Last Basic Metabolic Panel:  Recent Labs   Lab Test 07/24/25  0751 07/24/25  0606 07/24/25  0209 07/23/25  1149 07/23/25  0855 07/21/25  2145 07/21/25  1914   NA  --  138  --   --  141  --  135   POTASSIUM  --  3.7  --   --  3.6  --  4.3   CHLORIDE  --  103  --   --  106  --  100   CO2  --  21*  --   --  23  --  23   ANIONGAP  --  14  --   --  12  --  12   * 306* 342*   < > 209*   < > 335*   BUN  --  45.4*  --   --  42.0*  --  38.6*   CR  --  1.89*  --   --  1.83*  --  1.66*   GFRESTIMATED  --  28*  --   --  29*  --  33*   JURGEN  --  9.2  --   --  9.6  --  9.4    < > = values in this interval not displayed.        ASSESSMENT AND PLAN  Judi Moore is a 69 year old right hand dominant female with a past medical history of HTN, T2DM, HLD, CKD4, chronic lacunar infarcts of bilateral cerebellar hemispheres (on 5/2023 MRI), VANESSA, and OA s/p R TKA (2022) who was admitted on 7/19/25 with acute to subacute left internal capsule ischemic stroke with " hospital course complicated by hyperglycemia, elevated BP, polycythemia, and thrombocytopenia.  She is now admitted to ARU on 07/23/25 for multidisciplinary rehabilitation and ongoing medical management.      Rehabilitation   Admission to acute inpatient rehab 07/23/25.    Impairment group code: Stroke Ischemic 01.2 (R) Body Involvement (L) Brain; Acute to subacute left internal capsule ischemic stroke, etiology likely small vessel disease vs ESUS         PT and OT 90 minutes of each daily for 6 days per week for 10 days, in addition to rehab nursing and close management of physiatrist.       Impairment of ADL's: Noted to have L hemiplegia, facial weakness, impaired activity tolerance, impaired balance, impaired coordination, impaired sensation, and impaired weight shifting, all affecting her ability to safely and independently perform basic ADLs.  Goal for mod I with basic ADLs with exception of SBA for bathing.     Impairment of mobility:  Noted to have L hemiplegia, facial weakness, impaired activity tolerance, impaired balance, impaired coordination, impaired sensation, and impaired weight shifting, all affecting her ability to safely and independently perform basic mobility.  Goal for mod I with basic mobility with exception of SBA for stairs.    Continue comprehensive acute inpatient rehabilitation program with multidisciplinary approach including therapies, rehab nursing, and physiatry following. See interval history for updates.       Medical Conditions  New actions/orders/updates for today are in blue.     Acute to subacute left internal capsule ischemic stroke, etiology likely small vessel disease   History of prior cerebellar stroke (MRI 2023)  - SBP goal <130/80 long-term.  Management as below  - Continue DAPT with ASA 81 mg daily + Plavix 75 mg daily x21 days, followed by ASA 81 mg daily  - LDL goal 40-70, management as below  - Goal A1c<7, management as below  - Continue PT, OT, SLP  - Follow up with  stroke RAY in 6-8 weeks     Hypertension  PTA on Coreg 37.5 mg BID, Lasix 40 mg daily, hydralazine 75 mg TID, losartan 100 mg daily  Elevated BP on arrival, initially allowed permissive hypertension but home meds gradually resumed.  BP elevated on ARU admission, but also note that inpatient team may multiple dose adjustments just today, so make take some time to take effect.  - Hospitalist consulted for co-management, appreciate assistance  - SBP quite elevated up to 200 last night, but this morning down to 120.  Given multiple changes just yesterday, want to avoid rapid reduction.  Discussed with hospitalist and in agreement with this, awaiting formal recs.  - BP goal <180 for acute phase; long-term goal <130/80.   - Continue Coreg 25 mg BID, increased on 7/23.  Continue to increase back to PTA dose as indicated/tolerated  - Continue PTA Lasix 40 mg daily (resumed on 7/23)  - Continue hydralazine 50 mg TID (resumed on 7/23).  Continue to increase back to PTA dose as indicated/tolerated.  - Still holding PTA Losartan, resume as indicated/tolerated  - Hydralazine 10 mg QID PRN for SBP >180  - Monitor BP, adjust regimen as indicated in partnership with medicine team     Hyperlipidemia  PTA on atorvastatin 10 mg daily, increased this admission with LDL 80  - Continue atorvastatin 20 mg daily     Type II diabetes mellitus  PTA on Novolog sliding scale insulin TID AC, carb coverage (1:13 breakfast, 1:20 lunch, 1:7 dinner), and glargine 10 units at HS  A1c 7.7%  BG quite elevated this admission, frequently into the 300s.  Seems patient has been resistant to recommendations to increase long-acting insulin dose despite requiring very high doses of short-acting insulin, poorly controlled BG, education on 50:50 basal/bolus recommendations.  Was agreeable to small increase just prior to discharge to ARU.  - Hospitalist consulted for co-management, appreciate assistance  - Monitor BG TID AC + HS + 0200.  BG remains elevated  220-342 past 24 hours.  Discussed with patient and family and seemed agreeable to ongoing gradual increase in long-acting insulin to aim for 50:50 basal/bolus and improved glycemic control  - Increase Lantus to 16 units at HS  - Continue Novolog high intensity sliding scale insulin TID AC + HS  - Continue Novolog 1:10 g CHO TID AC  - Moderate consistent carb diet  - Hypoglycemia protocol  - Consider endocrinology consult if BG remains poorly controlled     CKD IV  Secondary hyperparathyroidism   Follows with [facility].  Suspected 2/2 diabetic nephropathy +/- HTN nephrosclerosis.  Baseline Cr ~1.6-2.0.  During this admission, stable in 1.6-1.8 range.  7/24: Cr up slightly at 1.89, though still in chronic baseline range  - Avoid/limit nephrotoxins as able  - Repeat BMP on Monday     VANESSA  Does not tolerate CPAP  - Nocturnal hypoxia may contribute to vascular risk--consider alternate therapy in follow-up.      Polycythemia  Incidental finding.  Hgb 17.0 on admission.  Evaluated by hematology during this admission and additional labs were sent.  EPO WNL.  Iron, ferritin, sat index WNL; IBC low.  Peripheral smear unremarkable for any abnormal cells.  Hgb nearly WNL by discharge to ARU, sending team notes possible dehydration at admission contributing.  - JAK2 mutation labs pending.  Per hematology, results likely to take several weeks.  If positive, they will start on hydroxyurea for polycythemia vera  7/24: Hgb WNL 14.7  - Repeat CBC on Monday  - Follow up with hematology      Thrombocytopenia  Platelets low throughout admission, stable at 128 on 7/23 7/24: platelets stable/mildly low at 123  - Repeat CBC Monday     Adjustment to disability:  Clinical psychology to eval and treat if indicated  FEN: mod consistent CHO diet, regular texture, thin liquids  Bowel: continent with some functional constipation.  Has PRN bowel meds available.  Monitor and adjust regimen as indicated.  Bladder: mixed continence.   PVRs at admission without evidence of retention  DVT Prophylaxis: subcutaneous Lovenox  GI Prophylaxis: none, consider adding PPI given age + DAPT + Lovenox  Code: full; confirmed on admission  Disposition: goal for home  ELOS:  12 days pending therapy evals  Follow up Appointments on Discharge: PCP in 1-2 weeks, neurology with stroke RAY in 6-8 weeks, hematology, PM&R in 8-12 weeks      35 minutes spent on the date of service doing chart review, history and exam, documentation, and further activities as noted above.       Patient was discussed with Dr. Alexey Pike, Hospitalist Staff Physician    Carol Hayes PA-C  Physical Medicine & Rehabilitation

## 2025-07-24 NOTE — CONSULTS
Social Work: Initial Assessment with Discharge Plan    Patient Name: Judi Moore  : 1955  Age: 69 year old  MRN: [id number]  Completed assessment with: Chart review and interview with patient.   Admitted to ARU: 2025    Presenting Information   Date of SW assessment: 2025  Health Care Directive: provided education and copy   Primary Health Care Agent: Patient/self.   Secondary Health Care Agent: none appointed    Living Situation:  lives with spouse in house with 4 JOSE LUIS   Previous Functional Status: Pt was modified independent with all ADLs/IADLs, transfers, mobility and gait with cane.  Her spouse does driving and they order groceries from [employer].  DME available: cane, straight, shower chair. See therapy evaluation for more information   Patient and family understanding of hospitalization: Appropriate and Pleasant.  Cultural/Language/Spiritual Considerations: 69 year female, English Speaking and      Physical Health  Reason for admission: Stroke Ischemic 01.2 (R) Body Involvement (L) Brain; Acute to subacute left internal capsule ischemic stroke, etiology likely small vessel disease vs ESUS.Judi Moore is a 69 year old right hand dominant female with past medical history of HTN, T2DM, HLD, CKD4, chronic lacunar infarcts of bilateral cerebellar hemispheres (on 2023 MRI), VANESSA, and OA s/p R TKA () who presented on 25 with several day history of dizziness, ataxia, right-sided weakness, and elevated BP (despite compliance with medications).  CT scan of the head did not show any acute intracranial pathology.  CTA was deferred given impaired kidney function.  She was not a candidate for tenecteplase or thrombectomy given symptoms starting multiple days prior to admission.  MRI brain showed acute to subacute left internal capsule ischemic stroke. MRA head/neck with no large vessel occlusion, 50 to 60%, right ICA stenosis.      Additional stroke evaluation with echo  with EF 55-60%, mild to moderate LVH; EKG with sinus rhythm; LDL 80; A1c 7.7%.  Stroke etiology felt to be small vessel disease.  Dual antiplatelet therapy with aspirin and Plavix was recommended for 21 days, followed by aspirin 81 mg daily ongoing.  Her prior to admission statin dose was increased.  Her anti-hypertensive medications were gradually resumed and insulin was adjusted.  However, she continued to have elevated blood pressure and blood glucose readings even upon transfer to ARU.     Hospital course was further complicated by hyperglycemia, elevated BP, polycythemia (hematology consulted), and thrombocytopenia.     During acute hospitalization, patient was seen and evaluated by PT and OT, who collectively recommended that patient would benefit from ongoing therapies in the acute inpatient rehabilitation setting.         Provider Information   Primary Care Physician:Abelardo Pickard   [facility] will schedule PCP apt at discharge.   : none appointed    Mental Health/Chemical Dependency:   Diagnosis: Patient reports stable mood  Alcohol/Tobacco/Narcotis: none reported  Support/Services in Place: none reported  Services Needed/Recommended: [place] and Health Psychology support while on ARU available.   Sexuality/Intimacy: Not discussed     Support System  Marital Status:    Family support: spouse, adult [place] in [place] and another who lives out of town   Other support available: ***      Community Resources  Current in home services: none reported  Previous services: Social support, and medications       Financial/Employment/Education  Employment Status: Not working   Income Source: no incomes; just income from spouse   Education: Not discussed   Financial Concerns:  financial stress from bills  Insurance: [facility] MEDICARE ADVANTAGE     ------------------------------------------------------------------------------------------------------------  SANDRA Pain Assessment    Pain Effect  "on Sleep  Over the past 5 days, how much of the time has pain made it hard for you to sleep at night?\"    0. Does not apply - I have not had any pain or hurting in the past 5 days    Pain Interference with Therapy Activities  \"Over the past 5 days, how often have you limited your participation in rehabilitation therapy sessions due to pain?\"  0. Does not apply - I have not had any pain or hurting in the past 5 days      -------------------------------------------------------------------------------------------------------------    TRANSPORTATION:    Has lack of transportation kept you from medical appointments, meetings, work, or from getting things needed for daily living?  A. Yes, it has kept me from medical appointments or from getting my medications  B. Yes, it has kept me from non-medical meetings, appointments, work or from getting things that I need  C. No  X. Patient Unable to respond  Y. Patient declines to respond  -------------------------------------------------------------------------------------------------------------  Health Literacy:   How Often do you need to have someone help you when you read instructions, pamphlets, or other written material from your doctor or pharmacy?  0.       Never  1.       Rarely  2.       Sometimes  3.       Often  4.       Always  5.       Patient declines to respond  6.       Patient unable to respond  ------------------------------------------------------------------------------------------------------------    SOCIAL ISOLATION  How often do you feel lonely or isolated from those around you?  0.       Never  1.       Rarely  2.       Sometimes  3.       Often  4.       Always  5.       Patient declines to respond  6.       Patient unable to respond  -------------------------------------------------------------------------------------------------------------    Discharge Plan     Patient and family discharge goal: To be determined, pending progress  Provided Education " on discharge plan: Evaluations and discharge recommendations pending.   Patient agreeable to discharge plan:  Pending further discussion. Evaluations and discharge recommendations pending.   Provided education and attained signature for Medicare IM and IRF Patient Rights and Privacy Information provided to patient : Yes  Provided patient with [place] [facility] [place] Resources: Yes  Barriers to discharge: to be determined     Discharge Recommendations   Disposition: Home  Transportation Needs: Patient, family/friends, paid transport, insurance transport (if applicable)   Name of Transportation Company and Phone: TBD    Additional comments   Discharge TBD, ELOS 12 days. Evals and discharge needs pending.     SW met with pt at bedside and introduced self and role. Patient's spouse and daughter were at bedside. Patient lives with her  in a house. She does not drive; her  drives but not so much since he has been ill.  She says they order groceries from [employer].    Patient and family express frustrations with our social system.   provided stroke book, medical assistance application,blank health care directive and power of  paperwork.  Would like help in calling the social security office.     SW will continue to remain available for patient and family support, discharge planning, and access to resources.      [facility], Acute Inpatient Rehab Unit   [facility] [address] [place] [address]  Phone: [phone]  Fax: [phone]

## 2025-07-24 NOTE — PROGRESS NOTES
Discharge Planner Post-Acute Rehab OT:     Discharge Plan: home with spouse, HC vs OP    Precautions: fall, HTN (SBP <180)    Current Status:  ADLs:  Mobility: CGA FWW  Grooming: NT  Dressing: UB - Min A. LB - Mod A. Feet - NT  Bathing: NT  Toileting: CGA FWW <> raised platform commode (declines use of toilet d/t shared bathroom). CGA FWW cares.  IADLs: Baseline IND med mgmt and cooking; shopping on [employer]; spouse typically drives but using [employer] more recently d/t reduced mobility. Discharge A recommendations TBD.   Vision/Cognition: WFL, injections baseline for retina health. Mild memory deficit baseline per dtr. Mild R inattention, increased memory deficit noted. Recommend formal assessment and monitor of functional performance.    Assessment: Pt admitted s/p L CVA. Previously Mod I ADL and IND with med mgmt and cooking with driving A from spouse; currently requires increased A across transfers and ADL. Performance primarily limited to mild R side weakness vs inattention/motor planning/proprioception component and cognition impairing coordination, balance, and activity tolerance. Goal to progress to Mod I ADL and transfers with cognitive IADL A available from spouse if needed but no physical A available d/t own medical needs; local adult daughter available for IADL A prn but not daily care. HC OT vs OP OT pending progress. ELOS ~8 days.     Other Barriers to Discharge (DME, Family Training, etc):   Level of physical A.    Social support: Lives with spouse (able to provide cognitive but not physical A); daughter Brandi lives locally but unable to provide daily assistance. Daughter Jasmyn lives in [place].    DME: Owns cane, FWW, shower chair.    Home environment: Able to live on main level with tub/shower BR; typically showers basement level WIS.

## 2025-07-24 NOTE — PROGRESS NOTES
07/24/25 0804   Appointment Info   Signing Clinician's Name / Credentials (OT) Cayden Roach OTR/L   Rehab Comments (OT) Chart review initiated 0804; in person appt 0945.   Living Environment   People in Home spouse   Current Living Arrangements house   Home Accessibility stairs to enter home;stairs within home   Number of Stairs, Main Entrance 4   Stair Railings, Main Entrance none   Number of Stairs, Within Home, Primary eight   Stair Railings, Within Home, Primary railings safe and in good condition   Transportation Anticipated family or friend will provide   Living Environment Comments Lives with spouse. 1 adult daughter (Brandi) local; another daughter (Jasmyn) in [place]. All needs able to be met on main level although pt typically uses lower level for laundry, preference for WIS in basement, and office in basement; tub/shower on main level BR. Pt lives w/ spouse; however spouse has been dealing with his own 'illnesses' per pt and daughter and not able to provide physical A.   Self-Care   Usual Activity Tolerance good   Equipment Currently Used at Home cane, straight;walker, rolling;shower chair   Fall history within last six months no   Activity/Exercise/Self-Care Comment Mod I baseline.   Instrumental Activities of Daily Living (IADL)   IADL Comments IND w/ medications both pt and spouse use pill boxes, pt does most of the cooking (daughter reports that pt could be in the kitchen all day long, she enjoys cooking);  they order groceries from [employer],  would typically drive however he hasn't been driving recently d/t reduced mobility endurance so they take Uber and spouse will manage ordering the car via the jamari.   Post-Acute Assessment Only   Post-Acute Functional Assessment See below   Previous Level of Function/Home Environm   Bathing, Previous Functional Level independent;uses device or equipment   Grooming, Previous Functional Level independent   Dressing, Previous Functional Level independent  "  Eating/Feeding, Previous Functional Level independent   Toileting, Previous Functional Level independent   BADLs, Previous Functional Level independent;uses device or equipment   IADLs, Previous Functional Level partial assistance   Bed Mobility, Previous Functional Level independent   Transfers, Previous Functional Level independent;uses device or equipment   Household Ambulation, Previous Functional Level independent;uses device or equipment   Stairs, Previous Functional Level independent   Community Ambulation, Previous Functional Level independent;uses device or equipment   Functional Cognition, Previous Functional Level IND med mgmt, A for driving   General Information   Onset of Illness/Injury or Date of Surgery 07/19/25   Referring Physician Carol Hayes - [facility]   Patient/Family Therapy Goal Statement (OT) \"independent and safe with walking and dressing, bathing, toileting\"   Additional Occupational Profile Info/Pertinent History of Current Problem Per EMR \"Judi Moore is a 69 year old right hand dominant female with a past medical history of HTN, T2DM, HLD, CKD4, chronic lacunar infarcts of bilateral cerebellar hemispheres (on 5/2023 MRI), VANESSA, and OA s/p R TKA (2022) who was admitted on 7/19/25 with acute to subacute left internal capsule ischemic stroke with hospital course complicated by hyperglycemia, elevated BP, polycythemia, and thrombocytopenia.  She is now admitted to ARU on 07/23/25 for multidisciplinary rehabilitation and ongoing medical management.\"   Performance Patterns (Routines, Roles, Habits) right handed   Existing Precautions/Restrictions fall;other (see comments)  (HTN - BP goal <180 for acute phase; long-term goal <130/80 per chart.)   Limitations/Impairments safety/cognitive   Left Upper Extremity (Weight-bearing Status) full weight-bearing (FWB)   Right Upper Extremity (Weight-bearing Status) full weight-bearing (FWB)   Left Lower Extremity (Weight-bearing Status) full " weight-bearing (FWB)   Right Lower Extremity (Weight-bearing Status) full weight-bearing (FWB)   Heart Disease Risk Factors Medical history;Diabetes;High blood pressure   General Observations and Info Mavis Vines present and assisted with elements of information gathering at [facility].   Cognitive Status Examination   Follows Commands follows one-step commands;over 90% accuracy;increased processing time needed   Cognitive Status Comments Potential mild R-inattention noted, visual spatial deficits and positioning of self noted in tasks. Baseline mild memory deficit per daughter and noting increased issues since admission. Recommend further formal and functional assessment.   Visual Perception   Impact of Vision Impairment on Function (Vision) Pt reports recieves regular eye injetcions for retina health and due for appt when hospitalized. Able to functionally read correct time on clock on wall (distance acuity) and attend to near objects WNL.   Sensory   Sensory Comments Light touch intact R and L; recommend further assessment in light of coordination deficits with R side. Noted proprioception deficit in acute eval.   Posture   Posture Comments L side cervical lateral flexion orientation at rest; daughter notes worsened since hospitalization. Cervical AROM appears grossly WFL with brief screen; handoff to PT for more detailed assessment. Noted pt with L side diagonal bias supine in bed when returned at end of session; cues to correct.   Range of Motion Comprehensive   Comment, General Range of Motion BUE WNL   Strength Comprehensive (MMT)   Comment, General Manual Muscle Testing (MMT) Assessment BUE grossly 5/5 MMT; mild strength disparity in R hand vs L but able to maintain . Daughter noting pt sometimes not able to maintain  on feeding utensil with R; recommend  dynanometer in PM and monitor if strength vs attention/sensory contribution.   Coordination   Coordination Comments Mild dysmetria with slower  performance R side   Balance   Balance Comments CGA FWW in standing LB tasks with pt utilizing UE/BUE   Clinical Impression   Criteria for Skilled Therapeutic Interventions Met (OT) Yes, treatment indicated   OT Diagnosis Impaired ADL, IADL, functional transfers   Influenced by the following impairments cognition, mild R side weakness/incoordination/sensory integration to be further assessed, fatigue   OT Problem List-Impairments impacting ADL problems related to;activity tolerance impaired;balance;cognition;coordination;mobility;motor control;strength;sensation;sensory feedback   Assessment of Occupational Performance 5 or more Performance Deficits   Identified Performance Deficits bathing, dressing, toileting, grooming, self-feeding, functional transfers, IADL   Planned Therapy Interventions (OT) ADL retraining;IADL retraining;balance training;cognition;groups;neuromuscular re-education;ROM;strengthening;transfer training;visual perception;home program guidelines;progressive activity/exercise;risk factor education;other (see comments)  (caregiver and DME education)   Clinical Decision Making Complexity (OT) detailed assessment/moderate complexity   Risk & Benefits of therapy have been explained evaluation/treatment results reviewed;care plan/treatment goals reviewed;risks/benefits reviewed;current/potential barriers reviewed;participants voiced agreement with care plan;participants included;patient;daughter   Clinical Impression Comments Pt admitted s/p L CVA. Previously Mod I ADL and IND with med mgmt and cooking with driving A from spouse; currently requires increased A across transfers and ADL. Performance primarily limited to mild R side weakness vs inattention/motor planning/proprioception component and cognition impairing coordination, balance, and activity tolerance. Goal to progress to Mod I ADL and transfers with cognitive IADL A available from spouse but no physical A available d/t own medical needs;  local adult daughter available for IADL A prn but not daily care. HC OT vs OP OT pending progress.   OT Total Evaluation Time   OT Eval, Moderate Complexity Minutes (17385) 15   OT Goals   Therapy Frequency (OT) 6 times/week   OT Predicted Duration/Target Date for Goal Attainment 07/31/25   OT Goals Hygiene/Grooming;Upper Body Dressing;Lower Body Dressing;Upper Body Bathing;Lower Body Bathing;Toilet Transfer/Toileting;Meal Preparation;Home Management;Cognition;OT Goal 1   OT: Hygiene/Grooming modified independent;within precautions   OT: Upper Body Dressing Modified independent;within precautions   OT: Lower Body Dressing Modified independent;within precautions   OT: Upper Body Bathing Modified independent;using adaptive equipment;within precautions   OT: Lower Body Bathing Modified independent;using adaptive equipment;with precautions   OT: Toilet Transfer/Toileting Modified independent;toilet transfer;cleaning and garment management;within precautions   OT: Meal Preparation Modified independent;with simple meal preparation;within precautions   OT: Home Management Modified independent;with light demand household tasks;within precautions   OT: Cognitive Patient/caregiver will verbalize understanding of cognitive assessment results/recommendations as needed for safe discharge planning   OT: Goal 1 Pt will perform bathing transfer simulating home environment Mod I within precautions utilizing DME prn.   Interventions   Interventions Quick Adds Self-Care/Home Management   Self-Care/Home Management   Self-Care/Home Mgmt/ADL, Compensatory, Meal Prep Minutes (23356) 45   Symptoms Noted During/After Treatment (Meal Preparation/Planning Training) fatigue   Treatment Detail/Skilled Intervention Daughter present. Evaluation initiated but abbreviated by pt desire to engage in ADL treatment prior to incoming PT appt; plan to complete in PM. Educated on ARU, OT role and POC; daughter concerned re:BP with activity in therapy  schedule and educated on formal and symptomatic monitoring t/o. Skilled facilitation of partial ADL routine within precautions with graded assist; see GG. Daughter reporting noting issues with pt self-feeding with R side reporting will sometimes lose  of utensil; modified utensil with coban for padded increased  to A for lunch. Dtr inquiring how to call for NST A; showed call light and explained mechanism of alert, dtr appreciative. Left pt in bed, incoming NST, and dtr attending tot needs. Extra time for vitals monitoring, see flowsheet.   OT Discharge Planning   OT Plan PM - R side FM/sensory/attention assessments to inform ADL treatment POC; Mini ACE   Total Session Time   Timed Code Treatment Minutes 45   Total Session Time (sum of timed and untimed services) 60   Post Acute Settings Only   What unit is patient on? Acute Rehab   OT - Acute Rehab Center Time   Individual Time (minutes) - OT 60   Group Time (minutes) - OT 0   Concurrent Time (minutes) - OT 0   Co-Treatment Time (minutes) - OT 0   ARC Total Session Time (minutes) - OT 60   ARC Daily Total Session Time   OT ARC Daily Total Session Time 60   ARC Daily Rehab Total Minutes 60   Comprehension   Comprehension Comment repetition and slower speech helpful at times   Expression   Expression Comment Heavily accented but able to comprehend >90% of pt speech   Memory   Memory Comment Unable to recall writer's name at end of session   Bladder   Functional Performance Continent and uses toilet in bathroom  (uses commode d/t preference to not use shared bathroom)   Upper Body Dressing   Describe performance Mod A bra with dtr help; dtr assisting with shirt prior to edu on pt completing task on own for assessment, with dtr Min A.   Lower Body Dressing (Pants/Undergarments)   Describe performance Pt threading incorrect leg upon initial attempt; Mod A for threading and then pt able to complete remainder standing CGA FWW   Clothing Utilized Pants  (\[facility]\""  "tie pants)   Toileting Hygiene: Ability to maintain perineal hygiene and adjust clothes   Describe performance CGA FWW, encouragement to trial   Toilet Transfer: standard height (18 inches)   Describe performance Raised commode in room and unable to assess standard surface, CGA FWW from raised platform commode; if standing from 18\" clinical judgement Min-Mod A   Walk 10 Feet   Comment CGA FWW in room to bed <> commode     "

## 2025-07-25 ENCOUNTER — APPOINTMENT (OUTPATIENT)
Dept: PHYSICAL THERAPY | Facility: CLINIC | Age: 70
DRG: 057 | End: 2025-07-25
Attending: PHYSICAL MEDICINE & REHABILITATION
Payer: COMMERCIAL

## 2025-07-25 ENCOUNTER — APPOINTMENT (OUTPATIENT)
Dept: OCCUPATIONAL THERAPY | Facility: CLINIC | Age: 70
DRG: 057 | End: 2025-07-25
Attending: PHYSICAL MEDICINE & REHABILITATION
Payer: COMMERCIAL

## 2025-07-25 LAB
GLUCOSE BLDC GLUCOMTR-MCNC: 156 MG/DL (ref 70–99)
GLUCOSE BLDC GLUCOMTR-MCNC: 170 MG/DL (ref 70–99)
GLUCOSE BLDC GLUCOMTR-MCNC: 217 MG/DL (ref 70–99)
GLUCOSE BLDC GLUCOMTR-MCNC: 280 MG/DL (ref 70–99)
GLUCOSE BLDC GLUCOMTR-MCNC: 285 MG/DL (ref 70–99)

## 2025-07-25 PROCEDURE — 97535 SELF CARE MNGMENT TRAINING: CPT | Mod: GO

## 2025-07-25 PROCEDURE — 99207 PR SC NO CHARGE VISIT/PATIENT NOT SEEN: CPT | Performed by: NURSE PRACTITIONER

## 2025-07-25 PROCEDURE — 97750 PHYSICAL PERFORMANCE TEST: CPT | Mod: GP

## 2025-07-25 PROCEDURE — 99231 SBSQ HOSP IP/OBS SF/LOW 25: CPT | Performed by: STUDENT IN AN ORGANIZED HEALTH CARE EDUCATION/TRAINING PROGRAM

## 2025-07-25 PROCEDURE — 250N000013 HC RX MED GY IP 250 OP 250 PS 637: Performed by: PHYSICIAN ASSISTANT

## 2025-07-25 PROCEDURE — 250N000013 HC RX MED GY IP 250 OP 250 PS 637: Performed by: INTERNAL MEDICINE

## 2025-07-25 PROCEDURE — 97110 THERAPEUTIC EXERCISES: CPT | Mod: GP

## 2025-07-25 PROCEDURE — 97116 GAIT TRAINING THERAPY: CPT | Mod: GP

## 2025-07-25 PROCEDURE — 99232 SBSQ HOSP IP/OBS MODERATE 35: CPT | Performed by: INTERNAL MEDICINE

## 2025-07-25 PROCEDURE — 128N000003 HC R&B REHAB

## 2025-07-25 PROCEDURE — 250N000011 HC RX IP 250 OP 636: Performed by: PHYSICIAN ASSISTANT

## 2025-07-25 PROCEDURE — 97530 THERAPEUTIC ACTIVITIES: CPT | Mod: GP

## 2025-07-25 RX ADMIN — ATORVASTATIN CALCIUM 20 MG: 10 TABLET, FILM COATED ORAL at 21:30

## 2025-07-25 RX ADMIN — CLOPIDOGREL BISULFATE 75 MG: 75 TABLET, FILM COATED ORAL at 09:00

## 2025-07-25 RX ADMIN — ASPIRIN 81 MG: 81 TABLET, DELAYED RELEASE ORAL at 09:01

## 2025-07-25 RX ADMIN — FUROSEMIDE 40 MG: 40 TABLET ORAL at 09:01

## 2025-07-25 RX ADMIN — HYDRALAZINE HYDROCHLORIDE 75 MG: 50 TABLET ORAL at 14:13

## 2025-07-25 RX ADMIN — Medication 50 MCG: at 09:01

## 2025-07-25 RX ADMIN — CARVEDILOL 25 MG: 25 TABLET, FILM COATED ORAL at 09:00

## 2025-07-25 RX ADMIN — INSULIN ASPART 3 UNITS: 100 INJECTION, SOLUTION INTRAVENOUS; SUBCUTANEOUS at 19:11

## 2025-07-25 RX ADMIN — CARVEDILOL 25 MG: 25 TABLET, FILM COATED ORAL at 18:29

## 2025-07-25 RX ADMIN — INSULIN ASPART 7 UNITS: 100 INJECTION, SOLUTION INTRAVENOUS; SUBCUTANEOUS at 09:02

## 2025-07-25 RX ADMIN — HYDRALAZINE HYDROCHLORIDE 75 MG: 50 TABLET ORAL at 21:30

## 2025-07-25 RX ADMIN — INSULIN ASPART 3 UNITS: 100 INJECTION, SOLUTION INTRAVENOUS; SUBCUTANEOUS at 14:12

## 2025-07-25 RX ADMIN — ENOXAPARIN SODIUM 30 MG: 30 INJECTION SUBCUTANEOUS at 16:15

## 2025-07-25 ASSESSMENT — ACTIVITIES OF DAILY LIVING (ADL)
ADLS_ACUITY_SCORE: 51

## 2025-07-25 NOTE — PROGRESS NOTES
Discharge Planner Post-Acute Rehab OT:     Discharge Plan: Mod I ADL at home with spouse, HC    Precautions: fall, HTN (SBP <180)    Current Status:  ADLs:  Mobility: SBA FWW  Grooming: SBA FWW  Dressing: UB - Min A. LB - Min-Mod A. Feet - Total A.  Bathing: Transfer CGA <> ETB with grab bar/FWW. SBA tasks.  Toileting: SBA FWW <> toilet with gb. CGA FWW cares.  IADLs: Baseline IND med mgmt and cooking; shopping on [employer]; spouse typically drives but using Uber more recently d/t reduced mobility. Discharge A recommendations TBD.   Vision/Cognition: WFL, injections baseline for retina health. Mild memory deficit baseline per dtr. Mild R inattention, increased memory deficit noted. Recommend formal assessment and monitor of functional performance.    Assessment: Completed shower ADL assessment; see above functional levels. Improved SBP today's sessions ~140 post-activities. Family with communicate with local daughter on availability to coordinate discharge date targeting middle of next week.    Other Barriers to Discharge (DME, Family Training, etc):   Level of physical A.    Social support: Lives with spouse (able to provide cognitive but not physical A); daughter Brandi lives locally but works/childcare and unable to provide daily assistance. Daughter Jasmyn lives in [place].    DME: Owns cane, FWW, shower chair. Recommend grab bars in either shower space.    Home environment: Able to live on main level with tub/shower BR; typically showers basement level WIS.

## 2025-07-25 NOTE — PROGRESS NOTES
ARU Social Work Progress Notes     Accepting Home Care Agency    Home Health Care:  [facility]  PH: [phone]  Fax:[phone]  Nurse, physical therapy and occupational therapy  -You will get a call after you have discharged from [facility] to schedule the first home care visit. The home health nurse should come out within the first 24-48 hours. If you don't get a call from them within the first 48 hours, please call to follow up.         TRACY Olivares  [facility], Acute Inpatient Rehab Unit   [address] [place] [address], [address] [place] [address]  Phone: [phone]  Fax: [phone]

## 2025-07-25 NOTE — PROGRESS NOTES
ARU Social Work Progress Notes       Team huddle today; targeting a discharge on 07/31 or sooner with home care services. Home Care referral sent and waiting on accepting home care agency.      TRACY Olivares  [facility], Acute Inpatient Rehab Unit   [address] [place] [address], [address] [place] [address]  Phone: [phone]  Fax: [phone]

## 2025-07-25 NOTE — PROGRESS NOTES
[facility]    Medicine Progress Note - Hospitalist Service    Date of Admission:  7/23/2025    Pt was seen, course reviewed with family and team.    Events for today (7/25)  Increase Hydralazine from 50 mg tid to 75 mg tid--if renal function stable, will gradually resume PTA losartan (was receiving 100 mg daily)  Will increase carb counting to 1 unit per 8 gram (from 10)  Will ask DM consult team to see in view of hyperglycemia, unorthodox home regimen. Pt and family also desire this    Assessment & Plan     Judi Moore is a 69 year old female admitted on 7/23/2025. She A 69-year-old female with a complex medical history, including type 2 diabetes, hypertension, dyslipidemia, obstructive sleep apnea, CKD stage IV, and a prior cerebellar stroke, was admitted with a 1-day history of right upper extremity weakness, ataxia, and difficulty ambulating. These symptoms occurred in the context of persistent severe hypertension at home, with systolic blood pressures exceeding 200 mmHg despite medication compliance.    Upon admission, a CT scan of the head showed no acute intracranial process. However, an MRI of the brain on 7/20 revealed a small acute to early subacute infarct in the left internal capsule, likely secondary to small vessel disease, with chronic ischemic changes. An MRA of the head was normal, while the MRA of the neck showed 60-70% narrowing of the right internal carotid artery. Echocardiography indicated a normal ejection fraction with mild to moderate concentric left ventricular hypertrophy and no significant valvular pathology. She was loaded with Plavix and started on a regimen of Plavix 5 mg daily, in addition to continuing aspirin 81 mg daily. Dual antiplatelet therapy is planned for at least three weeks.     The patient was initially managed with permissive hypertension, with a blood pressure goal of less than 180 mmHg during the acute phase,  transitioning to a long-term goal of less than 130/80 mmHg. Her atorvastatin dose was increased from 10 mg to 20 mg daily. A neurology follow-up is scheduled in 6-8 weeks.    Patient was admitted to acute rehab on July 23, 2025 for ongoing rehabilitation.  Internal medicine is following for medical comanagement and management of blood pressure.    # Acute to Subacute Left Internal Capsule Ischemic Stroke     The stroke is likely secondary to small vessel disease. The patient was started on dual antiplatelet therapy with Plavix and aspirin, which will continue for at least three weeks.   - Blood pressure management is crucial, with a long-term goal of less than 130/80 mmHg.   - Neurology follow-up is scheduled in 6-8 weeks.  - Therapy per PM&R team  -BP management    # Hypertension  BP labile 120s-190s/ 60s  HR 60s  Patient's PTA medications include Coreg 37.5 mg twice daily, furosemide 40 mg daily, hydralazine 25 mg 3 times daily, losartan 100 mg daily.  Prior to transfer to the hospital, per discussion with hospitalist at [facility], patient should be at her long-term goal blood pressure treatment.  -Given persistently elevated BP, we recommend the following  -Currently  Coreg at 25 mg twice daily (PTA 37.5 mg bid) HR 60s, will continue current dose for now    Resume PTA hydralazine 75 mg tid  If renal function stable, will phase in losartan      # Polycythemia     The elevated hemoglobin and hematocrit were likely due to dehydration, as they normalized with IV fluids. A JAK2 mutation test is pending, and the patient will follow up with hematology and GI for further evaluation. Next-generation sequencing results are expected in 2-3 weeks.    # Type 2 Diabetes Mellitus      The patient's blood sugar levels are elevated, with an A1c of 7.7%. Unorthodox insulin regimen PTA with only Lantus 10 units daily, high aspart mealtime dose  Has never taken metformin  Lantus has been increased to 16 units daily  Currently  "receiving aspart per sliding scale insulin and carb counting  BG remain quite elevated  Will increase carb counting and sliding scale insulin, pending diabetes consult        # Dyslipidemia     Atorvastatin was increased to 20 mg daily, with a long-term LDL goal of less than 70. The current LDL is 80.    # Obstructive Sleep Apnea      The patient is CPAP-intolerant, and alternate therapies will be considered in follow-up to address nocturnal hypoxia and reduce vascular risk.    # Chronic Kidney Disease Stage IV with Secondary Hyperparathyroidism   Monitor renal function, particularly with possibility of starting losartan        Diet: Combination Diet Regular Diet; Moderate Consistent Carb (60 g CHO per Meal) Diet; Thin Liquids (level 0)  Room Service  Snacks/Supplements Adult: Glucerna; With Meals    DVT Prophylaxis: Enoxaparin (Lovenox) SQ  Daniel Catheter: Not present  Lines: None     Cardiac Monitoring: None  Code Status: Full Code      Clinically Significant Risk Factors                 # Thrombocytopenia: Lowest platelets = 123 in last 2 days, will monitor for bleeding             # DMII: A1C = 7.7 % (Ref range: <5.7 %) within past 6 months   # Obesity: Estimated body mass index is 33.71 kg/m  as calculated from the following:    Height as of this encounter: 1.575 m (5' 2\").    Weight as of this encounter: 83.6 kg (184 lb 4.9 oz)., PRESENT ON ADMISSION     # Financial/Environmental Concerns:                      Parker Angeles MD  Hospitalist Service  [facility]  Securely message with [employer] (more info)  Text page via [employer] Paging/Directory   ______________________________________________________________________    Interval History   No new concerns  Physical Exam   Vital Signs: Temp: 97.8  F (36.6  C) Temp src: Oral BP: (!) 177/68 Pulse: 68   Resp: 16 SpO2: 97 % O2 Device: None (Room air)    Weight: 184 lbs 4.87 oz    Sleepy, easily alerts, fully " oriented  Lungs clear  CV rrr  Abd soft  No LE edema  NEURO per PMR      Data   Recent Labs   Lab 07/25/25  0204 07/24/25  2205 07/24/25  1704 07/24/25  0751 07/24/25  0606 07/23/25  1149 07/23/25  0855 07/21/25  2145 07/21/25  1914 07/21/25  1314 07/21/25  1100   WBC  --   --   --   --  6.1  --  5.6  --   --   --  6.0   HGB  --   --   --   --  14.7  --  15.5  --   --   --  15.8*   MCV  --   --   --   --  89  --  90  --   --   --  89   PLT  --   --   --   --  123*  --  128*  --   --   --  124*   NA  --   --   --   --  138  --  141  --  135  --  138   POTASSIUM  --   --   --   --  3.7  --  3.6  --  4.3  --  4.0   CHLORIDE  --   --   --   --  103  --  106  --  100  --  100   CO2  --   --   --   --  21*  --  23  --  23  --  20*   BUN  --   --   --   --  45.4*  --  42.0*  --  38.6*  --  35.1*   CR  --   --   --   --  1.89*  --  1.83*  --  1.66*  --  1.85*   ANIONGAP  --   --   --   --  14  --  12  --  12  --  18*   JURGEN  --   --   --   --  9.2  --  9.6  --  9.4  --  9.5   * 272* 234*   < > 306*   < > 209*   < > 335*   < > 396*   ALBUMIN  --   --   --   --   --   --   --   --  3.6  --   --    PROTTOTAL  --   --   --   --   --   --   --   --  7.5  --   --    BILITOTAL  --   --   --   --   --   --   --   --  0.3  --   --    ALKPHOS  --   --   --   --   --   --   --   --  138  --   --    ALT  --   --   --   --   --   --   --   --  14  --   --    AST  --   --   --   --   --   --   --   --  30  --   --    LIPASE  --   --   --   --   --   --   --   --  19  --   --     < > = values in this interval not displayed.

## 2025-07-25 NOTE — PROGRESS NOTES
"   07/24/25 0800   Appointment Info   Signing Clinician's Name / Credentials (PT) Nancy Jessica DPT   Living Environment   People in Home spouse   Current Living Arrangements house   Home Accessibility stairs to enter home   Number of Stairs, Main Entrance 4  (\"broken steps\")   Stair Railings, Main Entrance none   Number of Stairs, Within Home, Primary eight   Stair Railings, Within Home, Primary railings safe and in good condition   Transportation Anticipated family or friend will provide   Living Environment Comments Lives with spouse. 1 adult daughter (Brandi) local; another daughter (Jasmyn) in [place]. All needs able to be met on main level although pt typically uses lower level for laundry, preference for WIS in basement, and office in basement; tub/shower on main level BR. Pt lives w/ spouse; however spouse has been dealing with his own 'illnesses' per pt and daughter and not able to provide physical A. Of note, pt's  states entry stairs are in disrepair.   Self-Care   Usual Activity Tolerance good   Equipment Currently Used at Home none   Fall history within last six months no   Activity/Exercise/Self-Care Comment Reports IND in all mobility, no AD. Has a cane and a walker.   Post-Acute Assessment Only   Post-Acute Functional Assessment See below   Previous Level of Function/Home Environm   Bathing, Previous Functional Level independent   Grooming, Previous Functional Level independent   Dressing, Previous Functional Level independent   Eating/Feeding, Previous Functional Level independent   Toileting, Previous Functional Level independent   Bed Mobility, Previous Functional Level independent   Transfers, Previous Functional Level independent   Household Ambulation, Previous Functional Level independent   Stairs, Previous Functional Level independent   Community Ambulation, Previous Functional Level independent   General Information   Onset of Illness/Injury or Date of Surgery 07/19/25   Referring " "Physician Pepper Ro MD   Patient/Family Therapy Goals Statement (PT) get stronger   Pertinent History of Current Problem (include personal factors and/or comorbidities that impact the POC) Per H&P: \"Judi Moore is a 69 year old right hand dominant female with a past medical history of HTN, T2DM, HLD, CKD4, chronic lacunar infarcts of bilateral cerebellar hemispheres (on 5/2023 MRI), VANESSA, and OA s/p R TKA (2022) who was admitted on 7/19/25 with acute to subacute left internal capsule ischemic stroke with hospital course complicated by hyperglycemia, elevated BP, polycythemia, and thrombocytopenia.  She is now admitted to ARU on 07/23/25 for multidisciplinary rehabilitation and ongoing medical management.\"   Existing Precautions/Restrictions fall  (R hakeem)   General Observations Encountered pt longsitting in bed, head sidebent 25 deg. Pleasant and cooperative. Answers all questions appropriately.   Cognition   Affect/Mental Status (Cognition) WFL   Orientation Status (Cognition) oriented x 4   Follows Commands (Cognition) WFL   Cognitive Status Comments at times requires directions repeated multiple times   Pain Assessment   Patient Currently in Pain No   Integumentary/Edema   Integumentary/Edema no deficits were identifed   Range of Motion (ROM)   Range of Motion Neck (Group);ROM is WFL   Lateral Flexion, Left Neck (ROM)   Left Neck Lateral Flexion AROM - degrees 45   Lateral Flexion, Right Neck (ROM)   Right Neck Lateral Flexion PROM - degrees 15   Bed Mobility   Comment, (Bed Mobility) See GG.   Transfers   Comment, (Transfers) See GG.   Gait/Stairs (Locomotion)   Comment, (Gait/Stairs) See GG.   Balance   Balance Comments good sitting balance; 1 UE required standing   Sensory Examination   Sensory Perception Comments LT intact B; sharp/dull intact B; vibration absent R GT and returns medial mall, intact L GT; intact proprioception B GT   Coordination   Coordination Comments Slight incoordination " RUE as noted with rapid alternating pronation/supination, as well as RLE with alternating PF/DF, limited due to force production deficits.   Muscle Tone   Muscle Tone no deficits were identified   Clinical Impression   Criteria for Skilled Therapeutic Intervention Yes, treatment indicated   PT Diagnosis (PT) decreased force production   Influenced by the following impairments impaired balance, decreased strength, impaired ROM (neck), coordination deficits, impaired activity tolerance, R hemiparesis   Functional limitations due to impairments bed mobility, transfers, gait, stairs   Clinical Presentation (PT Evaluation Complexity) stable   Clinical Presentation Rationale clinical judgment   Clinical Decision Making (Complexity) low complexity   Planned Therapy Interventions (PT) balance training;bed mobility training;gait training;home exercise program;neuromuscular re-education;patient/family education;ROM (range of motion);stair training;strengthening;stretching;transfer training;E-stim   Risk & Benefits of therapy have been explained evaluation/treatment results reviewed;care plan/treatment goals reviewed;risks/benefits reviewed;current/potential barriers reviewed;participants voiced agreement with care plan;participants included;patient   Clinical Impression Comments Pt presents below functional baseline following acute to subacute L internal capsule ischemis stroke. At baseline, pt is IND with mobility. Goals for supv with LRAD,  is available but unable to physically assist. Primary impairments include impaired balance, decreased strength, impaired ROM (neck), coordination deficits, impaired activity tolerance, R hemiparesis. Pt will benefit from skilled PT to promote improved safety and independence with mobility. JOSHUAOS 1 week.   PT Total Evaluation Time   PT Eval, Low Complexity Minutes (31671) 30   Physical Therapy Goals   PT Frequency 6x/week   PT Predicted Duration/Target Date for Goal Attainment  08/01/25   PT Goals Bed Mobility;Transfers;Gait;Stairs;PT Goal 1   PT: Bed Mobility Modified independent;Bridging;Supine to/from sit   PT: Transfers Modified independent;Sit to/from stand;Bed to/from chair;Assistive device   PT: Gait Modified independent;150 feet;Assistive device   PT: Stairs 4 stairs;Supervision/stand-by assist   PT: Goal 1 car transfer supv   Interventions   Interventions Quick Adds Therapeutic Activity   Therapeutic Activity   Therapeutic Activities: dynamic activities to improve functional performance Minutes (72380) 15   Treatment Detail/Skilled Intervention Time spent acquiring equipment for OOR mobility. Education on PT POC and goals in ARU setting. Pt requesting to use bathroom before assessement, CGA with FWW bed<>FWW, CGA for toilet transfer and pt performs own pericare.   PT Discharge Planning   PT Plan STACIE Michaels uneven, PUO, sit>sup, static>dynamic balance, targeted stepping, dual task gait   Post Acute Settings Only   What unit is patient on? Acute Rehab   PT - Acute Rehab Center Time   Individual Time (minutes) - PT 45   Group Time (minutes) - PT 0   Concurrent Time (minutes) - PT 0   Co-Treatment Time (minutes) - PT 0   ARC Total Session Time (minutes) - PT 45   ARC Daily Total Session Time   PT ARC Daily Total Session Time 45   ARC Daily Rehab Total Minutes 45   Roll Left and Right: flat no rail   Assistance Needed Verbal cues   Physical Assistance Level Less than 25%   Comment Ramila for complete roll with HOB flat, no rails   Roll Left to Right CARE Score 3   Lying to Sitting on Side of Bed: flat no rail   Assistance Needed Verbal cues   Physical Assistance Level No physical assistance   Comment SBA wtih increased time and effort, no rails with HOB slightly elevated   Lying to Sitting CARE Score 4   Sit to Stand: standard height (18 inches)   Assistance Needed Verbal cues   Physical Assistance Level Contact guard assist   Comment STS CGA to FWW   Sitting to Standing CARE Score 4  "  Walk 10 Feet   Assistance Needed Adaptive equipment   Physical Assistance Level Contact guard assist   Comment 150' with FWW and CGA, slowed pace and shortened step length   Walk 10 Ft. CARE Score 4   Walk 50 Feet with Two Turns   Patient Performance Adaptive equipment   Physical Assistance Level Contact guard assist   Comment 150' with FWW and CGA, slowed pace and shortened step length   Walk 50 Ft. CARE Score 4   Walk 150 Feet   Assistance Needed Adaptive equipment   Physical Assistance Level Contact guard assist   Comment 150' with FWW and CGA, slowed pace and shortened step length   Walk 150 Ft. CARE Score 4   Wheel 50 Feet with Two Turns   Reason if not Attempted Activity not applicable   Wheel 50 Feet with Two Turns CARE Score 9   Wheel 150 Feet   Reason if not Attempted Activity not applicable   Wheel 150 Feet CARE Score 9   1 Step (Curb): 6 inches no rail   Assistance Needed Verbal cues   Physical Assistance Level Contact guard assist   Comment CGA with FWW   1 Step CARE Score 4   4 Steps: 6 inches   Assistance Needed Adaptive equipment;Verbal cues   Physical Assistance Level Less than 25%   Comment 8-6\" steps with R ascending rail, Ramila for stability   4 Steps CARE Score 3   12 Steps: 6 inches   Reason if not Attempted Activity not applicable   12 Steps CARE Score 9   Car Transfer: Ability to transfer in/out of car or van   Physical Assistance Level Contact guard assist   Comment CGA with FWW   Car Transfer CARE Score 4     "

## 2025-07-25 NOTE — PLAN OF CARE
FOCUS/GOAL  Medical management    ASSESSMENT, INTERVENTIONS AND CONTINUING PLAN FOR GOAL:  Pt is alert and oriented. No complaints of pain. Assist of 1 with walker. Continent of bladder.  this shift. Daughter spending the night. Appeared to be sleeping on rounds.

## 2025-07-25 NOTE — PROGRESS NOTES
Timed Up and Go (TUG): TUG is a test of basic mobility skills. It is used to screen individuals prone to falls.  Gait assistive device used: FWW     Patient score 36.7 seconds  >=13.5 seconds indicate at risk for falls in older adults according to Patricia et al 2000.  >=30 seconds - indicates dependency in most ADL and mobility skills according to Ray & Chas 1991  >11.5 seconds indicate at risk for falls in adults with Parkinson's Disease    Minimal Detectable Change for patients with Alzheimer s = 4.09 sec   Minimal Detectable Change for patients with Parkinson s Disease = 3.5 sec   according to Leeann & Beverley Hi 2011    Normative Data from Yo UT, Nieves D, Neli M, Diaz E, Froylan. 2017  Age                 Average Time (in seconds)  20-39                     5.9-7.4         40-59                     6.3-7.8   60-69                     7.1-9.0  70-79                     8.2-10.2  80-99                    10.0-12.7            Assessment (rationale for performing, application to patient s function & care plan):   (Minutes billed as physical performance test)

## 2025-07-25 NOTE — PROGRESS NOTES
"Discharge Planner Post-Acute Rehab PT:     Discharge Plan: home with supv, HHPT    Precautions: R hakeem, falls,     Current Status:  Bed Mobility: Ramila L/R roll, SBA sup>sit  Transfer: CGA-SBA FWW  Gait: 250' with FWW, CGA-SBNA  Stairs: 8-6\" stairs with R ascending rail, Ramila for stability  Balance: 150' with FWW and CGA, slowed pace and shortened step length     Outcome Measures:   TU/24: 36.7 sec with FWW    [place]/56 on     Assessment:  Pt demonstrates medium falls risk as evidenced by HAMPTON balance scale. Gait training focus today on increase of forward propulsion force and gait speed.     Other Barriers to Discharge (DME, Family Training, etc):   Family training:  Owns FWW and cane     "

## 2025-07-25 NOTE — PROGRESS NOTES
[facility]   Acute Rehabilitation Unit  Daily progress note    INTERVAL HISTORY  No acute events overnight.  Her SBP was elevated but she didn't have headache, flushing, or blurred vision.  It went down to 120 this morning but she didn't have dizziness or other pre-syncope.  Medicine is helping increase coreg, lasix, and hydralazine to PTA doses.  She is still having frequent urination and incontinence.  She wonders why her lantus is changing and we discussed strategies for blood sugar control.  She hasn't been eating outside food.    MEDICATIONS  Scheduled meds  Current Facility-Administered Medications   Medication Dose Route Frequency Provider Last Rate Last Admin    aspirin EC tablet 81 mg  81 mg Oral Daily Carol Hayes PA-C   81 mg at 07/24/25 0749    atorvastatin (LIPITOR) tablet 20 mg  20 mg Oral QPM Carol Hayes PA-C   20 mg at 07/24/25 2053    carvedilol (COREG) tablet 25 mg  25 mg Oral BID w/meals Carol Hayes PA-C   25 mg at 07/24/25 1804    clopidogrel (PLAVIX) tablet 75 mg  75 mg Oral Daily Carol Hayes PA-C   75 mg at 07/24/25 0749    enoxaparin ANTICOAGULANT (LOVENOX) injection 30 mg  30 mg Subcutaneous Q24H Carol Hayes PA-C   30 mg at 07/24/25 1645    furosemide (LASIX) tablet 40 mg  40 mg Oral Daily Carol Hayes PA-C   40 mg at 07/24/25 0750    hydrALAZINE (APRESOLINE) tablet 75 mg  75 mg Oral TID Parker Angeles MD        insulin aspart (NovoLOG) injection (RAPID ACTING)  1-7 Units Subcutaneous 2 times per day Clare Weston APRN CNP        insulin aspart (NovoLOG) injection (RAPID ACTING)   Subcutaneous TID w/meals Parker Angeles MD   3 Units at 07/24/25 1901    insulin aspart (NovoLOG) injection (RAPID ACTING)  1-10 Units Subcutaneous TID AC Carol Hayes PA-C   1 Units at 07/25/25 0900    insulin glargine (LANTUS PEN) injection 16 Units  16 Units Subcutaneous At Bedtime Freddy  "Carol LUNSFORD PA-C   16 Units at 07/24/25 2213    Vitamin D3 (CHOLECALCIFEROL) tablet 50 mcg  50 mcg Oral Daily Carol Hayes PA-C   50 mcg at 07/24/25 0749       PRN meds:  Current Facility-Administered Medications   Medication Dose Route Frequency Provider Last Rate Last Admin    acetaminophen (TYLENOL) tablet 650 mg  650 mg Oral Q4H PRN Carol Hayes PA-C        bisacodyl (DULCOLAX) suppository 10 mg  10 mg Rectal Daily PRN Carol Hayes PA-C        glucose gel 15-30 g  15-30 g Oral Q15 Min PRN Carol Hayes PA-C        Or    dextrose 50 % injection 25-50 mL  25-50 mL Intravenous Q15 Min PRN Carol Hayes PA-C        Or    glucagon injection 1 mg  1 mg Subcutaneous Q15 Min PRN Carol Hayes PA-C        hydrALAZINE (APRESOLINE) tablet 10 mg  10 mg Oral 4x Daily PRN Carol Hayes PA-C   10 mg at 07/23/25 2253    insulin aspart (NovoLOG) injection (RAPID ACTING)   Subcutaneous With Snacks or Supplements Clare Weston APRN CNP   3 Units at 07/23/25 2015    melatonin tablet 3 mg  3 mg Oral At Bedtime PRN Carol Hayes PA-C        polyethylene glycol (MIRALAX) Packet 17 g  17 g Oral Daily PRN Carol Hayes PA-C        senna-docusate (SENOKOT-S/PERICOLACE) 8.6-50 MG per tablet 1 tablet  1 tablet Oral BID PRN Carol Hayes PA-C           PHYSICAL EXAM  /53 (BP Location: Left arm, Patient Position: Sitting)   Pulse 74   Temp 97.8  F (36.6  C) (Oral)   Resp 17   Ht 1.575 m (5' 2\")   Wt 83.6 kg (184 lb 4.9 oz)   SpO2 100%   BMI 33.71 kg/m    Gen: no acute distress.  HEENT: no conjunctival injection, normocephalic, no rhinorrhea, neck supple.  Cardio: extremities are warm with normal skin color.   Pulm: breathing comfortably on room air.  Abd: nondistended  Ext: no edema  Neuro/MSK:  Alert, speech fluent and comprehensible, conjugate gaze, no facial asymmetry, moves upper and lower extremities volitionally against gravity and resistance. " Around 5/5 strength in upper extremities and 4/5 in lower extremities.    LABS  ROUTINE IP LABS (Last four results)  BMP  Recent Labs   Lab 07/25/25  0847 07/25/25  0204 07/24/25  2205 07/24/25  1704 07/24/25  0751 07/24/25  0606 07/23/25  1149 07/23/25  0855 07/21/25  2145 07/21/25  1914 07/21/25  1314 07/21/25  1100   NA  --   --   --   --   --  138  --  141  --  135  --  138   POTASSIUM  --   --   --   --   --  3.7  --  3.6  --  4.3  --  4.0   CHLORIDE  --   --   --   --   --  103  --  106  --  100  --  100   JURGEN  --   --   --   --   --  9.2  --  9.6  --  9.4  --  9.5   CO2  --   --   --   --   --  21*  --  23  --  23  --  20*   BUN  --   --   --   --   --  45.4*  --  42.0*  --  38.6*  --  35.1*   CR  --   --   --   --   --  1.89*  --  1.83*  --  1.66*  --  1.85*   * 170* 272* 234*   < > 306*   < > 209*   < > 335*   < > 396*    < > = values in this interval not displayed.     CBC  Recent Labs   Lab 07/24/25  0606 07/23/25  0855 07/21/25  1100 07/20/25  0802   WBC 6.1 5.6 6.0 6.0   RBC 5.01 5.15 5.32* 5.45*   HGB 14.7 15.5 15.8* 16.2*   HCT 44.4 46.4 47.5* 48.7*   MCV 89 90 89 89   MCH 29.3 30.1 29.7 29.7   MCHC 33.1 33.4 33.3 33.3   RDW 12.6 12.6 12.6 12.6   * 128* 124* 146*     ASSESSMENT AND PLAN    Judi Moore is a(n) 69 year old female with HTN, T2DM, HLD, CKD4, chronic lacunar infarcts of bilateral cerebellar hemispheres (on 5/2023 MRI), VANESSA, and OA s/p R TKA (2022) who was admitted on 7/19/25 with acute to subacute left internal capsule ischemic stroke with hospital course complicated by hyperglycemia, elevated BP, polycythemia, and thrombocytopenia.  Admitted to ARU on 07/23/25.    Acute to subacute left internal capsule ischemic stroke, etiology likely small vessel disease   History of prior cerebellar stroke (MRI 2023)  - SBP goal <130/80 long-term.  Management as below  - Continue DAPT with ASA 81 mg daily + Plavix 75 mg daily x21 days, followed by ASA 81 mg daily  - LDL goal 40-70,  management as below  - Goal A1c<7, management as below  - Continue PT, OT, SLP  - Follow up with stroke RAY in 6-8 weeks     Hypertension  PTA on Coreg 37.5 mg BID, Lasix 40 mg daily, hydralazine 75 mg TID, losartan 100 mg daily.  Elevated BP on arrival, initially allowed permissive hypertension but home meds gradually resumed.  BP elevated on ARU admission, around , but decreased to 120 related to sending team restarting home medications. Asymptomatic  - Hospitalist consulted for co-management, appreciate assistance  - BP goal <180 for acute phase; long-term goal <130/80.   - Continue Coreg 25 mg BID, increased on 7/23.  Continue to increase back to PTA dose as indicated/tolerated  - Continue PTA Lasix 40 mg daily (resumed on 7/23)  - Continue hydralazine 50 mg TID (resumed on 7/23).  Continue to increase back to PTA dose as indicated/tolerated.  - Still holding PTA Losartan, resume as indicated/tolerated  - Hydralazine 10 mg QID PRN for SBP >180  - Monitor BP, adjust regimen as indicated in partnership with medicine team     Hyperlipidemia  PTA on atorvastatin 10 mg daily, increased this admission with LDL 80  - Continue atorvastatin 20 mg daily     Type II diabetes mellitus  PTA on Novolog sliding scale insulin TID AC, carb coverage (1:13 breakfast, 1:20 lunch, 1:7 dinner), and glargine 10 units at HS  A1c 7.7%  BG quite elevated this admission, frequently into the 300s.  Seems patient has been resistant to recommendations to increase long-acting insulin dose despite requiring very high doses of short-acting insulin, poorly controlled BG, education on 50:50 basal/bolus recommendations.  Was agreeable to small increase just prior to discharge to ARU.  Endocrinology was consulted and signed off because treatment was already optimized.  - Continuing education regarding diabetes management fundamentals  - Hospitalist consulted for co-management, appreciate assistance  - Monitor BG TID AC + HS + 0200.  - Lantus  16 units HS  - Continue Novolog high intensity sliding scale insulin TID AC + HS  - Continue Novolog 1:10 g CHO TID AC  - Moderate consistent carb diet  - Hypoglycemia protocol  - Consider endocrinology consult if BG remains poorly controlled  - diabetes education 1 week prior to discharge     CKD IV  Secondary hyperparathyroidism   Follows with [facility].  Suspected 2/2 diabetic nephropathy +/- HTN nephrosclerosis.  Baseline Cr ~1.6-2.0.  During this admission, stable in 1.6-1.8 range.  7/24: Cr up slightly at 1.89, though still in chronic baseline range  - Avoid/limit nephrotoxins as able  - Repeat BMP on Monday     VANESSA  Does not tolerate CPAP  - Nocturnal hypoxia may contribute to vascular risk--consider alternate therapy in follow-up.      Polycythemia  Incidental finding.  Hgb 17.0 on admission.  Evaluated by hematology during this admission and additional labs were sent.  EPO WNL.  Iron, ferritin, sat index WNL; IBC low.  Peripheral smear unremarkable for any abnormal cells.  Hgb nearly WNL by discharge to ARU, sending team notes possible dehydration at admission contributing.  - JAK2 mutation labs pending.  Per hematology, results likely to take several weeks.  If positive, they will start on hydroxyurea for polycythemia vera  7/24: Hgb WNL 14.7  - Repeat CBC on Monday  - Follow up with hematology      Thrombocytopenia  Platelets low throughout admission, stable at 128 on 7/23 7/24: platelets stable/mildly low at 123  - Repeat CBC Monday    Alberto Gutierrez MD    I spent a total of 25 minutes on the date of service doing chart review, history and exam, documentation, and further activities as noted above.

## 2025-07-25 NOTE — CONSULTS
"Brief Diabetes Note:     Judi Moore is a 69 year old right hand dominant female with past medical history of HTN, T2DM, HLD, CKD4, chronic lacunar infarcts of bilateral cerebellar hemispheres (on 5/2023 MRI), VANESSA, and OA s/p R TKA (2022) who presented on 7/19/25 with several day history of dizziness, ataxia, right-sided weakness, and elevated BP (despite compliance with medications).  CT scan of the head did not show any acute intracranial pathology.  CTA was deferred given impaired kidney function.  She was not a candidate for tenecteplase or thrombectomy given symptoms starting multiple days prior to admission.  MRI brain showed acute to subacute left internal capsule ischemic stroke. MRA head/neck with no large vessel occlusion, 50 to 60%, right ICA stenosis.     She is admitted to  [facility] 7/23/2025 for multidisciplinary rehabilitation and ongoing medical management.     Brief review of most recent hx per MTM pharm visit from 2/28/2025:    \"Judi Moore is a very pleasant 69 y.o. female referred by Abelardo Pickard MD for a(n) follow-up consult with Medication Therapy Management (MTM) Pharmacy services. This visit was conducted In-person.  Judi's main concern today is diabetes    Diabetes- reports she got the dexcom G7 and not the reader. She has been using the  I gave her. She has this  with her today. Reports she is taking Lantus 8 units in the morning. She takes Novolog based on the following ICR. She had a couple of low sugars on the dexcom. She doesn't understand why her dexcom G7 will report a different number than her glucometer.     Breakfast: ICR 1:12  Lunch: ICR 1:20  Dinner: ICR 1:15    Medication Experience: Judi does not want to take a GLP-1 weekly injection. She prefers to lose weight by exercising on her treadmill. She does not want to take empagliflozin (Jardiance). She feels the benefits do not outweigh the risks.   Pt reports using the following medical devices: " "Glucometer Accu Check guide   Cognitive impairment: No\"    Diabetes service has been consulted for assistance, case has been reviewed with salient aspects of care, BG trend and insulin plan today.   Confirmed all insulin orders now updated and are accurate.    Case discussed with DOUGLAS Hayes, who has placed an appropriate Diab education consult.     BG has been above target, however with the increase in basal to 16 unit(s), asting at 0200 to 170 mg/dL which is quite safe and reasonable. Noted the ICR also intensified to ICR of 1:8 g cho, agree with this adjustment as well. Trend and adjust as indicated.     Lab Results   Component Value Date    A1C 7.7 07/19/2025     CBC RESULTS:   Recent Labs   Lab Test 07/24/25  0606   WBC 6.1   RBC 5.01   HGB 14.7   HCT 44.4   MCV 89   MCH 29.3   MCHC 33.1   RDW 12.6   *     Recent Labs   Lab Test 07/25/25  0847 07/25/25  0204 07/24/25  0751 07/24/25  0606 07/23/25  1149 07/23/25  0855   NA  --   --   --  138  --  141   POTASSIUM  --   --   --  3.7  --  3.6   CHLORIDE  --   --   --  103  --  106   CO2  --   --   --  21*  --  23   ANIONGAP  --   --   --  14  --  12   * 170*   < > 306*   < > 209*   BUN  --   --   --  45.4*  --  42.0*   CR  --   --   --  1.89*  --  1.83*   JURGEN  --   --   --  9.2  --  9.6    < > = values in this interval not displayed.       BG trend:             Assessment:    TIIDM c/b diab retinopathy, nephropathy, neuropathy. Fair control A1c 7.7% on 7/19/2025.  CKD [baseline creat 1.6 - 1.9]  Ischemic stroke  BMI: 34      Plan/Recommendations:           - Lantus 16 unit(s) q24 hours at 2200, titrate to achieve fasting  - 160 and no lows <90 mg/dL  - Novolog 1 unit(s) per 8 g cho, TID AC & snacks. Titrate as indicated [caution to assess based on fasting and no post-prandial reads]  - Novolog High Resistance Correction Scale (ISF: 25) TID AC > 140 mg/dL and HS and add 0200 >200 mg/dL  - BG checks: TID AC / HS / 0200  - Ok to wear CGM. Nursing " must continue to do POC BG monitoring as ordered, per hospital policy. Glucose sensor values are for patient information, but not FDA approved for treatment decisions in acute care.   - Hypoglycemia protocol  - Consider cho consistent diet as appropriate  - Diabetes education   - Consider test claim for needed supplies   - Would avoid Metformin given CKD.  - Could consider discussion re: SGLT-2 again given her CV/renal profile, do see she has declined this in the past. Would need to be in context of risk/benefit ratio.        Per ADA guidelines, treatment goals and recommendations for older adults with DM and medically complexity is less stringent BG control/A1c for safety - targets of 110-180 mg/dL and up to 250 mg/dL reasonable.  Avoid reliance on A1c.  Glucose decisions should be based on avoidance of hypoglycemia and symptomatic hyperglycemia.     Should these adjustments not be sufficient and additional assistance still needed/wanted, please do not hesitate to reach out. Signing of this note will resolve the consult requests.          Clare Weston, PASTOR CNP, BC-ADM  Inpatient Diabetes Management Service  Available on [employer]      To contact Endocrine Diabetes service:   From 7AM-5PM: page inpatient diabetes provider that is following the patient that day (see filed or incomplete progress notes/consult notes).  If uncertain of provider assignment: page job code [phone].  For questions or updates from 5PM-7AM: page the diabetes job code for on call fellow: [phone]     No charge, no face-to-face

## 2025-07-25 NOTE — DISCHARGE INSTRUCTIONS
Follow up Appointments on Discharge:     PCP in 1-2 weeks  You are scheduled to see Dr. Pickard on August 14 2025 at 12:45 pm.     Address  [address]   [address]  [facility] [address]  Phone   [phone]     Neurology with stroke RAY in 6-8 weeks  You are scheduled to see Candi Luevano NP on October 10 2025 at 2:15 pm.     Address [address] [facility] [address] [facility] [address]    [address]    [facility] [address]  Phone   [phone]     Hematology  You are scheduled to see Dr. Oleary on November 6 2025 at 2:15 pm.     Address  [address] [facility] [address]  Phone   [phone]    PM & R in 8-12 weeks  You are scheduled to see Dr. Dent on October 8 2025 at 12:15 pm.     Address  [address] [facility] [address]    [address]    [facility] [address]  Phone   [phone]     Ophthalmology  You are scheduled to see Dr. Ojeda on 8/11/25 at 10:30 am.  (Note alternative location because of construction at your usual clinic)    Address  [facility]    [address]   [address]    [facility] [address]     Phone   [phone]      Home Health Care:  [facility]  PH: [phone]  Fax:[phone]  Nurse, physical therapy and occupational therapy  -You will get a call [facility] to schedule the first home care visit. The home health nurse should come out within the first 24-48 hours. If you don't get a call from them within the first 48 hours, please call to follow up.     BP monitoring:  We recommend to check your blood pressure twice daily at home, keep a log, and bring that to your primary care follow-up appointment.  You can use the website: validatebp.org to compare home blood pressure monitoring devices.  You can also bring your home device with you to clinic to compare.    To reduce the risk of subsequent stroke there are several important factors including optimal management of anticoagulants, blood pressure, cholesterol, diabetes and smoking  "abstinence.    Anticoagulation:  You are on Aspirin and Clopidogrel through 8/10, and then aspirin alone ongoing    Blood Pressure:  Keeping your blood pressures less than 130/80 has been shown to reduce risk of recurrent stroke. Recording your blood pressure and heart rate twice daily in a log book can help you and your providers make decisions on optimal management. You are encouraged to bring your log book with you to your primary physician and/or cardiology doctor visits.    You are currently on carvedilol, furosemide, hydralazine to help control your blood pressure. Several lifestyle modifications have been associated with blood pressure reduction and are an important part of a comprehensive plan. These include: weight loss (if over-weight); a diet low in salt and cholesterol and rich in fruits and vegetables; regular aerobic physical activity and limited alcohol consumption.    Diabetes:  Optimal management of diabetes not only reduces risk of another stroke, it can help with healing process from your recent stroke. Blood glucose levels measure how well you're controlling your diabetes. An additional test \"hemoglobin A1C\" reflects how you have been overall with glucose control in the prior few months. This should be less than 7 though even lower below 6 is considered normal. Your recent Hgb A1C was 7.7%. This should be followed up with your primary provider and/or endocrinologist.    Your present plan is to check blood glucose consistently Before Meals and at Bedtime. These should be recorded along with date/time and any relevant notes such as food consumed, insulin/medication taken etc. This helps you and your providers make decisions on optimal management. You're encouraged to bring you log book with to your primary and/or endocrinology doctor visits.  Your current medications include Insulin Glargine (Lantus) and Insulin Aspart (Novolog). Specific doses and frequencies listed elsewhere.     Diet:  Diet and " "exercise are very important for diabetes regardless of being on medication or not. Be aware of and moderate your carbohydrate intake as instructed by doctor, dietitian or nurse.  Regular physical activity may be more difficult since your stroke though doing what you can on a daily basis is helpful.     Cholesterol:  Traditional target levels for LDL cholesterol or \"bad cholesterol\" is less than 130 however once you have had a stroke, your target LDL level is now less than 70. Additional recommendations such as increasing your HDL or \"good\" cholesterol and lowering your triglyceride level can also be important.    Your most recent lipid panel was   Lab Results   Component Value Date    CHOL 154 07/19/2025     Lab Results   Component Value Date    HDL 64 07/19/2025     Lab Results   Component Value Date    LDL 80 07/19/2025     Lab Results   Component Value Date    TRIG 51 07/19/2025     No results found for: \"CHOLHDLRATIO\"    This should be followed up in 2-3 months with your primary provider.    Smoking:  Finally one of the most important modifiable risk factors is to not smoke. This includes cigarettes, pipes, cigars, chewing tobacco and second hand smoke. Support through counseling, nicotine replacement, and oral smoking-cessation medications may all be helpful. Often people have been able to quit during their hospitalization but once returning to their familiar environment, the urges can be stronger. If this is the case, we encourage you to get support. There are numerous options, start by talking with your doctor.    "

## 2025-07-25 NOTE — PLAN OF CARE
Goal Outcome Evaluation:      Reviewed with: Patient    Overall Patient Progress: no change       Pt alert orientedx4, calls appropriately. Cooperates with cares and therapy. Denies pain SOB ad chest pain. On BG checks and carb coverage, insulin not given per schedule time, pt did not eat on time. Continent of both bowel and bladder last BM 7/23 per report. BP elevated, managed by scheduled medication. Safety measures in place, call light within reach. Plan of care ongoing.

## 2025-07-25 NOTE — PLAN OF CARE
Goal Outcome Evaluation:                  Pt alert and oriented x4. Denies chest pain and shortness of breath. Continent of both, LBM 7/23. A1 with walker. Pt moved to larger room with private bathroom. Family at the bedside. Able to make needs known. Call light within reach, bed in low position, bed alarm on.

## 2025-07-25 NOTE — PROGRESS NOTES
07/25/25 1000   Signing Clinician's Name / Credentials   Signing clinician's name / credentials Yumi Sawant DPT   Michaels Balance Scale (BERTA JOLLEY, DOUG S, PAPI BAUM, HARVEY MCCANN: MEASURING BALANCE IN THE ELDERLY: VALIDATION OF AN INSTRUMENT. CAN. J. PUB. HEALTH, JULY/AUGUST SUPPLEMENT 2:S7-11, 1992.)   Sit To Stand 2   Standing Unsupported 4   Sitting Unsupported 4   Stand to Sit 2   Transfers 1   Standing with Eyes Closed 3   Standing Unsupported, Feet Together 0   Reach Forward With Outstretched Arm 1   Retrieve Object From Floor 4   Turning to Look Behind 2   Turn 360 Degrees 0   Placing Alternate Foot on Stool (4-6 inches) 0   Unsupported Tandem Stand (Demonstrate to Subject) 2   One Leg Stand 0   Total Score (A score of 45 or less has been correlated with an increased risk of falls)   Total Score (out of 56) 25     Michaels Balance Scale (BBS) Cutoff Scores for CVA Population:    The BBS is a measure of static and dynamic standing balance that has been validated in community dwelling elderly individuals and individuals who have Parkinson's Disease, MS, and those who are s/p CVA and TBI. The test is administered without an assistive device. Scores from the Michaels are used to determine the probability of falling based on the patient's previous history of falls and their test performance.     0-20 High risk for falling- Corresponded with w/c bound status  21-40 Medium risk for falling- Able to walk with assistance  41-56 Low risk for falling- Able to walk independently  According to [facility].  Available at http://www.strokecenter.org/.  Accessibility verified April 10, 2013.  Minimal Detectable Change = 6.5 according to Pato & Seney 2008    Assessment (rationale for performing, application to patient s function & care plan): Pt demonstrates medium falls risk

## 2025-07-26 ENCOUNTER — APPOINTMENT (OUTPATIENT)
Dept: OCCUPATIONAL THERAPY | Facility: CLINIC | Age: 70
DRG: 057 | End: 2025-07-26
Attending: PHYSICAL MEDICINE & REHABILITATION
Payer: COMMERCIAL

## 2025-07-26 ENCOUNTER — APPOINTMENT (OUTPATIENT)
Dept: PHYSICAL THERAPY | Facility: CLINIC | Age: 70
DRG: 057 | End: 2025-07-26
Attending: PHYSICAL MEDICINE & REHABILITATION
Payer: COMMERCIAL

## 2025-07-26 LAB
GLUCOSE BLDC GLUCOMTR-MCNC: 134 MG/DL (ref 70–99)
GLUCOSE BLDC GLUCOMTR-MCNC: 174 MG/DL (ref 70–99)
GLUCOSE BLDC GLUCOMTR-MCNC: 183 MG/DL (ref 70–99)
GLUCOSE BLDC GLUCOMTR-MCNC: 186 MG/DL (ref 70–99)
GLUCOSE BLDC GLUCOMTR-MCNC: 284 MG/DL (ref 70–99)
GLUCOSE BLDC GLUCOMTR-MCNC: 291 MG/DL (ref 70–99)

## 2025-07-26 PROCEDURE — 97530 THERAPEUTIC ACTIVITIES: CPT | Mod: GP | Performed by: PHYSICAL THERAPIST

## 2025-07-26 PROCEDURE — 99232 SBSQ HOSP IP/OBS MODERATE 35: CPT | Performed by: INTERNAL MEDICINE

## 2025-07-26 PROCEDURE — 250N000011 HC RX IP 250 OP 636: Performed by: PHYSICIAN ASSISTANT

## 2025-07-26 PROCEDURE — 97129 THER IVNTJ 1ST 15 MIN: CPT | Mod: GO

## 2025-07-26 PROCEDURE — 250N000013 HC RX MED GY IP 250 OP 250 PS 637: Performed by: INTERNAL MEDICINE

## 2025-07-26 PROCEDURE — 97150 GROUP THERAPEUTIC PROCEDURES: CPT | Mod: GP | Performed by: PHYSICAL THERAPIST

## 2025-07-26 PROCEDURE — 97110 THERAPEUTIC EXERCISES: CPT | Mod: GP | Performed by: PHYSICAL THERAPIST

## 2025-07-26 PROCEDURE — 97535 SELF CARE MNGMENT TRAINING: CPT | Mod: GO

## 2025-07-26 PROCEDURE — 250N000013 HC RX MED GY IP 250 OP 250 PS 637: Performed by: PHYSICIAN ASSISTANT

## 2025-07-26 PROCEDURE — 97116 GAIT TRAINING THERAPY: CPT | Mod: GP | Performed by: PHYSICAL THERAPIST

## 2025-07-26 PROCEDURE — 128N000003 HC R&B REHAB

## 2025-07-26 PROCEDURE — 99231 SBSQ HOSP IP/OBS SF/LOW 25: CPT | Performed by: PHYSICAL MEDICINE & REHABILITATION

## 2025-07-26 RX ADMIN — HYDRALAZINE HYDROCHLORIDE 75 MG: 50 TABLET ORAL at 20:19

## 2025-07-26 RX ADMIN — CARVEDILOL 25 MG: 25 TABLET, FILM COATED ORAL at 08:03

## 2025-07-26 RX ADMIN — HYDRALAZINE HYDROCHLORIDE 75 MG: 50 TABLET ORAL at 08:04

## 2025-07-26 RX ADMIN — INSULIN ASPART 4 UNITS: 100 INJECTION, SOLUTION INTRAVENOUS; SUBCUTANEOUS at 13:10

## 2025-07-26 RX ADMIN — FUROSEMIDE 40 MG: 40 TABLET ORAL at 08:04

## 2025-07-26 RX ADMIN — ATORVASTATIN CALCIUM 20 MG: 10 TABLET, FILM COATED ORAL at 20:19

## 2025-07-26 RX ADMIN — INSULIN ASPART 4 UNITS: 100 INJECTION, SOLUTION INTRAVENOUS; SUBCUTANEOUS at 19:38

## 2025-07-26 RX ADMIN — HYDRALAZINE HYDROCHLORIDE 75 MG: 50 TABLET ORAL at 13:17

## 2025-07-26 RX ADMIN — CARVEDILOL 25 MG: 25 TABLET, FILM COATED ORAL at 18:05

## 2025-07-26 RX ADMIN — ENOXAPARIN SODIUM 30 MG: 30 INJECTION SUBCUTANEOUS at 16:43

## 2025-07-26 RX ADMIN — CLOPIDOGREL BISULFATE 75 MG: 75 TABLET, FILM COATED ORAL at 08:04

## 2025-07-26 RX ADMIN — INSULIN ASPART 4 UNITS: 100 INJECTION, SOLUTION INTRAVENOUS; SUBCUTANEOUS at 09:08

## 2025-07-26 RX ADMIN — Medication 50 MCG: at 08:04

## 2025-07-26 RX ADMIN — ASPIRIN 81 MG: 81 TABLET, DELAYED RELEASE ORAL at 08:04

## 2025-07-26 ASSESSMENT — ACTIVITIES OF DAILY LIVING (ADL)
ADLS_ACUITY_SCORE: 46
ADLS_ACUITY_SCORE: 45
ADLS_ACUITY_SCORE: 46
ADLS_ACUITY_SCORE: 45
ADLS_ACUITY_SCORE: 51

## 2025-07-26 NOTE — PLAN OF CARE
Goal Outcome Evaluation:      Plan of Care Reviewed With: patient    Overall Patient Progress: no changeOverall Patient Progress: no change     Pt is alert and oriented x 4, uses C-PAP overnight, pt's BP was monitored this shift, and meds was given. Pt is assist of 1 with walker and gait belt, on a BG checks, cab coverage and sliding scale. Pt is on a BP medication monitoring, was managed this shift. Pt denied chest pain, SOB, N/V, was able to make needs known, family by the bedside this shift. Continue pt's plan of care.

## 2025-07-26 NOTE — PROGRESS NOTES
Discharge Planner Post-Acute Rehab OT:     Discharge Plan: Mod I ADL at home with spouse, HC    Precautions: fall, HTN (SBP <180)    Current Status:  ADLs:  Mobility: SBA FWW  Grooming: SBA FWW  Dressing: UB - Min A. LB - Min-Mod A. Feet - Total A.  Bathing: Transfer CGA <> ETB with grab bar/FWW. SBA tasks.  Toileting: SBA FWW <> toilet with gb. CGA FWW cares.  IADLs: Baseline IND med mgmt and cooking; shopping on [employer]; spouse typically drives but using [employer] more recently d/t reduced mobility. Discharge A recommendations TBD.   Vision/Cognition: WFL, injections baseline for retina health. Mild memory deficit baseline per dtr. Mild R inattention, increased memory deficit noted. Recommend formal assessment and monitor of functional performance. M-ACE completed 7/26, scored 23/30.    Assessment: Completed Mini ACE, scored 23/30, WFL with deficits in memory. Demo'd reacher use, Pt declined attempting this date. Would benefit from trial with SUP to ensure appropriate strategy.    Other Barriers to Discharge (DME, Family Training, etc):   Level of physical A.    Social support: Lives with spouse (able to provide cognitive but not physical A); daughter Brandi lives locally but works/childcare and unable to provide daily assistance. Daughter Jasmyn lives in [place].    DME: Owns cane, FWW, shower chair. Recommend grab bars in either shower space.    Home environment: Able to live on main level with tub/shower BR; typically showers basement level WIS.

## 2025-07-26 NOTE — PLAN OF CARE
Goal Outcome Evaluation:      Plan of Care Reviewed With: patient    Overall Patient Progress: no change    Patient appeared sleeping on safety checks. Awake between toileting needs. Up to toilet for voiding. No pain complaints. No PRN given/requested. Moves independently in bed. No acute concerns. BG this shift was 183. Fall precautions maintained, call light in reach, safety checks completed. Ensured bed alarm is on for safety. Family present at bedside.    Continue with POC.

## 2025-07-26 NOTE — PLAN OF CARE
Individualized Overall Plan Of Care (IOPOC)    Rehab diagnosis/Impairment Group Code: Stroke ischemic 01.2 (r) body involvement (l) brain; acute to subacute left internal capsule ischemic stroke, etiology likely small vessel disease vs esus       Expected functional outcome: Supervision with stairs, bathing, tub transfers, otherwise independent with basic ADLs    Clinical Impression Comments:     Mobility: Pt presents below functional baseline following acute to subacute L internal capsule ischemis stroke. At baseline, pt is IND with mobility. Goals for supv with LRAD,  is available but unable to physically assist. Primary impairments include impaired balance, decreased strength, impaired ROM (neck), coordination deficits, impaired activity tolerance, R hemiparesis. Pt will benefit from skilled PT to promote improved safety and independence with mobility. ELOS 1 week.    ADL: Pt admitted s/p L CVA. Previously Mod I ADL and IND with med mgmt and cooking with driving A from spouse; currently requires increased A across transfers and ADL. Performance primarily limited to mild R side weakness vs inattention/motor planning/proprioception component and cognition impairing coordination, balance, and activity tolerance. Goal to progress to Mod I ADL and transfers with cognitive IADL A available from spouse but no physical A available d/t own medical needs; local adult daughter available for IADL A prn but not daily care. HC OT vs OP OT pending progress.    Communication/Cognition/Swallow:       Intensity of therapy:   PT 90 minutes, 6 times per week to 07/31/2025   OT 90 minutes, 6 times per week to 07/31/2025     Medical Prognosis/Summary: Still successful in slow improvement in blood pressure and blood sugar control.    Discharge destination: prior home and family  Discharge rehabilitation needs: PT and OT    Estimated length of stay: To 07/31/2025    Rehabilitation Physician Alberto Gutierrez MD

## 2025-07-26 NOTE — PROGRESS NOTES
"Discharge Planner Post-Acute Rehab PT:     Discharge Plan: home with supv, HHPT    Precautions: R hakeem, falls,     Current Status:  Bed Mobility: Ramila L/R roll, SBA sup>sit  Transfer: CGA-SBA FWW  Gait: 250' with FWW, CGA-SBNA  Stairs: 8-6\" stairs with R ascending rail, Ramila for stability  Balance: 150' with FWW and CGA, slowed pace and shortened step lengt  Groups: Falls Class     Outcome Measures:   TU/24: 36.7 sec with FWW    Michaels/ on     10MWT:  .43 m/s with FWW on      Assessment:  Pt attended Falls Prevention class today with group of 3 patients. Pt selected for class due to documented gait deficit and falls risk. Class includes education in falls risks, how to decrease that risk through behavior and home modifications and energy conservation; and instruction in available equipment designed to increase home safety. Pt was able to verbalize understanding of materials and participated appropriately in the discussion and problem-solving segments of the class.     Improved STS with no UE and overall upright stability.      Other Barriers to Discharge (DME, Family Training, etc):   Family training:  Owns FWW and cane     " yes

## 2025-07-26 NOTE — PROGRESS NOTES
[facility]    Medicine Progress Note - Hospitalist Service    Date of Admission:  7/23/2025    Pt was seen, course reviewed with family and team.    Events for today (7/26)  BG remain elevated  BP generally 140s-160s  HR upper 60s  Increase carvedilol 37.5 mg bid  Increase lantus from 16 to 22 units daily        Assessment & Plan     Judi Moore is a 69 year old female admitted on 7/23/2025. She A 69-year-old female with a complex medical history, including type 2 diabetes, hypertension, dyslipidemia, obstructive sleep apnea, CKD stage IV, and a prior cerebellar stroke, was admitted with a 1-day history of right upper extremity weakness, ataxia, and difficulty ambulating. These symptoms occurred in the context of persistent severe hypertension at home, with systolic blood pressures exceeding 200 mmHg despite medication compliance.    Upon admission, a CT scan of the head showed no acute intracranial process. However, an MRI of the brain on 7/20 revealed a small acute to early subacute infarct in the left internal capsule, likely secondary to small vessel disease, with chronic ischemic changes. An MRA of the head was normal, while the MRA of the neck showed 60-70% narrowing of the right internal carotid artery. Echocardiography indicated a normal ejection fraction with mild to moderate concentric left ventricular hypertrophy and no significant valvular pathology. She was loaded with Plavix and started on a regimen of Plavix 5 mg daily, in addition to continuing aspirin 81 mg daily. Dual antiplatelet therapy is planned for at least three weeks.     The patient was initially managed with permissive hypertension, with a blood pressure goal of less than 180 mmHg during the acute phase, transitioning to a long-term goal of less than 130/80 mmHg. Her atorvastatin dose was increased from 10 mg to 20 mg daily. A neurology follow-up is scheduled in 6-8 weeks.    Patient was  admitted to acute rehab on July 23, 2025 for ongoing rehabilitation.  Internal medicine is following for medical comanagement and management of blood pressure.    # Acute to Subacute Left Internal Capsule Ischemic Stroke     The stroke is likely secondary to small vessel disease. The patient was started on dual antiplatelet therapy with Plavix and aspirin, which will continue for at least three weeks.   - Blood pressure long-term goal of less than 130/80 mmHg.   - Neurology follow-up is scheduled in 6-8 weeks.  - Therapy per PM&R team  -BP management    # Hypertension  BP labile 120s-190s/ 60s  HR 60s  Patient's PTA medications include Coreg 37.5 mg twice daily, furosemide 40 mg daily, hydralazine 25 mg 3 times daily, losartan 100 mg daily.  Prior to transfer to the hospital, per discussion with hospitalist at [facility], patient should be at her long-term goal blood pressure treatment.  -Given persistently elevated BP, we recommend the following  -Currently  Coreg at 25 mg twice daily, Hydralazine recently increased to 75 mg tid  Will increase Coreg back to 37.5 mg bid        # Polycythemia     The elevated hemoglobin and hematocrit were likely due to dehydration, as they normalized with IV fluids. A JAK2 mutation test is pending, and the patient will follow up with hematology and GI for further evaluation. Next-generation sequencing results are expected in 2-3 weeks.    # Type 2 Diabetes Mellitus      The patient's blood sugar levels are elevated, with an A1c of 7.7%. Unorthodox insulin regimen PTA with only Lantus 10 units daily, high aspart mealtime dose  Has never taken metformin  Lantus has been increased to 16 units daily  Currently receiving aspart per sliding scale insulin and carb counting  BG remain elevated  Will increase Lantus to 22 units daily  Continue carb counting ( 1 unit per 8 gram) + sliding scale insulin coverage--may need more intense coverage  At some point, metformin may be  beneficial--discussed with Pt on 726; she prefers to not start metformin, but will review with primary provider after discharge          # Dyslipidemia     Atorvastatin was increased to 20 mg daily, with a long-term LDL goal of less than 70. The current LDL is 80.    # Obstructive Sleep Apnea      The patient is CPAP-intolerant, and alternate therapies will be considered in follow-up to address nocturnal hypoxia and reduce vascular risk.    # Chronic Kidney Disease Stage IV with Secondary Hyperparathyroidism   Monitor renal function, particularly with possibility of starting losartan        Diet: Combination Diet Regular Diet; Moderate Consistent Carb (60 g CHO per Meal) Diet; Thin Liquids (level 0)  Room Service  Snacks/Supplements Adult: Glucerna; With Meals    DVT Prophylaxis: Enoxaparin (Lovenox) SQ  Daniel Catheter: Not present  Lines: None     Cardiac Monitoring: None  Code Status: Full Code                   Parker Angeles MD  Hospitalist Service  [facility]  Securely message with [employer] (more info)  Text page via [employer] Paging/Directory   ______________________________________________________________________    Interval History   No new concerns  Physical Exam   Vital Signs: Temp: 97.9  F (36.6  C) Temp src: Oral BP: (!) 153/47 Pulse: 69   Resp: 16 SpO2: 94 % O2 Device: None (Room air)    Weight: 184 lbs 4.87 oz  Alert, fully oriented  Lungs clear  CV rrr  Abd soft  NEURO per PMR      Data   Recent Labs   Lab 07/26/25  0802 07/26/25  0209 07/25/25  2100 07/24/25  0751 07/24/25  0606 07/23/25  1149 07/23/25  0855 07/21/25  2145 07/21/25  1914 07/21/25  1314 07/21/25  1100   WBC  --   --   --   --  6.1  --  5.6  --   --   --  6.0   HGB  --   --   --   --  14.7  --  15.5  --   --   --  15.8*   MCV  --   --   --   --  89  --  90  --   --   --  89   PLT  --   --   --   --  123*  --  128*  --   --   --  124*   NA  --   --   --   --  138  --  141  --  135  --  138    POTASSIUM  --   --   --   --  3.7  --  3.6  --  4.3  --  4.0   CHLORIDE  --   --   --   --  103  --  106  --  100  --  100   CO2  --   --   --   --  21*  --  23  --  23  --  20*   BUN  --   --   --   --  45.4*  --  42.0*  --  38.6*  --  35.1*   CR  --   --   --   --  1.89*  --  1.83*  --  1.66*  --  1.85*   ANIONGAP  --   --   --   --  14  --  12  --  12  --  18*   JURGEN  --   --   --   --  9.2  --  9.6  --  9.4  --  9.5   * 183* 285*   < > 306*   < > 209*   < > 335*   < > 396*   ALBUMIN  --   --   --   --   --   --   --   --  3.6  --   --    PROTTOTAL  --   --   --   --   --   --   --   --  7.5  --   --    BILITOTAL  --   --   --   --   --   --   --   --  0.3  --   --    ALKPHOS  --   --   --   --   --   --   --   --  138  --   --    ALT  --   --   --   --   --   --   --   --  14  --   --    AST  --   --   --   --   --   --   --   --  30  --   --    LIPASE  --   --   --   --   --   --   --   --  19  --   --     < > = values in this interval not displayed.

## 2025-07-26 NOTE — PLAN OF CARE
Goal Outcome Evaluation:      Plan of Care Reviewed With: patient    Overall Patient Progress: improvingOverall Patient Progress: improving  Patient has participated in therapy. Tolerating well.  BP was improved this morning however elevated this afternoon. Afternoon hydralazine will be given slightly early within her allotted time frame.   BG continues to be elevated. Lantus dose increased for bedtime. Sliding scale and carb counts continue. Patient has questions about diabetes and how to get her sugars to decrease. She has a diabetes educator consult placed.  Ambulated to toilet with CGA and walker.   Continent of bowel and bladder.  LBM 7/26.

## 2025-07-26 NOTE — PROGRESS NOTES
[facility]   Acute Rehabilitation Unit  Daily progress note    INTERVAL HISTORY  Judi Moore was seen and examined in her room. She was doing ok. Was concerned about her BG and BP numbers. Noted that she was using Descom at some point but it didn't seem to be accurate; readings were very different from her glucometer so she stopped using that.     Reviewed the plan for her HTN and DM management as the most important stroke risk factors. Appreciate hospitalist team's recs and co-management.     MEDICATIONS  Scheduled meds  Current Facility-Administered Medications   Medication Dose Route Frequency Provider Last Rate Last Admin    aspirin EC tablet 81 mg  81 mg Oral Daily Carol Hayes PA-C   81 mg at 07/26/25 0804    atorvastatin (LIPITOR) tablet 20 mg  20 mg Oral QPM Carol Hayes PA-C   20 mg at 07/25/25 2130    carvedilol (COREG) tablet 25 mg  25 mg Oral BID w/meals Carol Hayes PA-C   25 mg at 07/26/25 0803    clopidogrel (PLAVIX) tablet 75 mg  75 mg Oral Daily Carol Hayes PA-C   75 mg at 07/26/25 0804    enoxaparin ANTICOAGULANT (LOVENOX) injection 30 mg  30 mg Subcutaneous Q24H Carol Hayes PA-C   30 mg at 07/25/25 1615    furosemide (LASIX) tablet 40 mg  40 mg Oral Daily Carol Hayes PA-C   40 mg at 07/26/25 0804    hydrALAZINE (APRESOLINE) tablet 75 mg  75 mg Oral TID aPrker Angeles MD   75 mg at 07/26/25 1317    insulin aspart (NovoLOG) injection (RAPID ACTING)  1-7 Units Subcutaneous 2 times per day Clare Weston APRN CNP   4 Units at 07/25/25 2132    insulin aspart (NovoLOG) injection (RAPID ACTING)   Subcutaneous TID w/meals Parker Angeles MD   4 Units at 07/26/25 1310    insulin aspart (NovoLOG) injection (RAPID ACTING)  1-10 Units Subcutaneous TID AC Carol Hayes PA-C   7 Units at 07/26/25 1310    insulin glargine (LANTUS PEN) injection 22 Units  22 Units Subcutaneous At Bedtime  "Parker Angeles MD        Vitamin D3 (CHOLECALCIFEROL) tablet 50 mcg  50 mcg Oral Daily Carol Hayes PA-C   50 mcg at 07/26/25 0804       PRN meds:  Current Facility-Administered Medications   Medication Dose Route Frequency Provider Last Rate Last Admin    acetaminophen (TYLENOL) tablet 650 mg  650 mg Oral Q4H PRN Carol Hayes PA-C        bisacodyl (DULCOLAX) suppository 10 mg  10 mg Rectal Daily PRN Carol Hayes PA-C        glucose gel 15-30 g  15-30 g Oral Q15 Min PRN Carol Hayes PA-C        Or    dextrose 50 % injection 25-50 mL  25-50 mL Intravenous Q15 Min PRN Carol Hayes PA-C        Or    glucagon injection 1 mg  1 mg Subcutaneous Q15 Min PRN Carol Hayes PA-C        hydrALAZINE (APRESOLINE) tablet 10 mg  10 mg Oral 4x Daily PRN Carol Hayes PA-C   10 mg at 07/23/25 2253    insulin aspart (NovoLOG) injection (RAPID ACTING)   Subcutaneous With Snacks or Supplements Clare Weston APRN CNP   3 Units at 07/23/25 2015    melatonin tablet 3 mg  3 mg Oral At Bedtime PRN Carol Hayes PA-C        polyethylene glycol (MIRALAX) Packet 17 g  17 g Oral Daily PRN Carol Hayes PA-C        senna-docusate (SENOKOT-S/PERICOLACE) 8.6-50 MG per tablet 1 tablet  1 tablet Oral BID PRN Carol Hayes PA-C           PHYSICAL EXAM  BP (!) 154/54 (BP Location: Left arm, Patient Position: Chair)   Pulse 67   Temp 97.9  F (36.6  C) (Oral)   Resp 16   Ht 1.575 m (5' 2\")   Wt 83.6 kg (184 lb 4.9 oz)   SpO2 94%   BMI 33.71 kg/m      Gen: no acute distress, sitting in chair   HEENT: MMM  Cardio: regular pulse   Pulm: non-labored in room air   Abd: soft and non-distended  Ext: trace edema at ankles; no tenderness at calves   Neuro/MSK: alert and oriented, speech was clear an coherent, was noted ambulating with FWW in the hallways     LABS  ROUTINE IP LABS (Last four results)  BMP  Recent Labs   Lab 07/26/25  1215 07/26/25  0802 07/26/25  0209 " 07/25/25  2100 07/24/25  0751 07/24/25  0606 07/23/25  1149 07/23/25  0855 07/21/25  2145 07/21/25  1914 07/21/25  1314 07/21/25  1100   NA  --   --   --   --   --  138  --  141  --  135  --  138   POTASSIUM  --   --   --   --   --  3.7  --  3.6  --  4.3  --  4.0   CHLORIDE  --   --   --   --   --  103  --  106  --  100  --  100   JURGEN  --   --   --   --   --  9.2  --  9.6  --  9.4  --  9.5   CO2  --   --   --   --   --  21*  --  23  --  23  --  20*   BUN  --   --   --   --   --  45.4*  --  42.0*  --  38.6*  --  35.1*   CR  --   --   --   --   --  1.89*  --  1.83*  --  1.66*  --  1.85*   * 174* 183* 285*   < > 306*   < > 209*   < > 335*   < > 396*    < > = values in this interval not displayed.     CBC  Recent Labs   Lab 07/24/25  0606 07/23/25  0855 07/21/25  1100 07/20/25  0802   WBC 6.1 5.6 6.0 6.0   RBC 5.01 5.15 5.32* 5.45*   HGB 14.7 15.5 15.8* 16.2*   HCT 44.4 46.4 47.5* 48.7*   MCV 89 90 89 89   MCH 29.3 30.1 29.7 29.7   MCHC 33.1 33.4 33.3 33.3   RDW 12.6 12.6 12.6 12.6   * 128* 124* 146*     ASSESSMENT AND PLAN  Judi Moore is a(n) 69 year old female with HTN, T2DM, HLD, CKD4, chronic lacunar infarcts of bilateral cerebellar hemispheres (on 5/2023 MRI), VANESSA, and OA s/p R TKA (2022) who was admitted on 7/19/25 with acute to subacute left internal capsule ischemic stroke with hospital course complicated by hyperglycemia, elevated BP, polycythemia, and thrombocytopenia.  Admitted to ARU on 07/23/25.    --Vitals stable.   --Labs: none today.    --Continue ongoing medical management.   -coreg dose was increased for better control of HTN   -lantus dose was alsp increased for better control of DM II    --Continue therapies and plan of care. Making good gains; currently requires CGA to min A for ADLs and SBA for ambulation with FWW. Anticipate a few more days at ARU to get to mod I level before discharge.       Moriah Hollins MD  Physical Medicine & Rehabilitation

## 2025-07-27 ENCOUNTER — APPOINTMENT (OUTPATIENT)
Dept: OCCUPATIONAL THERAPY | Facility: CLINIC | Age: 70
DRG: 057 | End: 2025-07-27
Attending: PHYSICAL MEDICINE & REHABILITATION
Payer: COMMERCIAL

## 2025-07-27 ENCOUNTER — APPOINTMENT (OUTPATIENT)
Dept: PHYSICAL THERAPY | Facility: CLINIC | Age: 70
DRG: 057 | End: 2025-07-27
Attending: PHYSICAL MEDICINE & REHABILITATION
Payer: COMMERCIAL

## 2025-07-27 LAB
GLUCOSE BLDC GLUCOMTR-MCNC: 174 MG/DL (ref 70–99)
GLUCOSE BLDC GLUCOMTR-MCNC: 205 MG/DL (ref 70–99)
GLUCOSE BLDC GLUCOMTR-MCNC: 215 MG/DL (ref 70–99)
GLUCOSE BLDC GLUCOMTR-MCNC: 261 MG/DL (ref 70–99)
GLUCOSE BLDC GLUCOMTR-MCNC: 295 MG/DL (ref 70–99)

## 2025-07-27 PROCEDURE — 97110 THERAPEUTIC EXERCISES: CPT | Mod: GP

## 2025-07-27 PROCEDURE — 250N000013 HC RX MED GY IP 250 OP 250 PS 637: Performed by: INTERNAL MEDICINE

## 2025-07-27 PROCEDURE — 97112 NEUROMUSCULAR REEDUCATION: CPT | Mod: GP

## 2025-07-27 PROCEDURE — 97535 SELF CARE MNGMENT TRAINING: CPT | Mod: GO

## 2025-07-27 PROCEDURE — 97150 GROUP THERAPEUTIC PROCEDURES: CPT | Mod: GO

## 2025-07-27 PROCEDURE — 128N000003 HC R&B REHAB

## 2025-07-27 PROCEDURE — 99232 SBSQ HOSP IP/OBS MODERATE 35: CPT | Performed by: INTERNAL MEDICINE

## 2025-07-27 PROCEDURE — 250N000011 HC RX IP 250 OP 636: Performed by: PHYSICIAN ASSISTANT

## 2025-07-27 PROCEDURE — 250N000013 HC RX MED GY IP 250 OP 250 PS 637: Performed by: PHYSICIAN ASSISTANT

## 2025-07-27 RX ADMIN — Medication 50 MCG: at 07:50

## 2025-07-27 RX ADMIN — CARVEDILOL 37.5 MG: 25 TABLET, FILM COATED ORAL at 19:03

## 2025-07-27 RX ADMIN — ENOXAPARIN SODIUM 30 MG: 30 INJECTION SUBCUTANEOUS at 16:31

## 2025-07-27 RX ADMIN — CARVEDILOL 37.5 MG: 25 TABLET, FILM COATED ORAL at 08:15

## 2025-07-27 RX ADMIN — HYDRALAZINE HYDROCHLORIDE 75 MG: 50 TABLET ORAL at 07:49

## 2025-07-27 RX ADMIN — FUROSEMIDE 40 MG: 40 TABLET ORAL at 07:49

## 2025-07-27 RX ADMIN — INSULIN ASPART 4 UNITS: 100 INJECTION, SOLUTION INTRAVENOUS; SUBCUTANEOUS at 12:48

## 2025-07-27 RX ADMIN — INSULIN ASPART 3 UNITS: 100 INJECTION, SOLUTION INTRAVENOUS; SUBCUTANEOUS at 19:11

## 2025-07-27 RX ADMIN — INSULIN ASPART 4 UNITS: 100 INJECTION, SOLUTION INTRAVENOUS; SUBCUTANEOUS at 08:40

## 2025-07-27 RX ADMIN — ASPIRIN 81 MG: 81 TABLET, DELAYED RELEASE ORAL at 07:50

## 2025-07-27 RX ADMIN — ATORVASTATIN CALCIUM 20 MG: 10 TABLET, FILM COATED ORAL at 20:13

## 2025-07-27 RX ADMIN — CLOPIDOGREL BISULFATE 75 MG: 75 TABLET, FILM COATED ORAL at 07:50

## 2025-07-27 RX ADMIN — HYDRALAZINE HYDROCHLORIDE 75 MG: 50 TABLET ORAL at 20:12

## 2025-07-27 RX ADMIN — HYDRALAZINE HYDROCHLORIDE 75 MG: 50 TABLET ORAL at 13:56

## 2025-07-27 ASSESSMENT — ACTIVITIES OF DAILY LIVING (ADL)
ADLS_ACUITY_SCORE: 42
ADLS_ACUITY_SCORE: 41
ADLS_ACUITY_SCORE: 42
ADLS_ACUITY_SCORE: 41
ADLS_ACUITY_SCORE: 42

## 2025-07-27 NOTE — PLAN OF CARE
[id number]    Goal Outcome Evaluation:    FOCUS/GOAL  Medication management    ASSESSMENT, INTERVENTIONS AND CONTINUING PLAN FOR GOAL:  Alert and orientedx4, CGA walker. Makes needs known, alarms on.  Pt denied pain during late evening.  Incontinent of bowel and bladder. LBM 7/26.  No acute changes, continue with POC.

## 2025-07-27 NOTE — PLAN OF CARE
Goal Outcome Evaluation:      Plan of Care Reviewed With: patient    Overall Patient Progress: improvingOverall Patient Progress: improving  Patient continues to participate in therapy.    BP elevated this morning however decreased after am medications. One BP med dose was increased today and the full dose was given.  Denies pain.  BG's elevated. Lantus dose increased for tonight.  Continent of bowel and bladder on toilet. LBM today.

## 2025-07-27 NOTE — PROGRESS NOTES
[facility]    Medicine Progress Note - Hospitalist Service    Date of Admission:  7/23/2025    Pt was seen, course reviewed with family and team.    Events for today (7/27)  BG 100s-200s; fasting   BP generally 140s-170s  HR upper 60s  carvedilol to be increased to 37.5 mg bid  Increase lantus from 22 to 28 units daily        Assessment & Plan     Judi Moore is a 69 year old female admitted on 7/23/2025. She A 69-year-old female with a complex medical history, including type 2 diabetes, hypertension, dyslipidemia, obstructive sleep apnea, CKD stage IV, and a prior cerebellar stroke, was admitted with a 1-day history of right upper extremity weakness, ataxia, and difficulty ambulating. These symptoms occurred in the context of persistent severe hypertension at home, with systolic blood pressures exceeding 200 mmHg despite medication compliance.    Upon admission, a CT scan of the head showed no acute intracranial process. However, an MRI of the brain on 7/20 revealed a small acute to early subacute infarct in the left internal capsule, likely secondary to small vessel disease, with chronic ischemic changes. An MRA of the head was normal, while the MRA of the neck showed 60-70% narrowing of the right internal carotid artery. Echocardiography indicated a normal ejection fraction with mild to moderate concentric left ventricular hypertrophy and no significant valvular pathology. She was loaded with Plavix and started on a regimen of Plavix 5 mg daily, in addition to continuing aspirin 81 mg daily. Dual antiplatelet therapy is planned for at least three weeks.     The patient was initially managed with permissive hypertension, with a blood pressure goal of less than 180 mmHg during the acute phase, transitioning to a long-term goal of less than 130/80 mmHg. Her atorvastatin dose was increased from 10 mg to 20 mg daily. A neurology follow-up is scheduled in 6-8  weeks.    Patient was admitted to acute rehab on July 23, 2025 for ongoing rehabilitation.  Internal medicine is following for medical comanagement and management of blood pressure.    # Acute to Subacute Left Internal Capsule Ischemic Stroke     The stroke is likely secondary to small vessel disease. The patient was started on dual antiplatelet therapy with Plavix and aspirin, which will continue for at least three weeks.   - Blood pressure long-term goal of less than 130/80 mmHg.   - Neurology follow-up is scheduled in 6-8 weeks.  - Therapy per PM&R team  -BP management    # Hypertension  BP labile 120s-190s/ 60s  HR 60s  Patient's PTA medications include Coreg 37.5 mg twice daily, furosemide 40 mg daily, hydralazine 25 mg 3 times daily, losartan 100 mg daily.  Prior to transfer to the hospital, per discussion with hospitalist at [facility], patient should be at her long-term goal blood pressure treatment.  Carvedilol 37.5 mg bid  Hydralazine 75 mg bid  Furosemide 40 mg daily  PTA was receiving losartan, has not yet been resumed  --consider resumption of low dose losartan in am if BMP stable        # Polycythemia     The elevated hemoglobin and hematocrit were likely due to dehydration, as they normalized with IV fluids. A JAK2 mutation test is pending, and the patient will follow up with hematology and GI for further evaluation. Next-generation sequencing results are expected in 2-3 weeks.    # Type 2 Diabetes Mellitus      The patient's blood sugar levels are elevated, with an A1c of 7.7%. Unorthodox insulin regimen PTA with only Lantus 10 units daily, high aspart mealtime dose  Has never taken metformin  Lantus has been increased to 22 units daily  Currently receiving aspart per sliding scale insulin and carb counting  BG remain elevated  Will increase Lantus to 28 units daily  Continue carb counting ( 1 unit per 8 gram) + sliding scale insulin coverage--may need more intense coverage  Patient does not wish to  start metformin.   notes some uncertainty regarding carb counting; it ultimately be preferable to start fixed dose insulin in addition to sliding scale with meals        # Dyslipidemia     Atorvastatin was increased to 20 mg daily, with a long-term LDL goal of less than 70. The current LDL is 80.    # Obstructive Sleep Apnea      The patient is CPAP-intolerant, and alternate therapies will be considered in follow-up to address nocturnal hypoxia and reduce vascular risk.    # Chronic Kidney Disease Stage IV with Secondary Hyperparathyroidism   Monitor renal function, particularly with possibility of starting losartan        Diet: Combination Diet Regular Diet; Moderate Consistent Carb (60 g CHO per Meal) Diet; Thin Liquids (level 0)  Room Service  Snacks/Supplements Adult: Glucerna; With Meals    DVT Prophylaxis: Enoxaparin (Lovenox) SQ  Daniel Catheter: Not present  Lines: None     Cardiac Monitoring: None  Code Status: Full Code                   Parker Angeles MD  Hospitalist Service  [facility]  Securely message with [employer] (more info)  Text page via [employer] Paging/Directory   ______________________________________________________________________    Interval History   No new concerns  No complaints of chest pain, shortness of breath nausea, vomiting.  Appetite fair    Physical Exam   Vital Signs:     BP: (!) 170/57 Pulse: 72   Resp: 18 SpO2: 95 % O2 Device: None (Room air)    Weight: 184 lbs 4.87 oz  Alert, fully oriented  Lungs clear  CV rrr  Abd soft  NEURO per PMR      Data   Recent Labs   Lab 07/27/25  0743 07/27/25  0209 07/26/25  2200 07/24/25  0751 07/24/25  0606 07/23/25  1149 07/23/25  0855 07/21/25  2145 07/21/25  1914 07/21/25  1314 07/21/25  1100   WBC  --   --   --   --  6.1  --  5.6  --   --   --  6.0   HGB  --   --   --   --  14.7  --  15.5  --   --   --  15.8*   MCV  --   --   --   --  89  --  90  --   --   --  89   PLT  --   --   --   --   123*  --  128*  --   --   --  124*   NA  --   --   --   --  138  --  141  --  135  --  138   POTASSIUM  --   --   --   --  3.7  --  3.6  --  4.3  --  4.0   CHLORIDE  --   --   --   --  103  --  106  --  100  --  100   CO2  --   --   --   --  21*  --  23  --  23  --  20*   BUN  --   --   --   --  45.4*  --  42.0*  --  38.6*  --  35.1*   CR  --   --   --   --  1.89*  --  1.83*  --  1.66*  --  1.85*   ANIONGAP  --   --   --   --  14  --  12  --  12  --  18*   JURGEN  --   --   --   --  9.2  --  9.6  --  9.4  --  9.5   * 215* 284*   < > 306*   < > 209*   < > 335*   < > 396*   ALBUMIN  --   --   --   --   --   --   --   --  3.6  --   --    PROTTOTAL  --   --   --   --   --   --   --   --  7.5  --   --    BILITOTAL  --   --   --   --   --   --   --   --  0.3  --   --    ALKPHOS  --   --   --   --   --   --   --   --  138  --   --    ALT  --   --   --   --   --   --   --   --  14  --   --    AST  --   --   --   --   --   --   --   --  30  --   --    LIPASE  --   --   --   --   --   --   --   --  19  --   --     < > = values in this interval not displayed.

## 2025-07-27 NOTE — PLAN OF CARE
"  VS: BP (!) 170/67 (BP Location: Left arm, Patient Position: Supine, Cuff Size: Adult Large)   Pulse 74   Temp 97.9  F (36.6  C) (Oral)   Resp 18   Ht 1.575 m (5' 2\")   Wt 83.6 kg (184 lb 4.9 oz)   SpO2 95%   BMI 33.71 kg/m       O2: On RA   Output: Using toilet without difficulty    Last BM: 7/26   Activity: A1 walker, gait belt   Up for meals? Yes   Skin: Bruise noted to ABD, most likely from lovenox shots   Pain: Denies   CMS: A&O x 4    Dressing: N/A   Diet: Mod CCHO,  Thin, meds whole   LDA: N/A   Equipment: Walker   Plan: Continue plan of care.    Additional Info:    Goal Outcome Evaluation:                            "

## 2025-07-27 NOTE — PLAN OF CARE
"Goal Outcome Evaluation:       OT: Pt participated in the established \"Transitions Group\" for stroke pts. Highlighting topics such as return to meaningful activities, rehab course, neuroplasticity, follow up therapy, fall prevention, health/wellness, fatigue, depression/anxiety, bowel/bladder considerations w/ community re-integration and caregiver wellness/support. Pt was very receptive to class. Pt reports personal goals after discharge are to improve her independence with self care and cooking.                       "

## 2025-07-27 NOTE — PROGRESS NOTES
Stroke Education Note  The following information was reviewed with the patient:  Warning signs and symptoms of stroke, including BE FAST  Calling 911 if having warning signs/symptoms of stroke  All modifiable risk factors: hypertension, CAD, atrial fibrillation, diabetes, hypercholesterolemia, smoking, substance abuse, diet, physical inactivity, obesity, and sleep apnea.  The patient's risk factors including: HTN, HLD, diabetes, overweight  Follow up plan after discharge  Medications related to stroke    Learners response: Engaged in education and taking steps    The following education materials were reviewed with the patient in depth and a copy was given to refer to: Learning about BE FAST, Learning about Risk Factors for Stroke.  Understanding Stroke-Key Resources After a Stroke and Preventing Another Stroke-Controlling Your Risk Factors was also given to them to review.  Encouraged the patient to share the information with her family.

## 2025-07-27 NOTE — PLAN OF CARE
Goal Outcome Evaluation:      Plan of Care Reviewed With: patient    Overall Patient Progress: no change    Patient is alert and oriented. Requested to use the bathroom beginning of NOC shift. Steadying gait. No pain or discomfort reported. No PRN given requested. BG this shift was 215. Fall precautions maintained, call light in reach, safety checks completed. Ensured bed alarm is on for safety. Family present at bedside.     Continue with POC.

## 2025-07-28 ENCOUNTER — APPOINTMENT (OUTPATIENT)
Dept: OCCUPATIONAL THERAPY | Facility: CLINIC | Age: 70
DRG: 057 | End: 2025-07-28
Attending: PHYSICAL MEDICINE & REHABILITATION
Payer: COMMERCIAL

## 2025-07-28 ENCOUNTER — APPOINTMENT (OUTPATIENT)
Dept: PHYSICAL THERAPY | Facility: CLINIC | Age: 70
DRG: 057 | End: 2025-07-28
Attending: PHYSICAL MEDICINE & REHABILITATION
Payer: COMMERCIAL

## 2025-07-28 LAB
ANION GAP SERPL CALCULATED.3IONS-SCNC: 15 MMOL/L (ref 7–15)
BUN SERPL-MCNC: 49.1 MG/DL (ref 8–23)
CALCIUM SERPL-MCNC: 9.6 MG/DL (ref 8.8–10.4)
CHLORIDE SERPL-SCNC: 105 MMOL/L (ref 98–107)
CREAT SERPL-MCNC: 1.88 MG/DL (ref 0.51–0.95)
EGFRCR SERPLBLD CKD-EPI 2021: 28 ML/MIN/1.73M2
ERYTHROCYTE [DISTWIDTH] IN BLOOD BY AUTOMATED COUNT: 12.8 % (ref 10–15)
GLUCOSE BLDC GLUCOMTR-MCNC: 126 MG/DL (ref 70–99)
GLUCOSE BLDC GLUCOMTR-MCNC: 136 MG/DL (ref 70–99)
GLUCOSE BLDC GLUCOMTR-MCNC: 151 MG/DL (ref 70–99)
GLUCOSE BLDC GLUCOMTR-MCNC: 88 MG/DL (ref 70–99)
GLUCOSE BLDC GLUCOMTR-MCNC: 92 MG/DL (ref 70–99)
GLUCOSE SERPL-MCNC: 127 MG/DL (ref 70–99)
HCO3 SERPL-SCNC: 22 MMOL/L (ref 22–29)
HCT VFR BLD AUTO: 46.9 % (ref 35–47)
HGB BLD-MCNC: 15.4 G/DL (ref 11.7–15.7)
MCH RBC QN AUTO: 29.5 PG (ref 26.5–33)
MCHC RBC AUTO-ENTMCNC: 32.8 G/DL (ref 31.5–36.5)
MCV RBC AUTO: 90 FL (ref 78–100)
PLATELET # BLD AUTO: 146 10E3/UL (ref 150–450)
POTASSIUM SERPL-SCNC: 4.1 MMOL/L (ref 3.4–5.3)
RBC # BLD AUTO: 5.22 10E6/UL (ref 3.8–5.2)
SODIUM SERPL-SCNC: 142 MMOL/L (ref 135–145)
WBC # BLD AUTO: 6.2 10E3/UL (ref 4–11)

## 2025-07-28 PROCEDURE — 85014 HEMATOCRIT: CPT | Performed by: PHYSICIAN ASSISTANT

## 2025-07-28 PROCEDURE — 250N000013 HC RX MED GY IP 250 OP 250 PS 637: Performed by: INTERNAL MEDICINE

## 2025-07-28 PROCEDURE — 99232 SBSQ HOSP IP/OBS MODERATE 35: CPT | Performed by: INTERNAL MEDICINE

## 2025-07-28 PROCEDURE — 97110 THERAPEUTIC EXERCISES: CPT | Mod: GP | Performed by: REHABILITATION PRACTITIONER

## 2025-07-28 PROCEDURE — 128N000003 HC R&B REHAB

## 2025-07-28 PROCEDURE — 97530 THERAPEUTIC ACTIVITIES: CPT | Mod: GP

## 2025-07-28 PROCEDURE — 36415 COLL VENOUS BLD VENIPUNCTURE: CPT | Performed by: PHYSICIAN ASSISTANT

## 2025-07-28 PROCEDURE — 250N000011 HC RX IP 250 OP 636: Performed by: PHYSICIAN ASSISTANT

## 2025-07-28 PROCEDURE — 97535 SELF CARE MNGMENT TRAINING: CPT | Mod: GO | Performed by: OCCUPATIONAL THERAPIST

## 2025-07-28 PROCEDURE — 250N000013 HC RX MED GY IP 250 OP 250 PS 637: Performed by: PHYSICIAN ASSISTANT

## 2025-07-28 PROCEDURE — 99232 SBSQ HOSP IP/OBS MODERATE 35: CPT | Performed by: PHYSICIAN ASSISTANT

## 2025-07-28 PROCEDURE — 80048 BASIC METABOLIC PNL TOTAL CA: CPT | Performed by: PHYSICIAN ASSISTANT

## 2025-07-28 PROCEDURE — 97112 NEUROMUSCULAR REEDUCATION: CPT | Mod: GP

## 2025-07-28 PROCEDURE — 97116 GAIT TRAINING THERAPY: CPT | Mod: GP | Performed by: REHABILITATION PRACTITIONER

## 2025-07-28 RX ORDER — LOSARTAN POTASSIUM 25 MG/1
25 TABLET ORAL DAILY
Status: DISCONTINUED | OUTPATIENT
Start: 2025-07-28 | End: 2025-07-31 | Stop reason: HOSPADM

## 2025-07-28 RX ADMIN — HYDRALAZINE HYDROCHLORIDE 75 MG: 50 TABLET ORAL at 08:12

## 2025-07-28 RX ADMIN — ATORVASTATIN CALCIUM 20 MG: 10 TABLET, FILM COATED ORAL at 20:45

## 2025-07-28 RX ADMIN — HYDRALAZINE HYDROCHLORIDE 75 MG: 50 TABLET ORAL at 20:45

## 2025-07-28 RX ADMIN — CARVEDILOL 37.5 MG: 25 TABLET, FILM COATED ORAL at 08:13

## 2025-07-28 RX ADMIN — INSULIN ASPART 10 UNITS: 100 INJECTION, SOLUTION INTRAVENOUS; SUBCUTANEOUS at 08:18

## 2025-07-28 RX ADMIN — FUROSEMIDE 40 MG: 40 TABLET ORAL at 08:13

## 2025-07-28 RX ADMIN — Medication 50 MCG: at 08:12

## 2025-07-28 RX ADMIN — CARVEDILOL 37.5 MG: 25 TABLET, FILM COATED ORAL at 18:58

## 2025-07-28 RX ADMIN — ASPIRIN 81 MG: 81 TABLET, DELAYED RELEASE ORAL at 08:13

## 2025-07-28 RX ADMIN — CLOPIDOGREL BISULFATE 75 MG: 75 TABLET, FILM COATED ORAL at 08:13

## 2025-07-28 RX ADMIN — INSULIN ASPART 6 UNITS: 100 INJECTION, SOLUTION INTRAVENOUS; SUBCUTANEOUS at 18:58

## 2025-07-28 RX ADMIN — LOSARTAN POTASSIUM 25 MG: 25 TABLET, FILM COATED ORAL at 11:00

## 2025-07-28 RX ADMIN — ENOXAPARIN SODIUM 30 MG: 30 INJECTION SUBCUTANEOUS at 18:58

## 2025-07-28 RX ADMIN — INSULIN ASPART 12 UNITS: 100 INJECTION, SOLUTION INTRAVENOUS; SUBCUTANEOUS at 13:03

## 2025-07-28 RX ADMIN — HYDRALAZINE HYDROCHLORIDE 75 MG: 50 TABLET ORAL at 13:02

## 2025-07-28 ASSESSMENT — ACTIVITIES OF DAILY LIVING (ADL)
ADLS_ACUITY_SCORE: 49
ADLS_ACUITY_SCORE: 42
ADLS_ACUITY_SCORE: 49
ADLS_ACUITY_SCORE: 45
ADLS_ACUITY_SCORE: 45
ADLS_ACUITY_SCORE: 49
ADLS_ACUITY_SCORE: 49
ADLS_ACUITY_SCORE: 53
ADLS_ACUITY_SCORE: 45
ADLS_ACUITY_SCORE: 53
ADLS_ACUITY_SCORE: 45
ADLS_ACUITY_SCORE: 53
ADLS_ACUITY_SCORE: 53
ADLS_ACUITY_SCORE: 45
ADLS_ACUITY_SCORE: 42
ADLS_ACUITY_SCORE: 45
ADLS_ACUITY_SCORE: 53

## 2025-07-28 NOTE — PLAN OF CARE
Goal Outcome Evaluation:      Plan of Care Reviewed With: patient    Overall Patient Progress: no change    Patient slept most of the night. Awake between cares/toileting needs. Had one urinary incontinent episode but also up to toilet for voiding. No pain or discomfort reported. No PRN given/requested. Patient had a big drop from her bedtime BG that was from 295 to 92 at 0200 AM. Gave apple juice and patient requested to drink some of her Glucerna too. BP also monitored. Initially elevated but better after recheck. Fall precautions maintained, call light in reach, safety checks completed. Ensured bed alarm is on for safety. Family present at bedside.     Continue with POC.

## 2025-07-28 NOTE — PROGRESS NOTES
Discharge Planner Post-Acute Rehab OT:     Discharge Plan: Mod I ADL at home with spouse, HC    Precautions: fall, HTN (SBP <180)    Current Status:  ADLs:  Mobility: SBA FWW  Grooming: SBA FWW  Dressing: UB - Min A. LB - Min-Mod A. Feet - Total A.  Bathing: Tub transfer CGA <> ETB with grab bar/FWW. SBA tasks.  Toileting: SBA FWW <> toilet with gb. CGA FWW cares.  IADLs: Baseline IND med mgmt and cooking; shopping on [email or web address]; spouse typically drives but using [employer] more recently d/t reduced mobility. Discharge A recommendations TBD.   Vision/Cognition: WFL, injections baseline for retina health. Mild memory deficit baseline per dtr. Mild R inattention, increased memory deficit noted. Recommend formal assessment and monitor of functional performance. M-ACE completed 7/26, scored 23/30.    Assessment: Focus on edu in safe transfer tx, simulating home env. Discussed tub shower tx, use of ETB and grab bar placement w/ spouse present. Spouse agreeable to install of permanent grab bar following edu in preference over suction cup g/b for safety. Discussed placement in relation to shower head and tub bench positioning - vertical on wall w/ shower head, horizontal along back wall.     Other Barriers to Discharge (DME, Family Training, etc):   Level of physical A.    Social support: Lives with spouse (able to provide cognitive but not physical A); daughter Brandi lives locally but works/childcare and unable to provide daily assistance. Daughter Jasmyn lives in [place].    DME: Owns cane, FWW, shower chair and ETB. Recommend grab bars in either shower space.    Home environment: Able to live on main level with tub/shower BR; typically showers basement level WIS.

## 2025-07-28 NOTE — PROGRESS NOTES
"  [facility]   Acute Rehabilitation Unit  Daily progress note    INTERVAL HISTORY  Weekend and therapy notes reviewed, no acute events reported.    Judi Moore was seen at bedside this morning.  She reports that she is feeling well overall and making progress, though she notes things are still \"taxing\".  She reports she is sleeping well.  She is trying to increase her water intake, though admits she may not be drinking enough.  She denies any chest pain, shortness of breath, abdominal pain, nausea.  She is having some ongoing urinary frequency with Lasix.  She notes last BM was yesterday and having regularly.  We reviewed ongoing adjustments to her insulin and BP meds and discussed monitoring at home.  She also asked about eye injections that she missed due to this admission.  She denies other questions or concerns at this time.    With therapies, PT notes that she is progressing well, however function is guarded and slightly fear based despite good task performance. Will collab with OT for mod-I progression prior to discharge, safe from a balance perspective.     MEDICATIONS  Current Facility-Administered Medications   Medication Dose Route Frequency Provider Last Rate Last Admin    aspirin EC tablet 81 mg  81 mg Oral Daily Carol Hayes PA-C   81 mg at 07/28/25 0813    atorvastatin (LIPITOR) tablet 20 mg  20 mg Oral QPM Carol Hayes PA-C   20 mg at 07/27/25 2013    carvedilol (COREG) tablet 37.5 mg  37.5 mg Oral BID w/meals Parker Angeles MD   37.5 mg at 07/28/25 0813    clopidogrel (PLAVIX) tablet 75 mg  75 mg Oral Daily Carol Hayes PA-C   75 mg at 07/28/25 0813    enoxaparin ANTICOAGULANT (LOVENOX) injection 30 mg  30 mg Subcutaneous Q24H Carol Hayes PA-C   30 mg at 07/27/25 1631    furosemide (LASIX) tablet 40 mg  40 mg Oral Daily Carol Hayes PA-C   40 mg at 07/28/25 0813    hydrALAZINE (APRESOLINE) tablet 75 mg  75 mg " "Oral TID Parker Angeles MD   75 mg at 07/28/25 0812    insulin aspart (NovoLOG) injection (RAPID ACTING)   Subcutaneous TID w/meals Parker Angeles MD   10 Units at 07/28/25 0818    insulin aspart (NovoLOG) injection (RAPID ACTING)  1-10 Units Subcutaneous TID AC Carol Hayes PA-C   2 Units at 07/27/25 1827    insulin glargine (LANTUS PEN) injection 24 Units  24 Units Subcutaneous At Bedtime Parker Angeles MD        Vitamin D3 (CHOLECALCIFEROL) tablet 50 mcg  50 mcg Oral Daily Carol Hayes PA-C   50 mcg at 07/28/25 0812          Current Facility-Administered Medications   Medication Dose Route Frequency Provider Last Rate Last Admin    acetaminophen (TYLENOL) tablet 650 mg  650 mg Oral Q4H PRN Carol Hayes PA-C        bisacodyl (DULCOLAX) suppository 10 mg  10 mg Rectal Daily PRN Carol Hayes PA-C        glucose gel 15-30 g  15-30 g Oral Q15 Min PRN Carol Hayes PA-C        Or    dextrose 50 % injection 25-50 mL  25-50 mL Intravenous Q15 Min PRN Carol Hayes PA-C        Or    glucagon injection 1 mg  1 mg Subcutaneous Q15 Min PRN Carol Hayes PA-C        hydrALAZINE (APRESOLINE) tablet 10 mg  10 mg Oral 4x Daily PRN Carol Hayes PA-C   10 mg at 07/23/25 2253    insulin aspart (NovoLOG) injection (RAPID ACTING)   Subcutaneous With Snacks or Supplements Clare Weston APRN CNP   3 Units at 07/23/25 2015    melatonin tablet 3 mg  3 mg Oral At Bedtime PRN Carol Hayes PA-C        polyethylene glycol (MIRALAX) Packet 17 g  17 g Oral Daily PRN Carol Hayes PA-C        senna-docusate (SENOKOT-S/PERICOLACE) 8.6-50 MG per tablet 1 tablet  1 tablet Oral BID PRN Carol Hayes PA-C            PHYSICAL EXAM  BP (!) 156/54 (BP Location: Left arm)   Pulse 69   Temp 97.6  F (36.4  C) (Oral)   Resp 16   Ht 1.575 m (5' 2\")   Wt 83.6 kg (184 lb 4.9 oz)   SpO2 97%   BMI 33.71 kg/m    Gen: NAD, resting in bed  HEENT: " NC/AT, MMM  Cardio: RRR, no murmurs  Pulm: non-labored on room air, lungs CTA bilaterally  Abd: soft, non-tender, non-distended, bowel sounds present  Ext: trace edema in BLE in feet  Neuro/MSK: awake, alert, PERRL, EOMI, face symmetric, speech clear/fluent, moving all extremities actively in bed    LABS  CBC RESULTS:   Recent Labs   Lab Test 07/28/25  0748 07/24/25  0606 07/23/25  0855   WBC 6.2 6.1 5.6   RBC 5.22* 5.01 5.15   HGB 15.4 14.7 15.5   HCT 46.9 44.4 46.4   MCV 90 89 90   MCH 29.5 29.3 30.1   MCHC 32.8 33.1 33.4   RDW 12.8 12.6 12.6   * 123* 128*       Last Basic Metabolic Panel:  Recent Labs   Lab Test 07/28/25  0748 07/28/25  0739 07/28/25  0204 07/24/25  0751 07/24/25  0606 07/23/25  1149 07/23/25  0855     --   --   --  138  --  141   POTASSIUM 4.1  --   --   --  3.7  --  3.6   CHLORIDE 105  --   --   --  103  --  106   CO2 22  --   --   --  21*  --  23   ANIONGAP 15  --   --   --  14  --  12   * 126* 92   < > 306*   < > 209*   BUN 49.1*  --   --   --  45.4*  --  42.0*   CR 1.88*  --   --   --  1.89*  --  1.83*   GFRESTIMATED 28*  --   --   --  28*  --  29*   JURGEN 9.6  --   --   --  9.2  --  9.6    < > = values in this interval not displayed.        ASSESSMENT AND PLAN  Judi Moore is a 69 year old right hand dominant female with a past medical history of HTN, T2DM, HLD, CKD4, chronic lacunar infarcts of bilateral cerebellar hemispheres (on 5/2023 MRI), VANESSA, and OA s/p R TKA (2022) who was admitted on 7/19/25 with acute to subacute left internal capsule ischemic stroke with hospital course complicated by hyperglycemia, elevated BP, polycythemia, and thrombocytopenia.  She is now admitted to ARU on 07/23/25 for multidisciplinary rehabilitation and ongoing medical management.      Rehabilitation   Admission to acute inpatient rehab 07/23/25.    Impairment group code: Stroke Ischemic 01.2 (R) Body Involvement (L) Brain; Acute to subacute left internal capsule ischemic stroke,  etiology likely small vessel disease vs ESUS         PT and OT 90 minutes of each daily for 6 days per week for 10 days, in addition to rehab nursing and close management of physiatrist.       Impairment of ADL's: Noted to have L hemiplegia, facial weakness, impaired activity tolerance, impaired balance, impaired coordination, impaired sensation, and impaired weight shifting, all affecting her ability to safely and independently perform basic ADLs.  Goal for mod I with basic ADLs with exception of SBA for bathing.     Impairment of mobility:  Noted to have L hemiplegia, facial weakness, impaired activity tolerance, impaired balance, impaired coordination, impaired sensation, and impaired weight shifting, all affecting her ability to safely and independently perform basic mobility.  Goal for mod I with basic mobility with exception of SBA for stairs.    Continue comprehensive acute inpatient rehabilitation program with multidisciplinary approach including therapies, rehab nursing, and physiatry following. See interval history for updates.       Medical Conditions  New actions/orders/updates for today are in blue.     Acute to subacute left internal capsule ischemic stroke, etiology likely small vessel disease   History of prior cerebellar stroke (MRI 2023)  - SBP goal <130/80 long-term.  Management as below  - Continue DAPT with ASA 81 mg daily + Plavix 75 mg daily x21 days, followed by ASA 81 mg daily  - LDL goal 40-70, management as below  - Goal A1c<7, management as below  - Continue PT, OT, SLP  - Follow up with stroke RAY in 6-8 weeks     Hypertension  PTA on Coreg 37.5 mg BID, Lasix 40 mg daily, hydralazine 75 mg TID, losartan 100 mg daily  Elevated BP on arrival, initially allowed permissive hypertension but home meds gradually resumed.  BP elevated on ARU admission, but also note that inpatient team may multiple dose adjustments just today, so make take some time to take effect.  - Hospitalist consulted for  co-management, appreciate assistance  - SBP 120s-160s past several days  - BP goal <180 for acute phase; long-term goal <130/80.   - Continue PTA Coreg 37.5 mg BID, increased on 7/27  - Continue PTA Lasix 40 mg daily (resumed on 7/23)  - Continue PTA hydralazine 75 mg TID (increased on 7/25).    - Resume PTA Losartan today per IM, initially at lower dose of 25 mg daily.  Increase back to PTA dose as indicated/tolerated.  - Hydralazine 10 mg QID PRN for SBP >180  - Monitor BP, adjust regimen as indicated in partnership with medicine team     Hyperlipidemia  PTA on atorvastatin 10 mg daily, increased this admission with LDL 80  - Continue atorvastatin 20 mg daily     Type II diabetes mellitus  PTA on Novolog sliding scale insulin TID AC, carb coverage (1:13 breakfast, 1:20 lunch, 1:7 dinner), and glargine 10 units at HS  A1c 7.7%  BG quite elevated this admission, frequently into the 300s.  Seems patient has been resistant to recommendations to increase long-acting insulin dose despite requiring very high doses of short-acting insulin, poorly controlled BG, education on 50:50 basal/bolus recommendations.  Was agreeable to small increase just prior to discharge to ARU and has been open to ongoing adjustments.  Endocrinology weighed in on 7/25 but did not feel consult indicated at that time, felt adjustments primary teams had been making were appropriate and patient responding well.  Did provide some general guidance.  See their note on 7/25 for additional details.  - Hospitalist consulted for co-management, appreciate assistance  - Monitor BG TID AC + HS + 0200.  BG  past 24h  - Decrease Lantus to 24 units at HS given low reading overnight.  - Continue Novolog high intensity sliding scale insulin TID AC.  Stop HS sliding scale insulin given overnight lows.  - Increase Novolog to 1:6 g CHO TID AC.  Plan will be to transition to fixed meal dosing prior to discharge (with ongoing sliding scale insulin)  - Moderate  consistent carb diet  - Avoid metformin per endo given CKD  - Per endo, consider revisiting discussion re: SGLT-2 again given her CV/renal profile; she has declined this in the past. Would need to be in context of risk/benefit ratio.   - Hypoglycemia protocol  - Plan for diabetes education prior to discharge     CKD IV  Secondary hyperparathyroidism   Follows with [facility].  Suspected 2/2 diabetic nephropathy +/- HTN nephrosclerosis.  Baseline Cr ~1.6-2.0.  During this admission, stable in 1.6-1.8 range.  7/28: Cr stable 1.88, in chronic baseline range  - Avoid/limit nephrotoxins as able  - Repeat BMP tomorrow per IM     VANESSA  Does not tolerate CPAP  - Nocturnal hypoxia may contribute to vascular risk--consider alternate therapy in follow-up.      Polycythemia  Incidental finding.  Hgb 17.0 on admission.  Evaluated by hematology during this admission and additional labs were sent.  EPO WNL.  Iron, ferritin, sat index WNL; IBC low.  Peripheral smear unremarkable for any abnormal cells.  Hgb nearly WNL by discharge to ARU, sending team notes possible dehydration at admission contributing.  - JAK2 mutation labs pending.  Per hematology, results likely to take several weeks.  If positive, they will start on hydroxyurea for polycythemia vera  7/28: Hgb WNL 15.4  - Follow up with hematology      Thrombocytopenia  Platelets low throughout admission, stable at 128 on 7/23 7/28: platelets stable/mildly low at 146    Diabetic retinopathy  Macular edema  Follows with [facility], Dr. Ojeda.  Gets injections q8-10 weeks per patient report, missed scheduled injections on 7/22 due to this admission.  - Per patient request, reached out to her ophthalmology team to determine how long she can safely delay these injections and/or whether there is a need to wait a certain period of time post-stroke.  Worked with their team to reschedule for 8/11, they noted ok to delay until that time.  Info added to her  AVS       Adjustment to disability:  Clinical psychology to eval and treat if indicated  FEN: mod consistent CHO diet, regular texture, thin liquids  Bowel: continent with some functional constipation.  Has PRN bowel meds available.  Monitor and adjust regimen as indicated.  Bladder: mixed continence.  PVRs at admission without evidence of retention  DVT Prophylaxis: subcutaneous Lovenox  GI Prophylaxis: none, consider adding PPI given age + DAPT + Lovenox  Code: full; confirmed on admission  Disposition: goal for home  ELOS: tentative discharge date 7/31   Follow up Appointments on Discharge: PCP in 1-2 weeks, neurology with stroke RAY in 6-8 weeks, hematology, PM&R in 8-12 weeks, ophthalmology (scheduled 8/11)      35 minutes spent on the date of service doing chart review, history and exam, documentation, and further activities as noted above.       Carol Hayes PA-C  Physical Medicine & Rehabilitation

## 2025-07-28 NOTE — PROGRESS NOTES
"Discharge Planner Post-Acute Rehab PT:     Discharge Plan: home with supv, HHPT    Precautions: R hakeem, falls,     Current Status:  Bed Mobility: Ramila L/R roll, SBA sup>sit  Transfer: CGA-SBA FWW  Gait: 250' with FWW, CGA-SBNA  Stairs: 8-6\" stairs with R ascending rail, Ramila for stability  Balance: 150' with FWW and CGA, slowed pace and shortened step lengt  Groups: Falls Class     Outcome Measures:   TU/24: 36.7 sec with FWW    Michaels/ on     10MWT:  .43 m/s with FWW on      Assessment:  Pt reports relief with SCM/levator stretches and encouraged to perform 2-3x/day for improved pain and ROM. Pt demo improvements with gait quality and distance.      Other Barriers to Discharge (DME, Family Training, etc):   Family training:  Owns FWW and cane     "

## 2025-07-28 NOTE — PROGRESS NOTES
[facility]    Medicine Progress Note - Hospitalist Service    Date of Admission:  7/23/2025    Pt was seen, course reviewed with family and team.    Events for today (7/28)  BG 92 at 200 this am, BG otherwise 100s-200s  BP generally 140s-160s  HR  60s  Lantus was increased to 28 units daily yesterday, will reduce to 24 units in view of sl low BG early am  No hs coverage  Increase mealtime carb coverage to 1 unit per 6 grams--will need to transition to fixed dose (+ sliding scale insulin) at time of discharge  Carvedilol hs been  increased to 37.5 mg bid  Will start losartan 25 mg daily (PTA was receiving 100 mg daily)  Recheck BMP in am        Assessment & Plan     Judi Moore is a 69 year old female admitted on 7/23/2025. She A 69-year-old female with a complex medical history, including type 2 diabetes, hypertension, dyslipidemia, obstructive sleep apnea, CKD stage IV, and a prior cerebellar stroke, was admitted with a 1-day history of right upper extremity weakness, ataxia, and difficulty ambulating. These symptoms occurred in the context of persistent severe hypertension at home, with systolic blood pressures exceeding 200 mmHg despite medication compliance.    Upon admission, a CT scan of the head showed no acute intracranial process. However, an MRI of the brain on 7/20 revealed a small acute to early subacute infarct in the left internal capsule, likely secondary to small vessel disease, with chronic ischemic changes. An MRA of the head was normal, while the MRA of the neck showed 60-70% narrowing of the right internal carotid artery. Echocardiography indicated a normal ejection fraction with mild to moderate concentric left ventricular hypertrophy and no significant valvular pathology. She was loaded with Plavix and started on a regimen of Plavix 5 mg daily, in addition to continuing aspirin 81 mg daily. Dual antiplatelet therapy is planned for at least  three weeks.     The patient was initially managed with permissive hypertension, with a blood pressure goal of less than 180 mmHg during the acute phase, transitioning to a long-term goal of less than 130/80 mmHg. Her atorvastatin dose was increased from 10 mg to 20 mg daily. A neurology follow-up is scheduled in 6-8 weeks.    Patient was admitted to acute rehab on July 23, 2025 for ongoing rehabilitation.  Internal medicine is following for medical comanagement and management of blood pressure.    # Acute to Subacute Left Internal Capsule Ischemic Stroke     The stroke is likely secondary to small vessel disease. The patient was started on dual antiplatelet therapy with Plavix and aspirin, which will continue for at least three weeks.   - Blood pressure long-term goal of less than 130/80 mmHg.   - Neurology follow-up is scheduled in 6-8 weeks.  - Therapy per PM&R team  -BP management    # Hypertension  BP labile 120s-190s/ 60s  HR 60s  Patient's PTA medications include Coreg 37.5 mg twice daily, furosemide 40 mg daily, hydralazine 25 mg 3 times daily, losartan 100 mg daily.  Prior to transfer to the hospital, per discussion with hospitalist at [facility], patient should be at her long-term goal blood pressure treatment.  Carvedilol 37.5 mg bid  Hydralazine 75 mg bid  Furosemide 40 mg daily  Will start phase in of losartan, 25 mg daily today        # Polycythemia     The elevated hemoglobin and hematocrit were likely due to dehydration, as they normalized with IV fluids. A JAK2 mutation test is pending, and the patient will follow up with hematology and GI for further evaluation. Next-generation sequencing results are expected in 2-3 weeks.    # Type 2 Diabetes Mellitus      The patient's blood sugar levels are elevated, with an A1c of 7.7%. Unorthodox insulin regimen PTA with only Lantus 10 units daily, high aspart mealtime dose  Has never taken metformin  Lantus has been increased to 22 units daily  Currently  receiving aspart per sliding scale insulin and carb counting  BG remain elevated but sl low early am on 7/28  Will reduce Lantus to 24 units daily  Continue carb counting ( 1 unit per 8 gram) + sliding scale insulin coverage--may need more intense coverage  Patient does not wish to start metformin.   notes some uncertainty regarding carb counting; it ultimately be preferable to start fixed dose insulin in addition to sliding scale with meals        # Dyslipidemia     Atorvastatin was increased to 20 mg daily, with a long-term LDL goal of less than 70. The current LDL is 80.    # Obstructive Sleep Apnea      The patient is CPAP-intolerant, and alternate therapies will be considered in follow-up to address nocturnal hypoxia and reduce vascular risk.    # Chronic Kidney Disease Stage IV with Secondary Hyperparathyroidism   Monitor renal function, particularly with possibility of starting losartan        Diet: Combination Diet Regular Diet; Moderate Consistent Carb (60 g CHO per Meal) Diet; Thin Liquids (level 0)  Room Service  Snacks/Supplements Adult: Glucerna; With Meals    DVT Prophylaxis: Enoxaparin (Lovenox) SQ  Daniel Catheter: Not present  Lines: None     Cardiac Monitoring: None  Code Status: Full Code                   Parker Angeles MD  Hospitalist Service  [facility]  Securely message with [employer] (more info)  Text page via [employer] Paging/Directory   ______________________________________________________________________    Interval History   No new concerns  No symptoms of hypoglycemia  Appetite decreased from baseline    Physical Exam   Vital Signs: Temp: 97.6  F (36.4  C) Temp src: Oral BP: (!) 156/54 Pulse: 69   Resp: 16 SpO2: 97 % O2 Device: None (Room air)    Weight: 184 lbs 4.87 oz  Alert, fully oriented  Lungs clear  CV rrr  Abd soft  NEURO per PMR      Data   Recent Labs   Lab 07/28/25  0748 07/28/25  0739 07/28/25  0204 07/24/25  0753  07/24/25  0606 07/23/25  1149 07/23/25  0855 07/21/25  2145 07/21/25  1914   WBC 6.2  --   --   --  6.1  --  5.6  --   --    HGB 15.4  --   --   --  14.7  --  15.5  --   --    MCV 90  --   --   --  89  --  90  --   --    *  --   --   --  123*  --  128*  --   --      --   --   --  138  --  141  --  135   POTASSIUM 4.1  --   --   --  3.7  --  3.6  --  4.3   CHLORIDE 105  --   --   --  103  --  106  --  100   CO2 22  --   --   --  21*  --  23  --  23   BUN 49.1*  --   --   --  45.4*  --  42.0*  --  38.6*   CR 1.88*  --   --   --  1.89*  --  1.83*  --  1.66*   ANIONGAP 15  --   --   --  14  --  12  --  12   JURGEN 9.6  --   --   --  9.2  --  9.6  --  9.4   * 126* 92   < > 306*   < > 209*   < > 335*   ALBUMIN  --   --   --   --   --   --   --   --  3.6   PROTTOTAL  --   --   --   --   --   --   --   --  7.5   BILITOTAL  --   --   --   --   --   --   --   --  0.3   ALKPHOS  --   --   --   --   --   --   --   --  138   ALT  --   --   --   --   --   --   --   --  14   AST  --   --   --   --   --   --   --   --  30   LIPASE  --   --   --   --   --   --   --   --  19    < > = values in this interval not displayed.

## 2025-07-28 NOTE — PROGRESS NOTES
"Discharge Planner Post-Acute Rehab PT:     Discharge Plan: home with supv, HHPT    Precautions: R hakeem, falls,     Current Status:  Bed Mobility: SBA  Transfer: SBA FWW  Gait: ' FWW  Stairs: 8-6\" stairs with R ascending rail, Ramila for stability  Balance: Fair ambulate to ambulate without device, however will functionally use FWW.  Groups: Falls Class     Outcome Measures:   TU/24: 36.7 sec with FWW    Michaels/56 on     10MWT:  .43 m/s with FWW on      Assessment: Progressing well, however function is guarded and slightly fear based despite good task performance. Will collab with OT for mod-I progression prior to discharge, safe from a balance perspective.  Pt stating has some steps into home from garage but unsure number and if they at in good condition. PT to follow up with family.     Other Barriers to Discharge (DME, Family Training, etc):   Family training:  Owns FWW and cane     "

## 2025-07-28 NOTE — PLAN OF CARE
Pt is oriented x4. Able to make needs known. Denies pain, cp or SOB.A1 w/ walker and GB. Skin is intact. Mixed continence LBM 7/27. Pleasant and cooperative. SO at bedside. Call light is in reach, continue w/ POC.

## 2025-07-28 NOTE — PROGRESS NOTES
Call made to diabetes education department. A voice mail was left re discontinue plan and the need for education prior to that.

## 2025-07-28 NOTE — PROGRESS NOTES
"Discharge Planner Post-Acute Rehab PT:     Discharge Plan: home with supv, HHPT    Precautions: R hakeem, falls,     Current Status:  Bed Mobility: SBA  Transfer: SBA FWW  Gait: ' FWW  Stairs: 8-6\" stairs with R ascending rail, Ramila for stability  Balance: Fair ambulate to ambulate without device, however will functionally use FWW.  Groups: Falls Class     Outcome Measures:   TU/24: 36.7 sec with FWW    Michaels/56 on     10MWT:  .43 m/s with FWW on      Assessment: Progressing well, however function is guarded and slightly fear based despite good task performance. Will collab with OT for mod-I progression prior to discharge, safe from a balance perspective.    Other Barriers to Discharge (DME, Family Training, etc):   Family training:  Owns FWW and cane     "

## 2025-07-29 ENCOUNTER — APPOINTMENT (OUTPATIENT)
Dept: OCCUPATIONAL THERAPY | Facility: CLINIC | Age: 70
DRG: 057 | End: 2025-07-29
Attending: PHYSICAL MEDICINE & REHABILITATION
Payer: COMMERCIAL

## 2025-07-29 ENCOUNTER — APPOINTMENT (OUTPATIENT)
Dept: PHYSICAL THERAPY | Facility: CLINIC | Age: 70
DRG: 057 | End: 2025-07-29
Attending: PHYSICAL MEDICINE & REHABILITATION
Payer: COMMERCIAL

## 2025-07-29 LAB
ANION GAP SERPL CALCULATED.3IONS-SCNC: 12 MMOL/L (ref 7–15)
BUN SERPL-MCNC: 53.2 MG/DL (ref 8–23)
CALCIUM SERPL-MCNC: 9.2 MG/DL (ref 8.8–10.4)
CHLORIDE SERPL-SCNC: 105 MMOL/L (ref 98–107)
CREAT SERPL-MCNC: 2.07 MG/DL (ref 0.51–0.95)
EGFRCR SERPLBLD CKD-EPI 2021: 25 ML/MIN/1.73M2
GLUCOSE BLDC GLUCOMTR-MCNC: 105 MG/DL (ref 70–99)
GLUCOSE BLDC GLUCOMTR-MCNC: 141 MG/DL (ref 70–99)
GLUCOSE BLDC GLUCOMTR-MCNC: 170 MG/DL (ref 70–99)
GLUCOSE BLDC GLUCOMTR-MCNC: 192 MG/DL (ref 70–99)
GLUCOSE BLDC GLUCOMTR-MCNC: 196 MG/DL (ref 70–99)
GLUCOSE SERPL-MCNC: 112 MG/DL (ref 70–99)
HCO3 SERPL-SCNC: 24 MMOL/L (ref 22–29)
HOLD SPECIMEN: NORMAL
POTASSIUM SERPL-SCNC: 3.5 MMOL/L (ref 3.4–5.3)
SODIUM SERPL-SCNC: 141 MMOL/L (ref 135–145)

## 2025-07-29 PROCEDURE — 128N000003 HC R&B REHAB

## 2025-07-29 PROCEDURE — 80048 BASIC METABOLIC PNL TOTAL CA: CPT | Performed by: INTERNAL MEDICINE

## 2025-07-29 PROCEDURE — 36415 COLL VENOUS BLD VENIPUNCTURE: CPT | Performed by: INTERNAL MEDICINE

## 2025-07-29 PROCEDURE — 97530 THERAPEUTIC ACTIVITIES: CPT | Mod: GP

## 2025-07-29 PROCEDURE — 250N000013 HC RX MED GY IP 250 OP 250 PS 637: Performed by: INTERNAL MEDICINE

## 2025-07-29 PROCEDURE — 250N000013 HC RX MED GY IP 250 OP 250 PS 637: Performed by: PHYSICIAN ASSISTANT

## 2025-07-29 PROCEDURE — 99232 SBSQ HOSP IP/OBS MODERATE 35: CPT | Performed by: INTERNAL MEDICINE

## 2025-07-29 PROCEDURE — 99232 SBSQ HOSP IP/OBS MODERATE 35: CPT | Performed by: PHYSICAL MEDICINE & REHABILITATION

## 2025-07-29 PROCEDURE — 97535 SELF CARE MNGMENT TRAINING: CPT | Mod: GO

## 2025-07-29 PROCEDURE — 97112 NEUROMUSCULAR REEDUCATION: CPT | Mod: GP

## 2025-07-29 RX ADMIN — INSULIN ASPART 7 UNITS: 100 INJECTION, SOLUTION INTRAVENOUS; SUBCUTANEOUS at 14:42

## 2025-07-29 RX ADMIN — HYDRALAZINE HYDROCHLORIDE 75 MG: 50 TABLET ORAL at 14:23

## 2025-07-29 RX ADMIN — CARVEDILOL 37.5 MG: 25 TABLET, FILM COATED ORAL at 08:40

## 2025-07-29 RX ADMIN — ATORVASTATIN CALCIUM 20 MG: 10 TABLET, FILM COATED ORAL at 20:04

## 2025-07-29 RX ADMIN — CLOPIDOGREL BISULFATE 75 MG: 75 TABLET, FILM COATED ORAL at 08:39

## 2025-07-29 RX ADMIN — INSULIN ASPART 5 UNITS: 100 INJECTION, SOLUTION INTRAVENOUS; SUBCUTANEOUS at 18:59

## 2025-07-29 RX ADMIN — INSULIN ASPART 7 UNITS: 100 INJECTION, SOLUTION INTRAVENOUS; SUBCUTANEOUS at 08:54

## 2025-07-29 RX ADMIN — Medication 50 MCG: at 08:39

## 2025-07-29 RX ADMIN — ASPIRIN 81 MG: 81 TABLET, DELAYED RELEASE ORAL at 08:39

## 2025-07-29 RX ADMIN — HYDRALAZINE HYDROCHLORIDE 75 MG: 50 TABLET ORAL at 20:04

## 2025-07-29 RX ADMIN — FUROSEMIDE 40 MG: 40 TABLET ORAL at 08:39

## 2025-07-29 RX ADMIN — CARVEDILOL 37.5 MG: 25 TABLET, FILM COATED ORAL at 18:00

## 2025-07-29 RX ADMIN — HYDRALAZINE HYDROCHLORIDE 75 MG: 50 TABLET ORAL at 08:38

## 2025-07-29 RX ADMIN — LOSARTAN POTASSIUM 25 MG: 25 TABLET, FILM COATED ORAL at 08:39

## 2025-07-29 ASSESSMENT — ACTIVITIES OF DAILY LIVING (ADL)
ADLS_ACUITY_SCORE: 45
ADLS_ACUITY_SCORE: 44
ADLS_ACUITY_SCORE: 44
ADLS_ACUITY_SCORE: 45
ADLS_ACUITY_SCORE: 44
ADLS_ACUITY_SCORE: 45
ADLS_ACUITY_SCORE: 44
ADLS_ACUITY_SCORE: 44
ADLS_ACUITY_SCORE: 45
ADLS_ACUITY_SCORE: 45
ADLS_ACUITY_SCORE: 44
ADLS_ACUITY_SCORE: 45
ADLS_ACUITY_SCORE: 44

## 2025-07-29 NOTE — PROGRESS NOTES
Discharge Planner Post-Acute Rehab PT:     Discharge Plan: home with supv, HHPT    Precautions: R hakeem, falls,     Current Status:  Bed Mobility: IND  Transfer: Mod-I FWW  Gait: Mod-I 250' FWW  Stairs: CGA with rail  Balance: Fair ambulate to ambulate without device, however will functionally use FWW.  Groups: Falls Class     Outcome Measures:   TU/24: 36.7 sec with FWW    Michaels/56 on     10MWT:  .43 m/s with FWW on      Assessment: Progressed to mod-I in room, on track for discharge.    Other Barriers to Discharge (DME, Family Training, etc):   Owns FWW and cane

## 2025-07-29 NOTE — PLAN OF CARE
Acute Rehab Care Conference/Team Rounds      Type: Team Rounds    Present: Freddy oMe Elizabeth M  PA, Nancy Dwyer  Neuropsychologist, Chasidy Bennett  PT, Cayden Roach OT, Josey Limon  SLP, Althea Jolley RN CC, Lala Haque , Joan Hooker RD,Merline Sandra Therapy Supervisor, Jus Christianson RN, Teresa THOMPSON Patient.      Discharge Barriers/Treatment/Education    Rehab Diagnosis: Ischemic CVA    Active Medical Co-morbidities/Prognosis: HTN, HLD, DM II, CKD IV, secondary hyperparathyroidism, VANESSA, polycythemia, thrombocytopenia, diabetic retinopathy, macular edema    Safety: A&Ox4 calls appropriately able to make needs known     Pain: denies any pain     Medications, Skin, Tubes/Lines:    Swallowing/Nutrition: Regular consistency solids, thin liquids, consistent carbs    Bowel/Bladder: continent of B&B last bowel movement 7/27     Psychosocial:lives with spouse in house with 4 JOSE LUIS, pt's may anne is very supportive.     ADLs/IADLs:  Progressing well; updated pt to Mod I in room FWW-based following A to don  socks. Performance primarily limited by fatigue, mild R hand proprioceptive deficit, and memory deficit (baseline with family voicing more mildly more prominent since hospitalization). Mod I ADL with some A prn for lower body dressing d/t use of hospital clothes vs her own clothes with intermittent difficulty threading feet. Pt exhibits good safety awareness; however, fear of falling limits trial of tasks at times, but participates well in sessions. Has stairs inside home to access downstairs preferred bathroom, but able to make all needs met on main level. Completed meal prep task with good safety awareness to stovetop safety; benefit from cue for safe placement of hot items to ensure no risk of burning self following task; recommend pt complete meals supervision from spouse until demonstrated safety in own kitchen. Recommend MAP to assess med mgmt.  DME: Pt owns cane, FWW, and shower chair in WIS downstairs; recommend ETB and grab bar installation for main level tub/shower, and at toilet. Plan for discharging home with spouse cognitive A prn, local adult daughter for intermittent heavier IADL A, and HCOT.     Mobility: Pt is progressing well on unit. Functional performance outweighs objective measures and pt perception due to some fear based tendencies in therapy, but improving. Currently mod-I with FWW in room, CGA for stairs. On track for discharge this week with family support and  PT follow-up.    TU/24: 36.7 sec with FWW     Michaels/56 on      10MWT:  .43 m/s with FWW on      Cognition/Language:    Community Re-Entry: Limited re-entry with family assist    Transportation: Family to provide    Decision maker: self    Plan of Care and goals reviewed and updated.    Discharge Plan/Recommendations    Fall Precautions: continue    Patient/Family input to goals: Yes    Anticipated rehab needs following discharge:  PT, OT, RN    Anticipated care giver support after discharge: Family    Estimated length of stay: 8 days    Overall plan for the patient: Pt making excellent progress, now progressed to Mod I with a FWW but continues to have decreased confidence and high anxiety about mobility.  Appropriate to continue with current plan of care.       Utilization Review and Continued Stay Justification    Medical Necessity Criteria:    For any criteria that is not met, please document reason and plan for discharge, transfer, or modification of plan of care to address.    Requires intensive rehabilitation program to treat functional deficits?: Yes    Requires 3x per week or greater involvement of rehabilitation physician to oversee rehabilitation program?: Yes    Requires rehabilitation nursing interventions?: Yes    Patient is making functional progress?: Yes    There is a potential for additional functional progress? Yes    Patient is  participating in therapy 3 hours per day a minimum of 5 days per week or 15 hours per week in 7 day period?:Yes    Has discharge needs that require coordinated discharge planning approach?:Yes      Barriers/Concerns related to meeting medical necessity criteria:  Anxiety, decreased confidence in abilities    Team Plan to Address Concern:  Continued therapies to improve confidence and higher level activities      Final Physician Sign off    Statement of Approval: I have reviewed and agree with the recommendations and documentation in this team rounds note.       Patient Goals  Social Work Goals: Confirm discharge recommendations with therapy, coordinate safe discharge plan and remain available to support and assist as needed.     OT Predicted Duration/Target Date for Goal Attainment: 07/31/25  Therapy Frequency (OT): 6 times/week  OT: Hygiene/Grooming: modified independent, within precautions  OT: Upper Body Dressing: Modified independent, within precautions  OT: Lower Body Dressing: Modified independent, within precautions  OT: Upper Body Bathing: Modified independent, using adaptive equipment, within precautions  OT: Lower Body Bathing: Modified independent, using adaptive equipment, with precautions        OT: Toilet Transfer/Toileting: Modified independent, toilet transfer, cleaning and garment management, within precautions  OT: Meal Preparation: Modified independent, with simple meal preparation, within precautions  OT: Home Management: Modified independent, with light demand household tasks, within precautions  OT: Cognitive: Patient/caregiver will verbalize understanding of cognitive assessment results/recommendations as needed for safe discharge planning     OT: Goal 1: Pt will perform bathing transfer simulating home environment Mod I within precautions utilizing DME prn.                         PT Predicted Duration/Target Date for Goal Attainment: 08/01/25  PT Frequency: 6x/week  PT: Bed Mobility:  Modified independent, Bridging, Supine to/from sit  PT: Transfers: Modified independent, Sit to/from stand, Bed to/from chair, Assistive device  PT: Gait: Modified independent, 150 feet, Assistive device  PT: Stairs: 4 stairs, Supervision/stand-by assist  PT: Goal 1: car transfer supv                                                                                                      Goal: Mobility: Pt will progress to MOD I as indicated prior to discharge from ARU.  Goal: Skin Integrity: Pt will continue to be free from skin breakdown related to pressure/moisture throughout ARU stay.                    Goal: Safety Management: Pt will be free from falls/harm by using call light appropriately and waiting for asssistance as indicated.

## 2025-07-29 NOTE — CONSULTS
Diabetes CNS/Educator Consult    Judi Moore is a 69 year old female per notes with a past medical history of HTN, T2DM, HLD, CKD4, chronic lacunar infarcts of bilateral cerebellar hemispheres (on 5/2023 MRI), VANESSA, and OA s/p R TKA (2022) who was admitted on 7/19/25 with acute to subacute left internal capsule ischemic stroke with hospital course complicated by hyperglycemia, elevated BP, polycythemia, and thrombocytopenia.  She is now admitted to ARU on 07/23/25 for multidisciplinary rehabilitation and ongoing medical management.      Diabetes CNS/Educator consulted for general overview with T2DM. A1c 7.7% on 7/19/25.     Met with Judi at bedside for education. She will be managing diabetes cares at home independently but lives with spouse who can support. Judi explained that she was confused by hearing that long/rapid insulin should be 50/50 dosing but PTA her PCP and Diabetes Educator told her she should not go above Lantus 10 units daily.     PTA Regimen:  Per chart, Lantus 10 units daily at HS and Novolog 1 unit per 13 gram carb before breakfast, 1 unit per 20 gram carb before lunch, 1 unit per 7 gram carb before dinner.    Per patient, Lantus 10 units daily at HS and Novolog three times daily before meals (pre-meal BG divided by 15). She does not count carbs.    Was monitoring BGs at home with glucose meter (she stopped using Dexcom G7 CGM system due to discrepancies between CGM and meter).    Diet: Combination Diet Regular Diet; Moderate Consistent Carb (60 g CHO per Meal) Diet; Thin Liquids (level 0)  Room Service  Snacks/Supplements Adult: Glucerna; With Meals       Barriers to diabetes management: complex health needs (multiple comorbidities)    Diabetes Education Provided:  General Overview: Judi states she understands T2DM but wants to know more about insulin dosing recommendations to help manage BGs.   Insulin: Judi familiar with Lantus and Novolog action, onset, peak, duration, and frequency.  She knows how to do insulin pen injections. Discussed discharge insulin plan with Lantus daily at HS, fixed meal dose, and correction TID AC and HS. Reviewed plan a few times and Judi felt that she understood but would like ongoing reinforcement. Advised she practice insulin dosing with bedside RNs while she is here.   Monitoring: Frequency of BG testing discussed. She has 2 glucose meters and supplies at home. Discussed difference in Dexcom G7 CGM vs glucose meter readings. She will continue to use her glucose meters.    Hypoglycemia: She is familiar with hypoglycemia recognition and treatment.  When to call: Advised calling provider for consistently elevated BGs and/or lows x2.   Follow-up: Discussed following up with PCP in 1-2 weeks after discharge.     Discussion:  Diabetes education completed. Judi was engaged and showed motivation to work on diabetes management. Appeared that there was confusion with PTA dosing for what was prescribed versus what she was doing. She is agreeable to recommended discharge regimen of Lantus daily at HS, fixed meal dose, and correction TID AC and HS. Advised she continue reviewing dosing with bedside RNs. Discussed with bedside RNs, PMR RAY, and notified charge RN.    Supplies Needed at Discharge: Judi says she already has 2 glucose meters and enough supplies.    TENTATIVE Diabetes Plan Reviewed: instructed to refer to discharge papers for final plan  Blood glucose monitoring:   Three times daily before meals, and at bedtime.  Blood glucose (BG) goal:  mg/dL before meals. Less than 200 mg/dL at bedtime.       Glucose Control Regimen:  Insulin glargine (Lantus) - take 24 units once daily at 10:00 PM. Take at same time each day.    Insulin aspart (Novolog) carbohydrate coverage  Take ? units before average meals that contain carbohydrates.  Take ? units before small meals or snacks that contain carbohydrates.    Insulin aspart (Novolog) correction - see chart below.   Take  for high blood glucoses three times daily before meals and at bedtime.  You may take the correction dose even if you skip a meal (as long as it has been 4 hours since previous correction dose).    Pre-meal correction scale:    Blood Glucose Novolog Before Meals:   Less than 140 0 units   140 - 164 1 unit   165 - 189 2 units   190 - 214 3 units   215 - 239 4 units   240 - 264 5 units   265 - 289 6 units   290 - 314 7 units   315 - 339 8 units   340 - 364 9 units   365 or more 10 units   Bedtime correction scale:     Blood Glucose Novolog At Bedtime:   Less than 200 0 units   200 - 224 1 unit   225 - 249 2 units   250 - 274 3 units   275 - 299 4 units   300 - 324 5 units   325 - 349 6 units   350 or more 7 units     Outpatient Follow-Up:   Follow up with your Primary Care Provider (PCP) in 1-2 weeks, bring glucose data to your appointment. Call sooner if blood glucose runs consistently greater than 200 mg/dL for one day or if having more than two episodes less than 70 mg/dL.    Your target A1c value is less than 7% to help prevent future complications from diabetes. Your most recent A1c is 7.7%       Hypoglycemia (Low Blood Glucose) Management:  Signs/symptoms:  Shaking, sweating, fast heartbeat  Feeling dizzy, tired, or weak   Feeling anxious and easy to irritate  Feeling nervous, crabby, or confused  Hunger  Vision problems, headache  Numb or tingling mouth    If blood glucose is 51 to 70:   Eat or drink 15 grams of carbohydrate. Examples:   1/2 cup (4 ounces) apple juice or regular soda pop, or   1 cup (8 ounces) milk, or   15 skittles, or   3 to 4 glucose tablets.   Re-check your blood glucose in 15 minutes.   Repeat these steps every 15 minutes until your blood glucose is above 80.       If blood glucose is 50 or less:   Eat or drink 30 grams of carbohydrate. Examples:   1 cup (8 ounces) apple juice or regular soda pop, or   2 cups (16 ounces) milk, or   1 banana, or   6 to 8 glucose tablets.   Re-check your  blood glucose in 15 minutes.   Repeat these steps every 15 minutes until your blood glucose is above 80.    PASTOR Bedoya AGCNS, [facility]  Diabetes Educator  Pager: [phone]  Office: [phone]  Securely message with Br[employer]

## 2025-07-29 NOTE — PHARMACY-ADMISSION MEDICATION HISTORY
Admission medication history completed at [facility]. Please see Pharmacy Intern Admission Medication History note from 07/20/2025.

## 2025-07-29 NOTE — PROGRESS NOTES
[facility]    Medicine Progress Note - Hospitalist Service    Date of Admission:  7/23/2025      Today's updates 7/29/25  -No acute events overnight.   -BP stable this morning (134/52).   -Cr 2.07 today, trending up. Hold Losartan today.       Assessment & Plan     Judi Moore is a 69 year old female admitted on 7/23/2025. She A 69-year-old female with a complex medical history, including type 2 diabetes, hypertension, dyslipidemia, obstructive sleep apnea, CKD stage IV, and a prior cerebellar stroke, was admitted with a 1-day history of right upper extremity weakness, ataxia, and difficulty ambulating. These symptoms occurred in the context of persistent severe hypertension at home, with systolic blood pressures exceeding 200 mmHg despite medication compliance.    Upon admission, a CT scan of the head showed no acute intracranial process. However, an MRI of the brain on 7/20 revealed a small acute to early subacute infarct in the left internal capsule, likely secondary to small vessel disease, with chronic ischemic changes. An MRA of the head was normal, while the MRA of the neck showed 60-70% narrowing of the right internal carotid artery. Echocardiography indicated a normal ejection fraction with mild to moderate concentric left ventricular hypertrophy and no significant valvular pathology. She was loaded with Plavix and started on a regimen of Plavix 5 mg daily, in addition to continuing aspirin 81 mg daily. Dual antiplatelet therapy is planned for at least three weeks.     The patient was initially managed with permissive hypertension, with a blood pressure goal of less than 180 mmHg during the acute phase, transitioning to a long-term goal of less than 130/80 mmHg. Her atorvastatin dose was increased from 10 mg to 20 mg daily. A neurology follow-up is scheduled in 6-8 weeks.    Patient was admitted to acute rehab on July 23, 2025 for ongoing rehabilitation.   Internal medicine is following for medical comanagement and management of blood pressure.    # Acute to Subacute Left Internal Capsule Ischemic Stroke     The stroke is likely secondary to small vessel disease. The patient was started on dual antiplatelet therapy with Plavix and aspirin, which will continue for at least three weeks.   - Blood pressure long-term goal of less than 130/80 mmHg.   - Neurology follow-up is scheduled in 6-8 weeks.  - Therapy per PM&R team  - BP stable today, continue monitoring.     # Hypertension  BP labile 120s-190s/ 60s  HR 60s  Patient's PTA medications include Coreg 37.5 mg twice daily, furosemide 40 mg daily, hydralazine 25 mg 3 times daily, losartan 100 mg daily.  Prior to transfer to the hospital, per discussion with hospitalist at [facility], patient should be at her long-term goal blood pressure treatment.  Carvedilol 37.5 mg bid  Hydralazine 75 mg bid  Furosemide 40 mg daily  Hold Losartan due to Cr trending up     # Polycythemia     The elevated hemoglobin and hematocrit were likely due to dehydration, as they normalized with IV fluids. A JAK2 mutation test is pending, and the patient will follow up with hematology and GI for further evaluation. Next-generation sequencing results are expected in 2-3 weeks.    # Type 2 Diabetes Mellitus      The patient's blood sugar levels are elevated, with an A1c of 7.7%. Unorthodox insulin regimen PTA with only Lantus 10 units daily, high aspart mealtime dose  Has never taken metformin  Lantus has been increased to 22 units daily  Currently receiving aspart per sliding scale insulin and carb counting  BG remain elevated but sl low early am on 7/28  Will reduce Lantus to 24 units daily  Continue carb counting ( 1 unit per 8 gram) + sliding scale insulin coverage--may need more intense coverage  Patient does not wish to start metformin.   notes some uncertainty regarding carb counting; it ultimately be preferable to start fixed dose  insulin in addition to sliding scale with meals    Recent Labs   Lab 07/29/25  1148 07/29/25  0816 07/29/25  0558 07/29/25  0220 07/28/25  2056 07/28/25  1733   * 105* 112* 141* 136* 88       # Dyslipidemia     Atorvastatin was increased to 20 mg daily, with a long-term LDL goal of less than 70. The current LDL is 80.    # Obstructive Sleep Apnea      The patient is CPAP-intolerant, and alternate therapies will be considered in follow-up to address nocturnal hypoxia and reduce vascular risk.    # Chronic Kidney Disease Stage IV with Secondary Hyperparathyroidism  - Cr 2.07.   - Continue monitoring.   - Avoid nephrotoxic medications         Diet: Combination Diet Regular Diet; Moderate Consistent Carb (60 g CHO per Meal) Diet; Thin Liquids (level 0)  Room Service  Snacks/Supplements Adult: Glucerna; With Meals    DVT Prophylaxis: Enoxaparin (Lovenox) SQ  Daniel Catheter: Not present  Lines: None     Cardiac Monitoring: None  Code Status: Full Code           Junior Mauricio MD  Hospitalist Service  [facility]  Securely message with [employer] (more info)  Text page via [facility] Paging/Directory   ______________________________________________________________________    Interval History     Acute events as above  Pleasant     Physical Exam   Vital Signs: Temp: 98.4  F (36.9  C) Temp src: Oral BP: 134/52 Pulse: 69   Resp: 17 SpO2: 97 % O2 Device: None (Room air)    Weight: 184 lbs 4.87 oz  Alert, fully oriented  Lungs clear  CV rrr  Abd soft, + BS, no tenderness to palpation   NEURO per PMR      Data   Recent Labs   Lab 07/29/25  0816 07/29/25  0558 07/29/25  0220 07/28/25  1211 07/28/25  0748 07/24/25  0751 07/24/25  0606 07/23/25  1149 07/23/25  0855   WBC  --   --   --   --  6.2  --  6.1  --  5.6   HGB  --   --   --   --  15.4  --  14.7  --  15.5   MCV  --   --   --   --  90  --  89  --  90   PLT  --   --   --   --  146*  --  123*  --  128*   NA  --  141  --   --  142   --  138  --  141   POTASSIUM  --  3.5  --   --  4.1  --  3.7  --  3.6   CHLORIDE  --  105  --   --  105  --  103  --  106   CO2  --  24  --   --  22  --  21*  --  23   BUN  --  53.2*  --   --  49.1*  --  45.4*  --  42.0*   CR  --  2.07*  --   --  1.88*  --  1.89*  --  1.83*   ANIONGAP  --  12  --   --  15  --  14  --  12   JURGEN  --  9.2  --   --  9.6  --  9.2  --  9.6   * 112* 141*   < > 127*   < > 306*   < > 209*    < > = values in this interval not displayed.

## 2025-07-29 NOTE — PROGRESS NOTES
[facility]   Acute Rehabilitation Unit  Daily progress note    INTERVAL HISTORY  Notes reviewed.  Judi Moore was seen and discussed this morning in team rounds.  No acute events overnight and today Judi denies any problems including any chest pain or shortness of breath.  Functionally has now progressed to modified independent in the room with a front wheel walker although continues to have increased anxiety regarding mobility and discharge plans.  She is noted to have short-term memory deficits and benefits from increased repetition for learning.    Current Functional Status:  PT:  Bed Mobility: IND  Transfer: Mod-I FWW  Gait: Mod-I 250' FWW  Stairs: CGA with rail  Balance: Fair ambulate to ambulate without device, however will functionally use FWW.  Groups: Falls Class      Outcome Measures:   TU/24: 36.7 sec with FWW     Michaels/56 on      10MWT:  .43 m/s with FWW on       Assessment: Progressed to mod-I in room, on track for discharge.    OT:  ADLs:  Mobility: Mod I FWW and shoes/ socks  Grooming: Mod I FWW  Dressing: Mod I FWW; sit in chair. IND with slip on shoes; A for socks.  Bathing: Tub transfer CGA <> ETB with grab bar/FWW. SBA tasks.  Toileting: Mod I FWW and grab bar; items in reach.  IADLs: Baseline IND med mgmt and cooking; shopping on [employer]; spouse typically drives but using [employer] more recently d/t reduced mobility. . Completed stovetop meal prep supervision FWW at ARU; good safety for stovetop mgmt.   Vision/Cognition: WFL, injections baseline for retina health. Mild memory deficit baseline per dtr. Mild R inattention, increased memory deficit noted. Recommend formal assessment and monitor of functional performance. M-ACE completed , scored 23/30 with deficits in memory.     Assessment: Progressed to Mod I FWW in room; may need A for  socks from ns if prefers over IND with slip on shoes.        MEDICATIONS  Current Facility-Administered Medications   Medication Dose Route Frequency Provider Last Rate Last Admin    aspirin EC tablet 81 mg  81 mg Oral Daily Carol Hayes PA-C   81 mg at 07/29/25 0839    atorvastatin (LIPITOR) tablet 20 mg  20 mg Oral QPM Carol Hayes PA-C   20 mg at 07/28/25 2045    carvedilol (COREG) tablet 37.5 mg  37.5 mg Oral BID w/meals Parker Anegles MD   37.5 mg at 07/29/25 0840    clopidogrel (PLAVIX) tablet 75 mg  75 mg Oral Daily Carol Hayes PA-C   75 mg at 07/29/25 0839    enoxaparin ANTICOAGULANT (LOVENOX) injection 30 mg  30 mg Subcutaneous Q24H Carol Hayes PA-C   30 mg at 07/28/25 1858    furosemide (LASIX) tablet 40 mg  40 mg Oral Daily Carol Hayes PA-C   40 mg at 07/29/25 0839    hydrALAZINE (APRESOLINE) tablet 75 mg  75 mg Oral TID Parker Angeles MD   75 mg at 07/29/25 1423    insulin aspart (NovoLOG) injection (RAPID ACTING)   Subcutaneous TID w/meals Parker Angeles MD   7 Units at 07/29/25 1442    insulin aspart (NovoLOG) injection (RAPID ACTING)  1-10 Units Subcutaneous TID AC Carol Hayes PA-C   3 Units at 07/29/25 1155    insulin glargine (LANTUS PEN) injection 24 Units  24 Units Subcutaneous At Bedtime Parker Angeles MD   24 Units at 07/28/25 2057    [Held by provider] losartan (COZAAR) tablet 25 mg  25 mg Oral Daily Parker Angeles MD   25 mg at 07/29/25 0839    Vitamin D3 (CHOLECALCIFEROL) tablet 50 mcg  50 mcg Oral Daily Carol Hayes PA-C   50 mcg at 07/29/25 0839          Current Facility-Administered Medications   Medication Dose Route Frequency Provider Last Rate Last Admin    acetaminophen (TYLENOL) tablet 650 mg  650 mg Oral Q4H PRN Carol Hayes PA-C        bisacodyl (DULCOLAX) suppository 10 mg  10 mg Rectal Daily PRN Carol Hayes PA-C        glucose gel 15-30 g  15-30 g Oral Q15 Min PRN Carol Hayes PA-C        Or    dextrose 50 %  "injection 25-50 mL  25-50 mL Intravenous Q15 Min PRN Carol Hayes PA-C        Or    glucagon injection 1 mg  1 mg Subcutaneous Q15 Min PRN Carol Hayes PA-C        hydrALAZINE (APRESOLINE) tablet 10 mg  10 mg Oral 4x Daily PRN Carol Hayes PA-C   10 mg at 07/23/25 2253    insulin aspart (NovoLOG) injection (RAPID ACTING)   Subcutaneous With Snacks or Supplements Clare Weston APRN CNP   3 Units at 07/23/25 2015    melatonin tablet 3 mg  3 mg Oral At Bedtime PRN Carol Hayes PA-C        polyethylene glycol (MIRALAX) Packet 17 g  17 g Oral Daily PRN Carol Hayes PA-C        senna-docusate (SENOKOT-S/PERICOLACE) 8.6-50 MG per tablet 1 tablet  1 tablet Oral BID PRN Carol Hayes PA-C            PHYSICAL EXAM  /56 (BP Location: Right arm)   Pulse 67   Temp 97.5  F (36.4  C) (Oral)   Resp 16   Ht 1.575 m (5' 2\")   Wt 83.6 kg (184 lb 4.9 oz)   SpO2 98%   BMI 33.71 kg/m    Gen: NAD, sitting up in chair  HEENT: NC/AT  Pulm: non-labored on room air  Abd: Non-distended  Ext: trace edema in BLE, no calf tenderness  Neuro/MSK: awake, alert, face symmetric, speech clear/fluent, moving all extremities spontaneously    LABS  CBC RESULTS:   Recent Labs   Lab Test 07/28/25  0748 07/24/25  0606 07/23/25  0855   WBC 6.2 6.1 5.6   RBC 5.22* 5.01 5.15   HGB 15.4 14.7 15.5   HCT 46.9 44.4 46.4   MCV 90 89 90   MCH 29.5 29.3 30.1   MCHC 32.8 33.1 33.4   RDW 12.8 12.6 12.6   * 123* 128*       Last Basic Metabolic Panel:  Recent Labs   Lab Test 07/29/25  1148 07/29/25  0816 07/29/25  0558 07/28/25  1211 07/28/25  0748 07/24/25  0751 07/24/25  0606   NA  --   --  141  --  142  --  138   POTASSIUM  --   --  3.5  --  4.1  --  3.7   CHLORIDE  --   --  105  --  105  --  103   CO2  --   --  24  --  22  --  21*   ANIONGAP  --   --  12  --  15  --  14   * 105* 112*   < > 127*   < > 306*   BUN  --   --  53.2*  --  49.1*  --  45.4*   CR  --   --  2.07*  --  1.88*  --  " 1.89*   GFRESTIMATED  --   --  25*  --  28*  --  28*   JURGEN  --   --  9.2  --  9.6  --  9.2    < > = values in this interval not displayed.        ASSESSMENT AND PLAN  Judi Moore is a 69 year old right hand dominant female with a past medical history of HTN, T2DM, HLD, CKD4, chronic lacunar infarcts of bilateral cerebellar hemispheres (on 5/2023 MRI), VANESSA, and OA s/p R TKA (2022) who was admitted on 7/19/25 with acute to subacute left internal capsule ischemic stroke with hospital course complicated by hyperglycemia, elevated BP, polycythemia, and thrombocytopenia.  She is now admitted to ARU on 07/23/25 for multidisciplinary rehabilitation and ongoing medical management.      Rehabilitation   Admission to acute inpatient rehab 07/23/25.    Impairment group code: Stroke Ischemic 01.2 (R) Body Involvement (L) Brain; Acute to subacute left internal capsule ischemic stroke, etiology likely small vessel disease vs ESUS         PT and OT 90 minutes of each daily for 6 days per week for 10 days, in addition to rehab nursing and close management of physiatrist.       Impairment of ADL's: Noted to have L hemiplegia, facial weakness, impaired activity tolerance, impaired balance, impaired coordination, impaired sensation, and impaired weight shifting, all affecting her ability to safely and independently perform basic ADLs.  Goal for mod I with basic ADLs with exception of SBA for bathing.     Impairment of mobility:  Noted to have L hemiplegia, facial weakness, impaired activity tolerance, impaired balance, impaired coordination, impaired sensation, and impaired weight shifting, all affecting her ability to safely and independently perform basic mobility.  Goal for mod I with basic mobility with exception of SBA for stairs.    Continue comprehensive acute inpatient rehabilitation program with multidisciplinary approach including therapies, rehab nursing, and physiatry following. See interval history for updates.        Medical Conditions  New actions/orders/updates for today are in blue.     Acute to subacute left internal capsule ischemic stroke, etiology likely small vessel disease   History of prior cerebellar stroke (MRI 2023)  - SBP goal <130/80 long-term.  Management as below  - Continue DAPT with ASA 81 mg daily + Plavix 75 mg daily x21 days (last day 8/10) , followed by ASA 81 mg daily  - LDL goal 40-70, management as below  - Goal A1c<7, management as below  - Continue PT, OT, SLP  - Follow up with stroke RAY in 6-8 weeks     Hypertension  PTA on Coreg 37.5 mg BID, Lasix 40 mg daily, hydralazine 75 mg TID, losartan 100 mg daily  Elevated BP on arrival, initially allowed permissive hypertension but home meds gradually resumed.  BP elevated on ARU admission, but also note that inpatient team may multiple dose adjustments just today, so make take some time to take effect.  - Hospitalist consulted for co-management, appreciate assistance  - SBP 120s-160s past several days  - BP goal <180 for acute phase; long-term goal <130/80.   - Continue PTA Coreg 37.5 mg BID, increased on 7/27  - Continue PTA Lasix 40 mg daily (resumed on 7/23)  - Continue PTA hydralazine 75 mg TID (increased on 7/25).    - Resumed PTA Losartan per IM on 7/28, initially at lower dose of 25 mg daily, however Cr today increased to 2.07 and has been placed on hold again today.  - Hydralazine 10 mg QID PRN for SBP >180  - Monitor BP, adjust regimen as indicated in partnership with medicine team     Hyperlipidemia  PTA on atorvastatin 10 mg daily, increased this admission with LDL 80  - Continue atorvastatin 20 mg daily     Type II diabetes mellitus  PTA on Novolog sliding scale insulin TID AC, carb coverage (1:13 breakfast, 1:20 lunch, 1:7 dinner), and glargine 10 units at HS  A1c 7.7%  BG quite elevated this admission, frequently into the 300s.  Seems patient has been resistant to recommendations to increase long-acting insulin dose despite requiring  very high doses of short-acting insulin, poorly controlled BG, education on 50:50 basal/bolus recommendations.  Was agreeable to small increase just prior to discharge to ARU and has been open to ongoing adjustments.  Endocrinology weighed in on 7/25 but did not feel consult indicated at that time, felt adjustments primary teams had been making were appropriate and patient responding well.  Did provide some general guidance.  See their note on 7/25 for additional details.  - Hospitalist consulted for co-management, appreciate assistance  - Monitor BG TID AC + HS + 0200.  BG  past 24h  - Decreased Lantus to 24 units at HS given low reading overnight.  - Continue Novolog high intensity sliding scale insulin TID AC.  Stop HS sliding scale insulin given overnight lows.  - Increase Novolog to 1:6 g CHO TID AC.  Plan will be to transition to fixed meal dosing prior to discharge (with ongoing sliding scale insulin)  - Moderate consistent carb diet  - Avoid metformin per endo given CKD  - Per endo, consider revisiting discussion re: SGLT-2 again given her CV/renal profile; she has declined this in the past. Would need to be in context of risk/benefit ratio.   - Hypoglycemia protocol  - Plan for diabetes education prior to discharge     CKD IV  Secondary hyperparathyroidism   Follows with [facility].  Suspected 2/2 diabetic nephropathy +/- HTN nephrosclerosis.  Baseline Cr ~1.6-2.0.  During this admission, stable in 1.6-1.8 range.  7/29: Cr increased to 2.07, slightly above suspected baseline  - Avoid/limit nephrotoxins as able  - Holding Losartan as above  - Re-check BMP in am     VANESSA  Does not tolerate CPAP  - Nocturnal hypoxia may contribute to vascular risk--consider alternate therapy in follow-up.      Polycythemia  Incidental finding.  Hgb 17.0 on admission.  Evaluated by hematology during this admission and additional labs were sent.  EPO WNL.  Iron, ferritin, sat index WNL; IBC low.  Peripheral  smear unremarkable for any abnormal cells.  Hgb nearly WNL by discharge to ARU, sending team notes possible dehydration at admission contributing.  - JAK2 mutation labs pending.  Per hematology, results likely to take several weeks.  If positive, they will start on hydroxyurea for polycythemia vera  7/28: Hgb WNL 15.4  - Follow up with hematology      Thrombocytopenia  Platelets low throughout admission, stable at 128 on 7/23 7/28: platelets stable/mildly low at 146    Diabetic retinopathy  Macular edema  Follows with [facility], Dr. Ojeda.  Gets injections q8-10 weeks per patient report, missed scheduled injections on 7/22 due to this admission.  - Per patient request, reached out to her ophthalmology team to determine how long she can safely delay these injections and/or whether there is a need to wait a certain period of time post-stroke.  Worked with their team to reschedule for 8/11, they noted ok to delay until that time.  Info added to her AVS       Adjustment to disability:  Clinical psychology to eval and treat if indicated  FEN: mod consistent CHO diet, regular texture, thin liquids  Bowel: continent with some functional constipation.  Has PRN bowel meds available.  Monitor and adjust regimen as indicated.  Bladder: mixed continence.  PVRs at admission without evidence of retention  DVT Prophylaxis: Discontinue Lovenox given ambulation distances  GI Prophylaxis: none, consider adding PPI given age + DAPT + Lovenox  Code: full; confirmed on admission  Disposition: home  ELOS: tentative discharge date 7/31   Follow up Appointments on Discharge: PCP in 1-2 weeks, neurology with stroke RAY in 6-8 weeks, hematology, PM&R in 8-12 weeks, ophthalmology (scheduled 8/11)      45 minutes spent on the date of service doing chart review, history and exam, documentation, and further activities as noted above.     Aubrey Ro MD  Department of Rehabilitation Medicine

## 2025-07-29 NOTE — PLAN OF CARE
Goal Outcome Evaluation:      Plan of Care Reviewed With: patient    Overall Patient Progress: no changeOverall Patient Progress: no change       No acute issues during shift, A&Ox4, SBA FWW, continent of B&B LBM 7/29, BS checks 136 &141,  denies pain, R/T/W,  able to make needs known.

## 2025-07-29 NOTE — PROGRESS NOTES
Discharge Planner Post-Acute Rehab OT:     Discharge Plan: Mod I ADL at home with spouse, HC    Precautions: fall, HTN (SBP <180)    Current Status:  ADLs:  Mobility: Mod I FWW and shoes/ socks  Grooming: Mod I FWW  Dressing: Mod I FWW; sit in chair. IND with slip on shoes; A for socks.  Bathing: Tub transfer CGA <> ETB with grab bar/FWW. SBA tasks.  Toileting: Mod I FWW and grab bar; items in reach.  IADLs: Baseline IND med mgmt and cooking; shopping on [employer]; spouse typically drives but using [employer] more recently d/t reduced mobility. . Completed stovetop meal prep supervision FWW at ARU; good safety for stovetop mgmt.   Vision/Cognition: WFL, injections baseline for retina health. Mild memory deficit baseline per dtr. Mild R inattention, increased memory deficit noted. Recommend formal assessment and monitor of functional performance. M-ACE completed 7/26, scored 23/30 with deficits in memory.    Assessment: Progressed to Mod I FWW in room; may need A for  socks from nsg if prefers over IND with slip on shoes.     Pt seen for interdisciplinary rounds, see POC note. Completed  Recommend MAP.    Other Barriers to Discharge (DME, Family Training, etc):   Level of physical A.    Social support: Lives with spouse (able to provide cognitive but not physical A); daughter Brandi lives locally but works/childcare and unable to provide daily assistance. Daughter Jasmyn lives in [place].    DME: Owns cane, FWW, shower chair and ETB. Recommend grab bars in either shower spaces, and grab bar at toilet.    Home environment: Able to live on main level with tub/shower BR; typically showers basement level WIS.

## 2025-07-29 NOTE — PROGRESS NOTES
ARU Social Work Progress Notes     Team rounds today; targeting a discharge on Thursday with home care services. Home Care Services already set-up with [facility] and family support.    Home Health Care:  [facility]  PH: [phone]  Nurse, physical therapy and occupational therapy  -You will get a call after you have [facility] to schedule the first home care visit. The home health nurse should come out within the first 24-48 hours. If you don't get a call from them within the first 48 hours, please call to follow up.     TRACY Olivares  [facility], Acute Inpatient Rehab Unit   [facility] [address] [place] [address]  Phone: [phone]  Fax: [phone]

## 2025-07-29 NOTE — PLAN OF CARE
"Goal Outcome Evaluation:      Plan of Care Reviewed With: patient    Overall Patient Progress: no changeOverall Patient Progress: no change       Neuro/Orientation: A&Ox4, able to make needs known, denied N/T  Respiratory: stable on RA, denied SOB and chest pain   Activity/Transfer: MOD I room with walker  Diet: MOD consistent carb diet- thin liquids- pills whole, good appetite, BG check and carb coverage  105, 196, 170  GI/: continent of bowel and bladder-LBM 7/27  Skin: scattered bruising to abdomen and BUE.   Pain: denied   LDA: none  Plan: TBD, continue with POC  Additional Notes: BP fluctuating throughout the day but no PRN hydralazine needed.     Patient most recent vitals:  BP (!) 145/57   Pulse 68   Temp 97.5  F (36.4  C) (Oral)   Resp 16   Ht 1.575 m (5' 2\")   Wt 83.6 kg (184 lb 4.9 oz)   SpO2 98%   BMI 33.71 kg/m                "

## 2025-07-30 ENCOUNTER — APPOINTMENT (OUTPATIENT)
Dept: PHYSICAL THERAPY | Facility: CLINIC | Age: 70
DRG: 057 | End: 2025-07-30
Attending: PHYSICAL MEDICINE & REHABILITATION
Payer: COMMERCIAL

## 2025-07-30 ENCOUNTER — APPOINTMENT (OUTPATIENT)
Dept: OCCUPATIONAL THERAPY | Facility: CLINIC | Age: 70
DRG: 057 | End: 2025-07-30
Attending: PHYSICAL MEDICINE & REHABILITATION
Payer: COMMERCIAL

## 2025-07-30 ENCOUNTER — PATIENT OUTREACH (OUTPATIENT)
Dept: ONCOLOGY | Facility: CLINIC | Age: 70
End: 2025-07-30
Payer: COMMERCIAL

## 2025-07-30 ENCOUNTER — TELEPHONE (OUTPATIENT)
Dept: NEUROLOGY | Facility: CLINIC | Age: 70
End: 2025-07-30
Payer: COMMERCIAL

## 2025-07-30 LAB
ANION GAP SERPL CALCULATED.3IONS-SCNC: 14 MMOL/L (ref 7–15)
BUN SERPL-MCNC: 55.1 MG/DL (ref 8–23)
CALCIUM SERPL-MCNC: 9 MG/DL (ref 8.8–10.4)
CHLORIDE SERPL-SCNC: 103 MMOL/L (ref 98–107)
CREAT SERPL-MCNC: 2.03 MG/DL (ref 0.51–0.95)
EGFRCR SERPLBLD CKD-EPI 2021: 26 ML/MIN/1.73M2
GLUCOSE BLDC GLUCOMTR-MCNC: 149 MG/DL (ref 70–99)
GLUCOSE BLDC GLUCOMTR-MCNC: 152 MG/DL (ref 70–99)
GLUCOSE BLDC GLUCOMTR-MCNC: 153 MG/DL (ref 70–99)
GLUCOSE BLDC GLUCOMTR-MCNC: 88 MG/DL (ref 70–99)
GLUCOSE BLDC GLUCOMTR-MCNC: 97 MG/DL (ref 70–99)
GLUCOSE SERPL-MCNC: 93 MG/DL (ref 70–99)
HCO3 SERPL-SCNC: 24 MMOL/L (ref 22–29)
HOLD SPECIMEN: NORMAL
INTERPRETATION SERPL IEP-IMP: NORMAL
LAB TEST RESULTS REPORTED IN RPTPERIOD: NORMAL
POTASSIUM SERPL-SCNC: 3.5 MMOL/L (ref 3.4–5.3)
SEQUENCING METHOD PNL BLD/T: NORMAL
SODIUM SERPL-SCNC: 141 MMOL/L (ref 135–145)
SPECIMEN TYPE: NORMAL

## 2025-07-30 PROCEDURE — 97535 SELF CARE MNGMENT TRAINING: CPT | Mod: GO

## 2025-07-30 PROCEDURE — 97750 PHYSICAL PERFORMANCE TEST: CPT | Mod: GP

## 2025-07-30 PROCEDURE — 250N000013 HC RX MED GY IP 250 OP 250 PS 637: Performed by: INTERNAL MEDICINE

## 2025-07-30 PROCEDURE — 250N000013 HC RX MED GY IP 250 OP 250 PS 637: Performed by: PHYSICIAN ASSISTANT

## 2025-07-30 PROCEDURE — 80048 BASIC METABOLIC PNL TOTAL CA: CPT | Performed by: PHYSICAL MEDICINE & REHABILITATION

## 2025-07-30 PROCEDURE — 128N000003 HC R&B REHAB

## 2025-07-30 PROCEDURE — 99232 SBSQ HOSP IP/OBS MODERATE 35: CPT | Performed by: INTERNAL MEDICINE

## 2025-07-30 PROCEDURE — 99232 SBSQ HOSP IP/OBS MODERATE 35: CPT | Performed by: PHYSICIAN ASSISTANT

## 2025-07-30 PROCEDURE — 97530 THERAPEUTIC ACTIVITIES: CPT | Mod: GP

## 2025-07-30 PROCEDURE — 36415 COLL VENOUS BLD VENIPUNCTURE: CPT | Performed by: PHYSICAL MEDICINE & REHABILITATION

## 2025-07-30 RX ORDER — HYDRALAZINE HYDROCHLORIDE 25 MG/1
25 TABLET, FILM COATED ORAL 3 TIMES DAILY
COMMUNITY
Start: 2025-07-30

## 2025-07-30 RX ORDER — CARVEDILOL 25 MG/1
25 TABLET ORAL 2 TIMES DAILY WITH MEALS
COMMUNITY
Start: 2025-07-30

## 2025-07-30 RX ORDER — AMOXICILLIN 250 MG
1 CAPSULE ORAL 2 TIMES DAILY PRN
COMMUNITY
Start: 2025-07-30

## 2025-07-30 RX ORDER — CLOPIDOGREL BISULFATE 75 MG/1
75 TABLET ORAL DAILY
Qty: 9 TABLET | Refills: 0 | Status: SHIPPED | OUTPATIENT
Start: 2025-08-01 | End: 2025-08-10

## 2025-07-30 RX ORDER — ATORVASTATIN CALCIUM 20 MG/1
20 TABLET, FILM COATED ORAL EVERY EVENING
Qty: 30 TABLET | Refills: 0 | Status: SHIPPED | OUTPATIENT
Start: 2025-07-30

## 2025-07-30 RX ORDER — NICOTINE POLACRILEX 4 MG
15-30 LOZENGE BUCCAL
Qty: 100 ML | Refills: 0 | Status: SHIPPED | OUTPATIENT
Start: 2025-07-30

## 2025-07-30 RX ADMIN — HYDRALAZINE HYDROCHLORIDE 75 MG: 50 TABLET ORAL at 13:44

## 2025-07-30 RX ADMIN — CARVEDILOL 37.5 MG: 25 TABLET, FILM COATED ORAL at 18:00

## 2025-07-30 RX ADMIN — FUROSEMIDE 40 MG: 40 TABLET ORAL at 09:22

## 2025-07-30 RX ADMIN — CLOPIDOGREL BISULFATE 75 MG: 75 TABLET, FILM COATED ORAL at 09:22

## 2025-07-30 RX ADMIN — ACETAMINOPHEN 650 MG: 325 TABLET ORAL at 13:46

## 2025-07-30 RX ADMIN — ASPIRIN 81 MG: 81 TABLET, DELAYED RELEASE ORAL at 09:22

## 2025-07-30 RX ADMIN — ATORVASTATIN CALCIUM 20 MG: 10 TABLET, FILM COATED ORAL at 20:11

## 2025-07-30 RX ADMIN — CARVEDILOL 37.5 MG: 25 TABLET, FILM COATED ORAL at 09:22

## 2025-07-30 RX ADMIN — HYDRALAZINE HYDROCHLORIDE 75 MG: 50 TABLET ORAL at 09:21

## 2025-07-30 RX ADMIN — Medication 50 MCG: at 09:22

## 2025-07-30 RX ADMIN — HYDRALAZINE HYDROCHLORIDE 75 MG: 50 TABLET ORAL at 20:11

## 2025-07-30 ASSESSMENT — ACTIVITIES OF DAILY LIVING (ADL)
ADLS_ACUITY_SCORE: 44
ADLS_ACUITY_SCORE: 45
ADLS_ACUITY_SCORE: 44
ADLS_ACUITY_SCORE: 43
ADLS_ACUITY_SCORE: 43
ADLS_ACUITY_SCORE: 44
ADLS_ACUITY_SCORE: 43
ADLS_ACUITY_SCORE: 43
ADLS_ACUITY_SCORE: 44
ADLS_ACUITY_SCORE: 44
ADLS_ACUITY_SCORE: 43
ADLS_ACUITY_SCORE: 44
ADLS_ACUITY_SCORE: 43
ADLS_ACUITY_SCORE: 45
ADLS_ACUITY_SCORE: 44
ADLS_ACUITY_SCORE: 44
ADLS_ACUITY_SCORE: 45
ADLS_ACUITY_SCORE: 43
ADLS_ACUITY_SCORE: 44

## 2025-07-30 NOTE — PROGRESS NOTES
[facility]   Acute Rehabilitation Unit  Daily progress note    INTERVAL HISTORY  Judi Moore was seen up in her room this morning.  No acute events reported overnight.  She reports that she is feeling well this morning overall but she is worried that a few things will not be ready at home for her anticipated discharge tomorrow.  She notes that family is working on grab bar installation and railing for stairs, but those may not be ready to next week.  She denies chest pain, shortness of breath, abdominal pain, nausea, dizziness, bowel or bladder concerns.  We reviewed lab results from this morning, as well as diabetes and BP management.  We discussed medications and follow-up appointments after discharge.  Denies other questions or concerns at this time.    With therapies, she is mod I with ADLs and in-room mobility with FWW>  She needs SBA for bathing tasks.  She can complete tub transfer with CGA and grab bar/FWW.      MEDICATIONS  Current Facility-Administered Medications   Medication Dose Route Frequency Provider Last Rate Last Admin    aspirin EC tablet 81 mg  81 mg Oral Daily Carol Hayes PA-C   81 mg at 07/29/25 0839    atorvastatin (LIPITOR) tablet 20 mg  20 mg Oral QPM Carol Hayes PA-C   20 mg at 07/29/25 2004    carvedilol (COREG) tablet 37.5 mg  37.5 mg Oral BID w/meals Parker Angeles MD   37.5 mg at 07/29/25 1800    clopidogrel (PLAVIX) tablet 75 mg  75 mg Oral Daily Carol Hayes PA-C   75 mg at 07/29/25 0839    furosemide (LASIX) tablet 40 mg  40 mg Oral Daily Carol Hayes PA-C   40 mg at 07/29/25 0839    hydrALAZINE (APRESOLINE) tablet 75 mg  75 mg Oral TID Parker Angeles MD   75 mg at 07/29/25 2004    insulin aspart (NovoLOG) injection (RAPID ACTING)   Subcutaneous TID w/meals Parker Angeles MD   5 Units at 07/29/25 1859    insulin aspart (NovoLOG) injection (RAPID ACTING)  1-10 Units Subcutaneous TID AC  "Carol Hayes PA-C   2 Units at 07/29/25 1800    insulin glargine (LANTUS PEN) injection 24 Units  24 Units Subcutaneous At Bedtime Parker Angeles MD   24 Units at 07/29/25 2204    [Held by provider] losartan (COZAAR) tablet 25 mg  25 mg Oral Daily Parker Angeles MD   25 mg at 07/29/25 0839    Vitamin D3 (CHOLECALCIFEROL) tablet 50 mcg  50 mcg Oral Daily Carol Hayes PA-C   50 mcg at 07/29/25 0839          Current Facility-Administered Medications   Medication Dose Route Frequency Provider Last Rate Last Admin    acetaminophen (TYLENOL) tablet 650 mg  650 mg Oral Q4H PRN Carol Hayes PA-C        bisacodyl (DULCOLAX) suppository 10 mg  10 mg Rectal Daily PRN Carol Hayes PA-C        glucose gel 15-30 g  15-30 g Oral Q15 Min PRN Carol Hayes PA-C        Or    dextrose 50 % injection 25-50 mL  25-50 mL Intravenous Q15 Min PRN Carol Hyaes PA-C        Or    glucagon injection 1 mg  1 mg Subcutaneous Q15 Min PRN Carol Hayes PA-C        hydrALAZINE (APRESOLINE) tablet 10 mg  10 mg Oral 4x Daily PRN Carol Hayes PA-C   10 mg at 07/23/25 2253    insulin aspart (NovoLOG) injection (RAPID ACTING)   Subcutaneous With Snacks or Supplements Clare Weston APRN CNP   3 Units at 07/23/25 2015    melatonin tablet 3 mg  3 mg Oral At Bedtime PRN Carol Hayes PA-C        polyethylene glycol (MIRALAX) Packet 17 g  17 g Oral Daily PRN Carol Hayes PA-C        senna-docusate (SENOKOT-S/PERICOLACE) 8.6-50 MG per tablet 1 tablet  1 tablet Oral BID PRN Carol Hayes PA-C            PHYSICAL EXAM  /52 (BP Location: Right arm)   Pulse 66   Temp 97.5  F (36.4  C) (Oral)   Resp 16   Ht 1.575 m (5' 2\")   Wt 83.6 kg (184 lb 4.9 oz)   SpO2 100%   BMI 33.71 kg/m    Gen: NAD, sitting up in chair  HEENT: NC/AT  Pulm: non-labored on room air  Abd: Non-distended  Ext: trace edema in BLE, no calf tenderness  Neuro/MSK: awake, alert, " face symmetric, speech clear/fluent, moving all extremities spontaneously    LABS  CBC RESULTS:   Recent Labs   Lab Test 07/28/25  0748 07/24/25  0606 07/23/25  0855   WBC 6.2 6.1 5.6   RBC 5.22* 5.01 5.15   HGB 15.4 14.7 15.5   HCT 46.9 44.4 46.4   MCV 90 89 90   MCH 29.5 29.3 30.1   MCHC 32.8 33.1 33.4   RDW 12.8 12.6 12.6   * 123* 128*       Last Basic Metabolic Panel:  Recent Labs   Lab Test 07/30/25  0833 07/30/25  0701 07/30/25  0217 07/29/25  0816 07/29/25  0558 07/28/25  1211 07/28/25  0748   NA  --  141  --   --  141  --  142   POTASSIUM  --  3.5  --   --  3.5  --  4.1   CHLORIDE  --  103  --   --  105  --  105   CO2  --  24  --   --  24  --  22   ANIONGAP  --  14  --   --  12  --  15   GLC 97 93 88   < > 112*   < > 127*   BUN  --  55.1*  --   --  53.2*  --  49.1*   CR  --  2.03*  --   --  2.07*  --  1.88*   GFRESTIMATED  --  26*  --   --  25*  --  28*   JURGEN  --  9.0  --   --  9.2  --  9.6    < > = values in this interval not displayed.        ASSESSMENT AND PLAN  Judi Moore is a 69 year old right hand dominant female with a past medical history of HTN, T2DM, HLD, CKD4, chronic lacunar infarcts of bilateral cerebellar hemispheres (on 5/2023 MRI), VANESSA, and OA s/p R TKA (2022) who was admitted on 7/19/25 with acute to subacute left internal capsule ischemic stroke with hospital course complicated by hyperglycemia, elevated BP, polycythemia, and thrombocytopenia.  She is now admitted to ARU on 07/23/25 for multidisciplinary rehabilitation and ongoing medical management.      Rehabilitation   Admission to acute inpatient rehab 07/23/25.    Impairment group code: Stroke Ischemic 01.2 (R) Body Involvement (L) Brain; Acute to subacute left internal capsule ischemic stroke, etiology likely small vessel disease vs ESUS         PT and OT 90 minutes of each daily for 6 days per week for 10 days, in addition to rehab nursing and close management of physiatrist.       Impairment of ADL's: Noted to have L  hemiplegia, facial weakness, impaired activity tolerance, impaired balance, impaired coordination, impaired sensation, and impaired weight shifting, all affecting her ability to safely and independently perform basic ADLs.  Goal for mod I with basic ADLs with exception of SBA for bathing.     Impairment of mobility:  Noted to have L hemiplegia, facial weakness, impaired activity tolerance, impaired balance, impaired coordination, impaired sensation, and impaired weight shifting, all affecting her ability to safely and independently perform basic mobility.  Goal for mod I with basic mobility with exception of SBA for stairs.    Continue comprehensive acute inpatient rehabilitation program with multidisciplinary approach including therapies, rehab nursing, and physiatry following. See interval history for updates.       Medical Conditions  New actions/orders/updates for today are in blue.     Acute to subacute left internal capsule ischemic stroke, etiology likely small vessel disease   History of prior cerebellar stroke (MRI 2023)  - SBP goal <130/80 long-term.  Management as below  - Continue DAPT with ASA 81 mg daily + Plavix 75 mg daily x21 days (last day 8/10) , followed by ASA 81 mg daily  - LDL goal 40-70, management as below  - Goal A1c<7, management as below  - Continue PT, OT, SLP  - Follow up with stroke [facility] in 6-8 weeks     Hypertension  PTA on Coreg 37.5 mg BID, Lasix 40 mg daily, hydralazine 75 mg TID, losartan 100 mg daily  Elevated BP on arrival, initially allowed permissive hypertension but home meds gradually resumed.  BP elevated on ARU admission, but also note that inpatient team may multiple dose adjustments just today, so make take some time to take effect.  - Hospitalist consulted for co-management, appreciate assistance  - SBP 120s-160s past several days.  Will monitor with Losartan being held as below.  - BP goal <180 for acute phase; long-term goal <130/80.   - Continue PTA Coreg 37.5 mg BID,  increased on 7/27  - Continue PTA Lasix 40 mg daily (resumed on 7/23)  - Continue PTA hydralazine 75 mg TID (increased on 7/25).    - Resumed PTA Losartan per IM on 7/28, initially at lower dose of 25 mg daily, however Cr 7/29 increased to 2.07 and has been placed on hold again (today will be first dose held).  - Hydralazine 10 mg QID PRN for SBP >180  - Monitor BP, adjust regimen as indicated in partnership with medicine team     Hyperlipidemia  PTA on atorvastatin 10 mg daily, increased this admission with LDL 80  - Continue atorvastatin 20 mg daily     Type II diabetes mellitus  PTA on Novolog sliding scale insulin TID AC, carb coverage (1:13 breakfast, 1:20 lunch, 1:7 dinner), and glargine 10 units at HS  A1c 7.7%  BG quite elevated this admission, frequently into the 300s.  Seems patient has been resistant to recommendations to increase long-acting insulin dose despite requiring very high doses of short-acting insulin, poorly controlled BG, education on 50:50 basal/bolus recommendations.  Was agreeable to small increase just prior to discharge to ARU and has been open to ongoing adjustments.  Endocrinology weighed in on 7/25 but did not feel consult indicated at that time, felt adjustments primary teams had been making were appropriate and patient responding well.  Did provide some general guidance.  See their note on 7/25 for additional details.  - Hospitalist consulted for co-management, appreciate assistance  - Monitor BG TID AC + HS + 0200.  BG  past 24h  - Continue Lantus 24 units at HS   - Continue Novolog high intensity sliding scale insulin TID AC.  Stopped HS sliding scale insulin given overnight lows.  - Transition to fixed meal dosing with goal to simplify regimen prior to discharge (with ongoing sliding scale insulin).  Will trial 5 units TID with meals starting with breakfast today.  - Moderate consistent carb diet  - Avoid metformin per endo given CKD  - Per endo, consider revisiting  discussion re: SGLT-2 again given her CV/renal profile; she has declined this in the past. Would need to be in context of risk/benefit ratio.   - Hypoglycemia protocol  - Diabetes education completed on 7/29     CKD IV  Secondary hyperparathyroidism   Follows with [facility].  Suspected 2/2 diabetic nephropathy +/- HTN nephrosclerosis.  Baseline Cr ~1.6-2.0.  During this admission, stable in 1.6-1.8 range.  However increased to 2.07 on 7/29, slightly above suspected baseline.  In setting of restarting Losartan, which was held (did receive dose on 7/29)  7/30: Cr stable 2.03, close to suspected baseline  - Avoid/limit nephrotoxins as able  - Holding Losartan as above  - Re-check BMP per IM recs     VANESSA  Does not tolerate CPAP  - Nocturnal hypoxia may contribute to vascular risk--consider alternate therapy in follow-up.      Polycythemia  Incidental finding.  Hgb 17.0 on admission.  Evaluated by hematology during this admission and additional labs were sent.  EPO WNL.  Iron, ferritin, sat index WNL; IBC low.  Peripheral smear unremarkable for any abnormal cells.  Hgb nearly WNL by discharge to ARU, sending team notes possible dehydration at admission contributing.  - JAK2 mutation labs pending.  Per hematology, results likely to take several weeks.  If positive, they will start on hydroxyurea for polycythemia vera  7/28: Hgb WNL 15.4  - Follow up with hematology      Thrombocytopenia  Platelets low throughout admission, stable at 128 on 7/23 7/28: platelets stable/mildly low at 146    Diabetic retinopathy  Macular edema  Follows with [facility], Dr. Ojeda.  Gets injections q8-10 weeks per patient report, missed scheduled injections on 7/22 due to this admission.  - Per patient request, reached out to her ophthalmology team to determine how long she can safely delay these injections and/or whether there is a need to wait a certain period of time post-stroke.  Worked with their team to  reschedule for 8/11, they noted ok to delay until that time.  Info added to her AVS       Adjustment to disability:  Clinical psychology to eval and treat if indicated  FEN: mod consistent CHO diet, regular texture, thin liquids  Bowel: continent with some functional constipation.  Has PRN bowel meds available.  Monitor and adjust regimen as indicated.  Bladder: mixed continence.  PVRs at admission without evidence of retention  DVT Prophylaxis: ambulation/mechanical  GI Prophylaxis: none, consider adding PPI given age + DAPT   Code: full; confirmed on admission  Disposition: home with family assist, home care RN/PT/OT  ELOS: tentative discharge date 7/31.  Patient indicates grab bars and railing not yet installed.  Therapies will follow-up with patient and spouse but indicated this is not likely to be a barrier to discharge.  Spouse has not been present the past 2 days for planned family training, hopeful he will attend today.  Follow up Appointments on Discharge: PCP in 1-2 weeks, neurology with stroke [facility] in 6-8 weeks, hematology, PM&R in 8-12 weeks, ophthalmology (scheduled 8/11)      35 minutes spent on the date of service doing chart review, history and exam, documentation, and further activities as noted above.     Plan of care was discussed with Dr. Aubrey Ro, PM&R staff physician     Carol Hayes PA-C  Physical Medicine & Rehabilitation

## 2025-07-30 NOTE — PLAN OF CARE
Goal Outcome Evaluation:      Plan of Care Reviewed With: patient    Overall Patient Progress: improvingOverall Patient Progress: improving    Orientation: alert and oriented x4  VS: stable  Pain: c/o chronic neck and lower back pain, prn tylenol 650mg given x1  Ambulation/ Transfers: Mod I with FWW  Bowel: continent, LBM 7/29 per pt  Bladder: continent  Diet/ Liquids: moderate consistent carb, regular/ thin, taking pills whole  Blood Sugars: 97, 153  Tubes/ Lines/ Drains: none  Skin: bruise on abdomen  Pt participated in therapies and possible discharge home tomorrow.

## 2025-07-30 NOTE — PROGRESS NOTES
Discharge Planner Post-Acute Rehab OT:     Discharge Plan: Mod I ADL at home with spouse, HC    Precautions: fall, HTN (SBP <180)    Current Status:  ADLs:  Mobility: Mod I FWW and shoes/ socks  Grooming: Mod I FWW  Dressing: Mod I FWW; sit in chair. IND with slip on shoes; A for socks.  Bathing: Tub transfer CGA <> ETB with grab bar/FWW to simulate home. S/U for bathing tasks.  Toileting: Mod I FWW and grab bar; items in reach.  IADLs: Baseline IND med mgmt and cooking; shopping on [employer]; spouse typically drives but using [employer] more recently d/t reduced mobility. . Completed stovetop meal prep supervision FWW at [facility]; good safety for stovetop mgmt.   Vision/Cognition: WFL, injections baseline for retina health. Mild memory deficit baseline per dtr. Mild R inattention, increased memory deficit noted. Recommend formal assessment and monitor of functional performance. M-ACE completed 7/26, scored 23/30 with deficits in memory.    Assessment: IND day shower, Ggs updated. Pt made good progress toward OT goals. Appropriate for discharge to home environment with support.    Other Barriers to Discharge (DME, Family Training, etc):   Level of physical A.    Social support: Lives with spouse (able to provide cognitive but not physical A); daughter Brandi lives locally but works/childcare and unable to provide daily assistance. Daughter Jasmyn lives in [place].    DME: Owns cane, FWW, shower chair and ETB. Recommend grab bars in either shower spaces, and grab bar at toilet.    Home environment: Able to live on main level with tub/shower BR; typically showers basement level WIS.

## 2025-07-30 NOTE — TELEPHONE ENCOUNTER
Gabriella to schedule w/ fellow.      Steffi Eller BS, RN, SCRN  Stroke/Neurovascular Clinic RN  [facility] Neuroscience Service Line

## 2025-07-30 NOTE — PROGRESS NOTES
"                                                           ARU Social Work Progress Notes        met with patient and completed IMM and IRF. IMM signed and placed in physical chart. Home Care Information is reviewed with patient.  provided patient with ride time. Spouse plans on being on ARU by 10:00 am.       IRF-SANDRA Pain Assessment    Pain Effect on Sleep  \"Over the past 5 days, how much of the time has pain made it hard for you to sleep at night?\"    0. Does not apply - I have not had any pain or hurting in the past 5 days     Pain Interference with Therapy Activities  \"Over the past 5 days, how often have you limited your participation in rehabilitation therapy sessions due to pain?\"   0. Does not apply - I have not had any pain or hurting in the past 5 days        Home Health Care:  [facility]  PH: [phone]  Nurse, physical therapy and occupational therapy  -You will get a call after you have discharged from [facility] to schedule the first home care visit. The home health nurse should come out within the first 24-48 hours. If you don't get a call from them within the first 48 hours, please call to follow up.     TRACY Olivares  [facility], Acute Inpatient Rehab Unit   [facility] [address] [place] [address]  Phone: [phone]  Fax: [phone]    "

## 2025-07-30 NOTE — PROGRESS NOTES
Hematology referral reviewed for [facility], see below.    Referral reason: referral received from consulting Hematology provider for polycythemia, labs still in process but patient is now discharged to ARU, will move forward for scheduling, per Dr Stewart, would benefit from at least one Hematology visit    Clinical question entered by referring provider or through order transcription: Polycythemia    Referral received via: Internal referral by [facility]On[facility]    Current abnormal labs: Available in Chart Review    Plan: Triage instructions updated and sent to NPS for completion.

## 2025-07-30 NOTE — TELEPHONE ENCOUNTER
This encounter is being sent to inform the clinic that this patient has a referral from   Pamela Edward PA-C  for the diagnoses of CVA and has requested that this patient be seen within [facility] and/or with [facility] / .  Based on the availability of our provider(s), we are unable to accommodate this request.    Were all sites offered this patient?  Yes    Does scheduling algorithm request to schedule next available?  Patient appointment has not been scheduled.  Please review the referral request for accommodation and contact the patient.  If unable to accommodate, please resubmit a referral and indicate a preferred partner or affiliate location using Provider Finder or Scheduling Instructions field.     Please review, referral does not have date/ timeframe listed for hospital follow up, all appointments with [facility] and general is booked out to October. Patient will be discharged tomorrow.

## 2025-07-30 NOTE — PLAN OF CARE
Goal Outcome Evaluation:      Plan of Care Reviewed With: patient    Overall Patient Progress: no changeOverall Patient Progress: no change     Patient on bed with eyes closed when checked at the start of the shift, appeared to be comfortable on bed, no distress noted  Slept most of the night  No complained of pain, no respiratory distress  Continent of B&B, ambulates with FWW in the bathroom, no reported BM this shift  Modified Independent with FWW in the room  No acute event overnight  Plan of care ongoing.

## 2025-07-30 NOTE — PROGRESS NOTES
[facility]    Medicine Progress Note - Hospitalist Service    Date of Admission:  7/23/2025      Today's updates 7/30/25  -No acute events overnight.   -BP stable this morning. Continue holding Losartan.   -Cr 2.03 today.   -Other ROS negative.   -Neurologic examination stable.       Assessment & Plan     Judi Moore is a 69 year old female admitted on 7/23/2025. She A 69-year-old female with a complex medical history, including type 2 diabetes, hypertension, dyslipidemia, obstructive sleep apnea, CKD stage IV, and a prior cerebellar stroke, was admitted with a 1-day history of right upper extremity weakness, ataxia, and difficulty ambulating. These symptoms occurred in the context of persistent severe hypertension at home, with systolic blood pressures exceeding 200 mmHg despite medication compliance.    Upon admission, a CT scan of the head showed no acute intracranial process. However, an MRI of the brain on 7/20 revealed a small acute to early subacute infarct in the left internal capsule, likely secondary to small vessel disease, with chronic ischemic changes. An MRA of the head was normal, while the MRA of the neck showed 60-70% narrowing of the right internal carotid artery. Echocardiography indicated a normal ejection fraction with mild to moderate concentric left ventricular hypertrophy and no significant valvular pathology. She was loaded with Plavix and started on a regimen of Plavix 5 mg daily, in addition to continuing aspirin 81 mg daily. Dual antiplatelet therapy is planned for at least three weeks.     The patient was initially managed with permissive hypertension, with a blood pressure goal of less than 180 mmHg during the acute phase, transitioning to a long-term goal of less than 130/80 mmHg. Her atorvastatin dose was increased from 10 mg to 20 mg daily. A neurology follow-up is scheduled in 6-8 weeks.    Patient was admitted to acute rehab on  July 23, 2025 for ongoing rehabilitation.  Internal medicine is following for medical comanagement and management of blood pressure.    # Acute to Subacute Left Internal Capsule Ischemic Stroke     The stroke is likely secondary to small vessel disease. The patient was started on dual antiplatelet therapy with Plavix and aspirin, which will continue for at least three weeks.   - Blood pressure long-term goal of less than 130/80 mmHg.   - Neurology follow-up is scheduled in 6-8 weeks.  - Therapy per PM&R team  - BP stable today, continue monitoring.     # Hypertension  BP labile 120s-190s/ 60s  HR 60s  Patient's PTA medications include Coreg 37.5 mg twice daily, furosemide 40 mg daily, hydralazine 25 mg 3 times daily, losartan 100 mg daily.  Prior to transfer to the hospital, per discussion with hospitalist at [facility], patient should be at her long-term goal blood pressure treatment.  Carvedilol 37.5 mg bid  Hydralazine 75 mg bid  Furosemide 40 mg daily  Continue holding Losartan  Continue monitoring renal function and lytes      # Polycythemia     The elevated hemoglobin and hematocrit were likely due to dehydration, as they normalized with IV fluids. A JAK2 mutation test is pending, and the patient will follow up with hematology and GI for further evaluation. Next-generation sequencing results are expected in 2-3 weeks.    # Type 2 Diabetes Mellitus      The patient's blood sugar levels are elevated, with an A1c of 7.7%. Unorthodox insulin regimen PTA with only Lantus 10 units daily, high aspart mealtime dose  Has never taken metformin  Lantus has been increased to 22 units daily  Currently receiving aspart per sliding scale insulin and carb counting  BG remain elevated but sl low early am on 7/28  Continue on Lantus to 24 units daily  Continue carb counting ( 1 unit per 8 gram) + sliding scale insulin coverage--may need more intense coverage  Patient does not wish to start metformin.   notes some  uncertainty regarding carb counting; it ultimately be preferable to start fixed dose insulin in addition to sliding scale with meals    Recent Labs   Lab 07/30/25  1110 07/30/25  0833 07/30/25  0701 07/30/25  0217 07/29/25  2148 07/29/25  1734   * 97 93 88 192* 170*       # Dyslipidemia     Atorvastatin was increased to 20 mg daily, with a long-term LDL goal of less than 70. The current LDL is 80.    # Obstructive Sleep Apnea      The patient is CPAP-intolerant, and alternate therapies will be considered in follow-up to address nocturnal hypoxia and reduce vascular risk.    # Chronic Kidney Disease Stage IV with Secondary Hyperparathyroidism  - Cr 2.07.   - Continue monitoring.   - Avoid nephrotoxic medications         Diet: Combination Diet Regular Diet; Moderate Consistent Carb (60 g CHO per Meal) Diet; Thin Liquids (level 0)  Room Service  Snacks/Supplements Adult: Glucerna; With Meals    DVT Prophylaxis: Enoxaparin (Lovenox) SQ  Daniel Catheter: Not present  Lines: None     Cardiac Monitoring: None  Code Status: Full Code           Junior Mauricio MD  Hospitalist Service  [facility]  Securely message with [employer] (more info)  Text page via [employer] Paging/Directory   ______________________________________________________________________    Interval History     Acute events as above  Pleasant   Other ROS negative     Physical Exam   Vital Signs: Temp: 97.5  F (36.4  C) Temp src: Oral BP: 138/52 Pulse: 66   Resp: 16 SpO2: 100 % O2 Device: None (Room air)    Weight: 184 lbs 4.87 oz  Alert, fully oriented  Lungs clear  CV rrr  Abd soft, + BS, no tenderness to palpation   Alert, oriented, speech clear, no facial droop, moving all extremities       Data   Recent Labs   Lab 07/30/25  1110 07/30/25  0833 07/30/25  0701 07/29/25  0816 07/29/25  0558 07/28/25  1211 07/28/25  0748 07/24/25  0751 07/24/25  0606   WBC  --   --   --   --   --   --  6.2  --  6.1   HGB  --    --   --   --   --   --  15.4  --  14.7   MCV  --   --   --   --   --   --  90  --  89   PLT  --   --   --   --   --   --  146*  --  123*   NA  --   --  141  --  141  --  142  --  138   POTASSIUM  --   --  3.5  --  3.5  --  4.1  --  3.7   CHLORIDE  --   --  103  --  105  --  105  --  103   CO2  --   --  24  --  24  --  22  --  21*   BUN  --   --  55.1*  --  53.2*  --  49.1*  --  45.4*   CR  --   --  2.03*  --  2.07*  --  1.88*  --  1.89*   ANIONGAP  --   --  14  --  12  --  15  --  14   JURGEN  --   --  9.0  --  9.2  --  9.6  --  9.2   * 97 93   < > 112*   < > 127*   < > 306*    < > = values in this interval not displayed.

## 2025-07-30 NOTE — PROGRESS NOTES
"Physical Therapy Discharge Summary    Reason for therapy discharge:    Discharged to home with home therapy.    Progress towards therapy goal(s). See goals on Care Plan in [facility] electronic health record for goal details.  Goals met    Therapy recommendation(s):    Continued therapy is recommended.  Rationale/Recommendations:  HH PT to continue to progress functional strength, endurance and mobility within the home setting. Pt progressed to mod-I in room with FWW while on unit. Has FWW and SEC for home use, anticipate quick progression to SEC.    Bed Mobility: IND  Transfer: Mod-I FWW  Gait: Mod-I 250' FWW  Stairs: mod-I with rail - SBA w/ \"ledge\" and cane  Balance: Fair ambulate to ambulate without device, however will functionally use FWW.     Outcome Measures:   TU/24: 36.7 sec with FWW     Michaels/56 on    39/56 on      10MWT:  .43 m/s with FWW on       "

## 2025-07-30 NOTE — PROGRESS NOTES
07/30/25 1400   Signing Clinician's Name / Credentials   Signing clinician's name / credentials Jayro Martelberta DPT   Michaels Balance Scale (BERTA JOLLEY, DOUG S, PAPI BAUM, HARVEY MCCANN: MEASURING BALANCE IN THE ELDERLY: VALIDATION OF AN INSTRUMENT. CAN. J. PUB. HEALTH, JULY/AUGUST SUPPLEMENT 2:S7-11, 1992.)   Sit To Stand 4   Standing Unsupported 4   Sitting Unsupported 4   Stand to Sit 3   Transfers 3   Standing with Eyes Closed 4   Standing Unsupported, Feet Together 0   Reach Forward With Outstretched Arm 3   Retrieve Object From Floor 4   Turning to Look Behind 3   Turn 360 Degrees 2   Placing Alternate Foot on Stool (4-6 inches) 1   Unsupported Tandem Stand (Demonstrate to Subject) 3   One Leg Stand 1   Total Score (A score of 45 or less has been correlated with an increased risk of falls)   Total Score (out of 56) 39     Michaels Balance Scale (BBS) Cutoff Scores for CVA Population:    The BBS is a measure of static and dynamic standing balance that has been validated in community dwelling elderly individuals and individuals who have Parkinson's Disease, MS, and those who are s/p CVA and TBI. The test is administered without an assistive device. Scores from the Michaels are used to determine the probability of falling based on the patient's previous history of falls and their test performance.     0-20 High risk for falling- Corresponded with w/c bound status  21-40 Medium risk for falling- Able to walk with assistance  41-56 Low risk for falling- Able to walk independently  According to [facility].  Available at http://www.strokecenter.org/.  Accessibility verified April 10, 2013.  Minimal Detectable Change = 6.5 according to Pato & Seney 2008    Assessment (rationale for performing, application to patient s function & care plan): Improved from 25 earlier in admission. Continue to recommend walker use at discharge.  (Minutes billed as physical performance test)

## 2025-07-30 NOTE — PLAN OF CARE
No acute changes, A/O x4, continent of B/B and can make her needs known.

## 2025-07-31 VITALS
HEART RATE: 69 BPM | RESPIRATION RATE: 16 BRPM | SYSTOLIC BLOOD PRESSURE: 135 MMHG | OXYGEN SATURATION: 96 % | BODY MASS INDEX: 33.92 KG/M2 | DIASTOLIC BLOOD PRESSURE: 57 MMHG | TEMPERATURE: 97.7 F | WEIGHT: 184.3 LBS | HEIGHT: 62 IN

## 2025-07-31 LAB
CREAT SERPL-MCNC: 2.11 MG/DL (ref 0.51–0.95)
EGFRCR SERPLBLD CKD-EPI 2021: 25 ML/MIN/1.73M2
GLUCOSE BLDC GLUCOMTR-MCNC: 118 MG/DL (ref 70–99)
GLUCOSE BLDC GLUCOMTR-MCNC: 128 MG/DL (ref 70–99)
GLUCOSE BLDC GLUCOMTR-MCNC: 160 MG/DL (ref 70–99)
MCV RBC AUTO: 90 FL (ref 78–100)
PLATELET # BLD AUTO: 129 10E3/UL (ref 150–450)

## 2025-07-31 PROCEDURE — 36415 COLL VENOUS BLD VENIPUNCTURE: CPT | Performed by: PHYSICAL MEDICINE & REHABILITATION

## 2025-07-31 PROCEDURE — 250N000013 HC RX MED GY IP 250 OP 250 PS 637: Performed by: PHYSICIAN ASSISTANT

## 2025-07-31 PROCEDURE — 82565 ASSAY OF CREATININE: CPT | Performed by: PHYSICAL MEDICINE & REHABILITATION

## 2025-07-31 PROCEDURE — 99239 HOSP IP/OBS DSCHRG MGMT >30: CPT | Performed by: PHYSICIAN ASSISTANT

## 2025-07-31 PROCEDURE — 85049 AUTOMATED PLATELET COUNT: CPT | Performed by: PHYSICAL MEDICINE & REHABILITATION

## 2025-07-31 PROCEDURE — 250N000013 HC RX MED GY IP 250 OP 250 PS 637: Performed by: INTERNAL MEDICINE

## 2025-07-31 RX ADMIN — Medication 50 MCG: at 07:56

## 2025-07-31 RX ADMIN — FUROSEMIDE 40 MG: 40 TABLET ORAL at 07:55

## 2025-07-31 RX ADMIN — CARVEDILOL 37.5 MG: 25 TABLET, FILM COATED ORAL at 07:55

## 2025-07-31 RX ADMIN — HYDRALAZINE HYDROCHLORIDE 75 MG: 50 TABLET ORAL at 07:56

## 2025-07-31 RX ADMIN — ASPIRIN 81 MG: 81 TABLET, DELAYED RELEASE ORAL at 07:55

## 2025-07-31 RX ADMIN — CLOPIDOGREL BISULFATE 75 MG: 75 TABLET, FILM COATED ORAL at 07:56

## 2025-07-31 ASSESSMENT — ACTIVITIES OF DAILY LIVING (ADL)
ADLS_ACUITY_SCORE: 45

## 2025-07-31 NOTE — TELEPHONE ENCOUNTER
Spoke with patient and , only openings with any stroke provider are at [facility] and they do not want to travel to [place]. They are keeping appointment 10/10 with RAY in [place]

## 2025-07-31 NOTE — DISCHARGE SUMMARY
[facility]   Acute Rehabilitation Unit  Discharge summary     Date of Admission: 7/23/2025  Date of Discharge: 7/31/2025  Disposition: home with family assist, home care   Primary Care Physician: Abelardo Pickard  Attending physician: Pepper Ro MD      DISCHARGE DIAGNOSIS    Acute to subacute left internal capsule ischemic stroke, etiology likely small vessel disease    History of prior cerebellar stroke (MRI 2023)   Hypertension   Hyperlipidemia  Type II diabetes mellitus  CKD IV  VANESSA  Polycythemia  Thrombocytopenia   Hx diabetic retinopathy       BRIEF SUMMARY  Judi Moore is a 69 year old right hand dominant female with past medical history of HTN, T2DM, HLD, CKD4, chronic lacunar infarcts of bilateral cerebellar hemispheres (on 5/2023 MRI), VANESSA, and OA s/p R TKA (2022) who presented on 7/19/25 with several day history of dizziness, ataxia, right-sided weakness, and elevated BP (despite compliance with medications).  CT scan of the head did not show any acute intracranial pathology.  CTA was deferred given impaired kidney function.  She was not a candidate for tenecteplase or thrombectomy given symptoms starting multiple days prior to admission.  MRI brain showed acute to subacute left internal capsule ischemic stroke. MRA head/neck with no large vessel occlusion, 50 to 60%, right ICA stenosis.      Additional stroke evaluation with echo with EF 55-60%, mild to moderate LVH; EKG with sinus rhythm; LDL 80; A1c 7.7%.  Stroke etiology felt to be small vessel disease.  Dual antiplatelet therapy with aspirin and Plavix was recommended for 21 days, followed by aspirin 81 mg daily ongoing.  Her prior to admission statin dose was increased.  Her anti-hypertensive medications were gradually resumed and insulin was adjusted.  However, she continued to have elevated blood pressure and blood glucose readings even upon transfer to ARU.     Hospital course was further  "complicated by hyperglycemia, elevated BP, polycythemia (hematology consulted), and thrombocytopenia.     During acute hospitalization, patient was seen and evaluated by PT and OT, who collectively recommended that patient would benefit from ongoing therapies in the acute inpatient rehabilitation setting.     She was subsequently admitted to ARU on 25 for multidisciplinary rehabilitation and ongoing medical management.  ARU course was complicated by need for ongoing adjustments to her anti-hypertensive and diabetes regimens, as well as rise in creatinine.  Medicine team assisted for co-management of these issues.  BP and blood glucose were significantly better controlled by time of discharge.  She progressed to modified independence with transfers, ambulation, ADLs, and some basic IADLs with FWW.  She will need SBA for current stair set-up and would benefit from supervision/assist for higher level IADLs.  She is discharging home with family support and home care.    REHABILITATION COURSE  PT: Discharged to home with home therapy.     Progress towards therapy goal(s). See goals on Care Plan in [facility] electronic health record for goal details.  Goals met     Therapy recommendation(s):    Continued therapy is recommended.  Rationale/Recommendations:   PT to continue to progress functional strength, endurance and mobility within the home setting. Pt progressed to mod-I in room with FWW while on unit. Has FWW and SEC for home use, anticipate quick progression to SEC.     Bed Mobility: IND  Transfer: Mod-I FWW  Gait: Mod-I 250' FWW  Stairs: mod-I with rail - SBA w/ \"ledge\" and cane  Balance: Fair ambulate to ambulate without device, however will functionally use FWW.     Outcome Measures:   TU/24: 36.7 sec with FWW     Michaels/56 on               39/56 on      10MWT:  .43 m/s with FWW on     OT: Discharged to home with home therapy.    Current Status:  ADLs:  Mobility: Mod I FWW and " shoes/ socks  Grooming: Mod I FWW  Dressing: Mod I FWW; sit in chair. IND with slip on shoes; A for socks.  Bathing: Tub transfer CGA <> ETB with grab bar/FWW to simulate home. S/U for bathing tasks.  Toileting: Mod I FWW and grab bar; items in reach.  IADLs: Baseline IND med mgmt and cooking; shopping on [employer]; spouse typically drives but using [employer] more recently d/t reduced mobility. . Completed stovetop meal prep supervision FWW at ARU; good safety for stovetop mgmt.   Vision/Cognition: WFL, injections baseline for retina health. Mild memory deficit baseline per dtr. Mild R inattention, increased memory deficit noted. Recommend formal assessment and monitor of functional performance. M-ACE completed 7/26, scored 23/30 with deficits in memory.      MEDICAL COURSE  Acute to subacute left internal capsule ischemic stroke, etiology likely small vessel disease   History of prior cerebellar stroke (MRI 2023)  - SBP goal <130/80 long-term.  Management as below  - Continue DAPT with ASA 81 mg daily + Plavix 75 mg daily x21 days (last day 8/10) , followed by ASA 81 mg daily  - LDL goal 40-70, management as below  - Goal A1c<7, management as below  - Continue PT, OT, SLP  - Follow up with stroke RAY in 6-8 weeks     Hypertension  PTA on Coreg 37.5 mg BID, Lasix 40 mg daily, hydralazine 75 mg TID, losartan 100 mg daily  Elevated BP on arrival, initially allowed permissive hypertension but home meds gradually resumed.  BP elevated on ARU admission, but also note that inpatient team may multiple dose adjustments just today, so make take some time to take effect.  Meds gradually titrated back to home doses at ARU.  Attempted to resume Losartan but subsequently held due to rise in Cr.   - Hospitalist consulted for co-management, appreciate assistance  - SBP 120s-150s past several days  - BP goal <180 for acute phase; long-term goal <130/80.   - Continue PTA Coreg 37.5 mg BID, increased on 7/27  - Continue PTA Lasix 40  mg daily (resumed on 7/23)  - Continue PTA hydralazine 75 mg TID (increased on 7/25).    - Resumed PTA Losartan per IM on 7/28, initially at lower dose of 25 mg daily, however Cr 7/29 increased to 2.07 and has been placed on hold again  - Monitor BP at home.  Follow up with PCP for ongoing monitoring and medication adjustments     Hyperlipidemia  PTA on atorvastatin 10 mg daily, increased this admission with LDL 80  - Continue atorvastatin 20 mg daily     Type II diabetes mellitus  PTA on Novolog sliding scale insulin TID AC, carb coverage (1:13 breakfast, 1:20 lunch, 1:7 dinner), and glargine 10 units at HS  A1c 7.7%  BG quite elevated this admission, frequently into the 300s.  Seems patient has been resistant to recommendations to increase long-acting insulin dose despite requiring very high doses of short-acting insulin, poorly controlled BG, education on 50:50 basal/bolus recommendations.  Was agreeable to small increase just prior to discharge to ARU and has been open to ongoing adjustments.  Endocrinology weighed in on 7/25 but did not feel consult indicated at that time, felt adjustments primary teams had been making were appropriate and patient responding well.  Did provide some general guidance.  See their note on 7/25 for additional details.  - Monitor BG TID AC + HS  - Continue Lantus 24 units at HS   - Continue Novolog 5 units TID with meals  - Continue Novolog high intensity sliding scale insulin TID AC.  Stopped HS sliding scale insulin given overnight lows.  - Moderate consistent carb diet  - Avoid metformin per endo given CKD  - Per endo, consider revisiting discussion re: SGLT-2 again given her CV/renal profile; she has declined this in the past. Would need to be in context of risk/benefit ratio.   - Hypoglycemia protocol  - Diabetes education completed on 7/29  - Follow up with PCP for ongoing management     CKD IV  Secondary hyperparathyroidism   Follows with [facility].  Suspected  2/2 diabetic nephropathy +/- HTN nephrosclerosis.  Baseline Cr ~1.6-2.0.  During this admission, stable in 1.6-1.8 range.  However increased to 2.07 on 7/29, slightly above suspected baseline.  In setting of restarting Losartan, which was held (did receive dose on 7/29)  7/31: Cr up to 2.11, slightly above baseline  - Encouraged hydration  - Avoid/limit nephrotoxins as able  - Continue holding Losartan  - Re-check BMP in 5-7 days  - Follow up with PCP and nephrology for ongoing monitoring     VANESSA  Does not tolerate CPAP  - Nocturnal hypoxia may contribute to vascular risk--consider alternate therapy in follow-up.      Polycythemia  Incidental finding.  Hgb 17.0 on admission.  Evaluated by hematology during this admission and additional labs were sent.  EPO WNL.  Iron, ferritin, sat index WNL; IBC low.  Peripheral smear unremarkable for any abnormal cells.  Hgb nearly WNL by discharge to ARU, sending team notes possible dehydration at admission contributing.  - JAK2 mutation labs pending.  Per hematology, results likely to take several weeks.  If positive, they will start on hydroxyurea for polycythemia vera  7/28: Hgb WNL 15.4  - Follow up with hematology      Thrombocytopenia  Platelets low throughout admission.  Stable at 129 on 7/31.     Diabetic retinopathy  Macular edema  Follows with [facility], Dr. Ojeda.  Gets injections q8-10 weeks per patient report, missed scheduled injections on 7/22 due to this admission.  - Per patient request, reached out to her ophthalmology team to determine how long she can safely delay these injections and/or whether there is a need to wait a certain period of time post-stroke.  Worked with their team to reschedule for 8/11, they noted ok to delay until that time.  Info added to her AVS    DISCHARGE MEDICATIONS  Current Discharge Medication List        START taking these medications    Details   glucose 40 % (400 mg/mL) gel Take 15-30 g by mouth every 15 minutes as  needed for low blood sugar.  Qty: 100 mL, Refills: 0    Associated Diagnoses: Type 2 diabetes mellitus with hyperglycemia, with long-term current use of insulin (H)      insulin glargine (LANTUS PEN) 100 UNIT/ML pen Inject 24 Units subcutaneously at bedtime.    Comments: If Lantus is not covered by insurance, may substitute Basaglar or Semglee or other insulin glargine product per insurance preference at same dose and frequency.    Associated Diagnoses: Type 2 diabetes mellitus with hyperglycemia, with long-term current use of insulin (H)      senna-docusate (SENOKOT-S/PERICOLACE) 8.6-50 MG tablet Take 1 tablet by mouth 2 times daily as needed for constipation.    Associated Diagnoses: Cerebrovascular accident (CVA), unspecified mechanism (H)           CONTINUE these medications which have CHANGED    Details   atorvastatin (LIPITOR) 20 MG tablet Take 1 tablet (20 mg) by mouth every evening.  Qty: 30 tablet, Refills: 0    Associated Diagnoses: Cerebrovascular accident (CVA), unspecified mechanism (H)      !! carvedilol (COREG) 25 MG tablet Take 1 tablet (25 mg) by mouth 2 times daily (with meals). Take with carvedilol 12.5 mg for total of 37.5 mg 2 times daily.      clopidogrel (PLAVIX) 75 MG tablet Take 1 tablet (75 mg) by mouth daily for 9 days.  Qty: 9 tablet, Refills: 0    Associated Diagnoses: Cerebrovascular accident (CVA), unspecified mechanism (H)      !! hydrALAZINE (APRESOLINE) 25 MG tablet Take 1 tablet (25 mg) by mouth 3 times daily. Take with 50 mg tablet for total of 75 mg 3 times daily.      !! insulin aspart (NOVOLOG PEN) 100 UNIT/ML pen Inject 1-10 Units subcutaneously 3 times daily (before meals). Correction Scale - HIGH INSULIN RESISTANCE DOSING  Check BG 3 times daily before meals and add the following units to your fixed meal dose. Do Not give Correction Insulin if Pre-Meal BG less than 140. For Pre-Meal  - 164 give 1 unit. For Pre-Meal  - 189 give 2 units. For Pre-Meal  - 214  give 3 units. For Pre-Meal  - 239 give 4 units. For Pre-Meal  - 264 give 5 units. For Pre-Meal  - 289 give 6 units. For Pre-Meal  - 314 give 7 units. For Pre-Meal  - 339 give 8 units. For Pre-Meal  - 364 give 9 units. For Pre-Meal BG greater than or equal to 365 give 10 units. If NOT eating a meal: Check BG and administer correction (sliding scale) within 30 minutes (but no fixed meal dose).  If eating a meal: To be given with fixed meal dose, and based on pre-meal BG. Ideally give within 5 minutes of start of meal and no more than 30 minutes after start of meal.    Associated Diagnoses: Type 2 diabetes mellitus with hyperglycemia, with long-term current use of insulin (H)      !! insulin aspart (NOVOLOG PEN) 100 UNIT/ML pen Inject 5 Units subcutaneously 3 times daily (with meals).    Associated Diagnoses: Type 2 diabetes mellitus with hyperglycemia, with long-term current use of insulin (H)       !! - Potential duplicate medications found. Please discuss with provider.        CONTINUE these medications which have NOT CHANGED    Details   aspirin 81 MG EC tablet Take 1 tablet (81 mg) by mouth daily  Refills: 0    Associated Diagnoses: Type 2 diabetes mellitus with other specified complication, with long-term current use of insulin (H)      !! carvedilol (COREG) 12.5 MG tablet Take 12.5 mg by mouth 2 times daily (with meals). Take with carvedilol 25 mg BID for a total of 37.5 mg BID.      furosemide (LASIX) 40 MG tablet Take 40 mg by mouth daily      !! hydrALAZINE (APRESOLINE) 50 MG tablet Take 50 mg by mouth 3 times daily. Take with hydralazine 25 mg TID for a total of 75 mg TID.      acetaminophen (TYLENOL) 325 MG tablet Take 2 tablets (650 mg) by mouth every 4 hours as needed for other (For optimal non-opioid multimodal pain management to improve pain control.)  Refills: 0    Associated Diagnoses: Primary osteoarthritis of right knee      losartan (COZAAR) 100 MG tablet Take  100 mg by mouth daily      vitamin D3 (VITAMIN D3) 50 mcg (2000 units) tablet Take 1 tablet by mouth daily        !! - Potential duplicate medications found. Please discuss with provider.        STOP taking these medications       insulin glargine 100 UNIT/ML pen Comments:   Reason for Stopping:                 DISCHARGE INSTRUCTIONS AND FOLLOW UP  Discharge Procedure Orders   Home Care Referral   Referral Priority: Routine: Next available opening Referral Type: Home Health Therapies & Aides   Number of Visits Requested: 1     Reason for your hospital stay   Order Comments: You were admitted for rehabilitation following a stroke.     Activity   Order Comments: Your activity upon discharge: activity as tolerated and as directed by therapies.  We recommend ongoing use of walker for mobility.  We have referred you to home care to continue to progress your function and independence.     Order Specific Question Answer Comments   Is discharge order? Yes      ADULT [facility] Specialty Follow-up and recommended labs and tests   Order Comments: Additional follow-up to include:  1) neurology with stroke RAY in 6-8 weeks  2) hematology  3) PM&R in 8-12 weeks  4) ophthalmology (scheduled 8/11)    Appointments on [place] and/or [facility] (with [facility] or [facility] provider or service). Call [phone] if you haven't heard regarding these appointments within 7 days of discharge.     Diet   Order Comments: Follow this diet upon discharge: Diabetes Diet; Thin Liquids     Order Specific Question Answer Comments   Is discharge order? Yes      Hospital Follow-up with Existing Primary Care Provider (PCP)   Standing Status: Future Standing Exp. Date: 08/29/25   Order Comments:       Order Specific Question Answer Comments   Schedule Primary Care visit within 7 Days    Recommended labs and Imaging (to be ordered by Primary Care Provider) BMP           PHYSICAL EXAMINATION    Most recent Vital Signs:   Vitals:    07/30/25 1758 07/30/25  2007 07/31/25 0700 07/31/25 0755   BP: (!) 156/59 (!) 145/49 (!) 119/38 135/57   BP Location: Left arm Left arm Left arm Left arm   Patient Position: Sitting Sitting     Cuff Size: Adult Large Adult Large     Pulse: 72 77 69    Resp:   16    Temp:   97.7  F (36.5  C)    TempSrc:   Oral    SpO2: 96% 95% 96%    Weight:       Height:           Gen: NAD, seated upright at edge of bed  HEENT: NC/AT, MMM  Cardio: RRR, no murmurs  Pulm: non-labored on room air, lungs CTA bilaterally  Abd: soft, non-tender, non-distended, bowel sounds present  Extr: no edema or calf tenderness in bilateral lower extremities  Neuro/MSK: AAOx3, PERRL, EOMI, left facial droop, speech clear/fluent.  Strength 4+ to 5/5 throughout all 4 extremities, slightly weaker on RUE than LUE.  Mild dysmetria in LUE on finger/nose.  Sensation intact to light touch in all 4 extremities.    35 minutes spent in discharge, including >50% in counseling and coordination of care, medication review and plan of care recommended on follow up.     Discharge summary was forwarded to Abelardo Pickard (PCP) at the time of discharge, so as to bridge from hospital to outpatient care.     It was our pleasure to care for Judi Moore during this hospitalization. Please do not hesitate to contact me should there be questions regarding the hospital course or discharge plan.          Plan of care was discussed with Dr. Aubrey Ro, PM&R staff physician and Dr. Mauricio, hospitalist physician    Carol Hayes PA-C  Physical Medicine & Rehabilitation

## 2025-07-31 NOTE — PLAN OF CARE
Goal Outcome Evaluation:      Plan of Care Reviewed With: patient    Overall Patient Progress: improvingOverall Patient Progress: improving       Patient slept most of the night, Mod I w/ FWW in the room, Continent of B&B, ambulates to the bathroom, still call for assistance at night, BG checked as ordered, no raised concern overnight.  Safety checks done  Plan for Discharge today.

## 2025-07-31 NOTE — PLAN OF CARE
Goal Outcome Evaluation:           Overall Patient Progress: improvingOverall Patient Progress: improving    Denies pain the whole shift. BG checked = 154 and 149. Insulin given per ordered parameters. Ate 75% for dinner independently. Doing well with MOD I using walker in room. Continent of bladder, used the toilet. LBM: 7/30 per pt report. Able to make her needs known, used call light appropriately as needed. Looking forward to discharging tomorrow. Bed alarms off.

## 2025-07-31 NOTE — PROGRESS NOTES
ARU Social Work Progress Notes           [facility], Acute Inpatient Rehab Unit   [facility] [address], [address] [place] [address]  Phone: [phone]  Fax: [phone]

## 2025-07-31 NOTE — TELEPHONE ENCOUNTER
RECORDS STATUS - ALL OTHER DIAGNOSIS      RECORDS RECEIVED FROM: [facility]   NOTES STATUS DETAILS   OFFICE NOTE from referring provider Epic Dr. George Stewart   MEDICATION LIST [facility]    LABS     PATHOLOGY REPORTS     ANYTHING RELATED TO DIAGNOSIS Epic

## 2025-07-31 NOTE — PLAN OF CARE
Goal Outcome Evaluation:  Occupational Therapy Discharge Summary    Reason for therapy discharge:    Discharged to home with home therapy.    Progress towards therapy goal(s). See goals on Care Plan in [facility] electronic health record for goal details.  Goals partially met.  Barriers to achieving goals:   discharge from facility.    Therapy recommendation(s):    Continued therapy is recommended.  Rationale/Recommendations:  continue to improve strength and endurance. And to assess safety within the home and increase independence with IADLS.                   Discharge Planner Post-Acute Rehab OT:      Discharge Plan: Mod I ADL at home with spouse, HC     Precautions: fall, HTN (SBP <180)     Current Status:  ADLs:  Mobility: Mod I FWW and shoes/ socks  Grooming: Mod I FWW  Dressing: Mod I FWW; sit in chair. IND with slip on shoes; A for socks.  Bathing: Tub transfer CGA <> ETB with grab bar/FWW to simulate home. S/U for bathing tasks.  Toileting: Mod I FWW and grab bar; items in reach.

## 2025-07-31 NOTE — PLAN OF CARE
Goal Outcome Evaluation:      Plan of Care Reviewed With: patient      Pt is A&Ox4. No respiratory distress noted at this shift and denies chest pain or SOB. Stable in room air. Eating and drinking ok. Voiding spontaneously with no difficulties per Pt. Tolerated all medications whole orally. Denies pain and appears comfortable sitting at the edge of bed.  at bedside. No acute event occurred at this shift and Pt is able to make needs known.    Pt. discharged at 1328 to home.  Pt. was accompanied by , and left with personal belongings. Pt. received complete discharge paperwork and all medications as filled by discharge pharmacy.  Pt. was given times of last dose for all discharge medications in writing on discharge medication sheets.  Writer went over with all the incoming appointments and follow ups as shown in the discharge paperwork. Pt. had no further questions at the time of discharge and no unmet needs were identified.

## 2025-11-06 ENCOUNTER — PRE VISIT (OUTPATIENT)
Dept: TRANSPLANT | Facility: CLINIC | Age: 70
End: 2025-11-06
Payer: COMMERCIAL

## (undated) DEVICE — BONE CEMENT MIXEVAC III HI VAC KIT  0206-015-000

## (undated) DEVICE — DRSG AQUACEL AG 3.5X9.75" HYDROFIBER 412011

## (undated) DEVICE — IMM KNEE 20" 0814-2660

## (undated) DEVICE — DECANTER BAG 2002S

## (undated) DEVICE — SU VICRYL 0 CT-1 CR 8X18" J740D

## (undated) DEVICE — MANIFOLD NEPTUNE 4 PORT 700-20

## (undated) DEVICE — GLOVE PROTEXIS W/NEU-THERA 7.5  2D73TE75

## (undated) DEVICE — BONE CLEANING TIP INTERPULSE  0210-010-000

## (undated) DEVICE — SOL WATER IRRIG 1000ML BOTTLE 2F7114

## (undated) DEVICE — DRSG AQUACEL AG 3.5X13.75" HYDROFIBER 412012

## (undated) DEVICE — NDL SPINAL 18GA 3.5" 405184

## (undated) DEVICE — DRAPE SHEET REV FOLD 3/4 9349

## (undated) DEVICE — BLADE SAW SAGITTAL STRK MED WIDE 25X73X0.89MM 2108-105-000

## (undated) DEVICE — GLOVE PROTEXIS POWDER FREE 6.5 ORTHOPEDIC 2D73ET65

## (undated) DEVICE — SOLUTION WOUND CLEANSING 3/4OZ 10% PVP EA-L3011FB-50

## (undated) DEVICE — HOOD FLYTE W/PEELAWAY 408-800-100

## (undated) DEVICE — GLOVE PROTEXIS POWDER FREE 6.0 ORTHOPEDIC 2D73ET60

## (undated) DEVICE — BLADE SAW SAGITTAL STRK 18X90X1.27MM HD SYS 6 6118-127-090

## (undated) DEVICE — GLOVE PROTEXIS BLUE W/NEU-THERA 6.5  2D73EB65

## (undated) DEVICE — GLOVE PROTEXIS W/NEU-THERA 8.0  2D73TE80

## (undated) DEVICE — PACK TOTAL KNEE SOP15TKFSD

## (undated) DEVICE — BLADE CLIPPER 4412A

## (undated) DEVICE — GLOVE PROTEXIS BLUE W/NEU-THERA 7.0  2D73EB70

## (undated) DEVICE — SU VICRYL 2-0 CT-1 27" UND J259H

## (undated) DEVICE — ESU GROUND PAD UNIVERSAL W/O CORD

## (undated) DEVICE — SYR 50ML LL W/O NDL 309653

## (undated) DEVICE — LINEN TOWEL PACK X5 5464

## (undated) DEVICE — SOL NACL 0.9% IRRIG 1000ML BOTTLE 2F7124

## (undated) DEVICE — PREP CHLORAPREP 26ML TINTED ORANGE  260815

## (undated) DEVICE — BLADE SAW RECIP STRK 70X12.5X0.80MM 0277-096-277

## (undated) DEVICE — SOL NACL 0.9% IRRIG 3000ML BAG 2B7477

## (undated) DEVICE — WRAP EZY KNEE

## (undated) DEVICE — SUCTION IRR SYSTEM W/O TIP INTERPULSE HANDPIECE 0210-100-000

## (undated) RX ORDER — TRANEXAMIC ACID 650 MG/1
TABLET ORAL
Status: DISPENSED
Start: 2022-01-17

## (undated) RX ORDER — FENTANYL CITRATE 0.05 MG/ML
INJECTION, SOLUTION INTRAMUSCULAR; INTRAVENOUS
Status: DISPENSED
Start: 2022-01-17

## (undated) RX ORDER — PROPOFOL 10 MG/ML
INJECTION, EMULSION INTRAVENOUS
Status: DISPENSED
Start: 2022-01-17

## (undated) RX ORDER — CEFAZOLIN SODIUM 2 G/100ML
INJECTION, SOLUTION INTRAVENOUS
Status: DISPENSED
Start: 2022-01-17